# Patient Record
Sex: FEMALE | Race: BLACK OR AFRICAN AMERICAN | Employment: OTHER | URBAN - METROPOLITAN AREA
[De-identification: names, ages, dates, MRNs, and addresses within clinical notes are randomized per-mention and may not be internally consistent; named-entity substitution may affect disease eponyms.]

---

## 2021-11-13 ENCOUNTER — APPOINTMENT (OUTPATIENT)
Dept: CT IMAGING | Age: 86
DRG: 643 | End: 2021-11-13
Attending: EMERGENCY MEDICINE
Payer: MEDICARE

## 2021-11-13 ENCOUNTER — APPOINTMENT (OUTPATIENT)
Dept: ULTRASOUND IMAGING | Age: 86
DRG: 643 | End: 2021-11-13
Attending: HOSPITALIST
Payer: MEDICARE

## 2021-11-13 ENCOUNTER — HOSPITAL ENCOUNTER (INPATIENT)
Age: 86
LOS: 11 days | Discharge: ANOTHER HOSPITAL | DRG: 643 | End: 2021-11-24
Attending: EMERGENCY MEDICINE | Admitting: HOSPITALIST
Payer: MEDICARE

## 2021-11-13 ENCOUNTER — APPOINTMENT (OUTPATIENT)
Dept: GENERAL RADIOLOGY | Age: 86
DRG: 643 | End: 2021-11-13
Attending: EMERGENCY MEDICINE
Payer: MEDICARE

## 2021-11-13 DIAGNOSIS — R91.8 LUNG MASS: ICD-10-CM

## 2021-11-13 DIAGNOSIS — E83.52 HYPERCALCEMIA: Primary | ICD-10-CM

## 2021-11-13 LAB
25(OH)D3 SERPL-MCNC: 32.7 NG/ML (ref 30–100)
ALBUMIN SERPL-MCNC: 3.6 G/DL (ref 3.5–5)
ALBUMIN/GLOB SERPL: 0.8 {RATIO} (ref 1.1–2.2)
ALP SERPL-CCNC: 58 U/L (ref 45–117)
ALT SERPL-CCNC: 20 U/L (ref 12–78)
ANION GAP SERPL CALC-SCNC: 7 MMOL/L (ref 5–15)
AST SERPL-CCNC: 31 U/L (ref 15–37)
BASOPHILS # BLD: 0 K/UL (ref 0–0.1)
BASOPHILS NFR BLD: 1 % (ref 0–1)
BILIRUB SERPL-MCNC: 0.6 MG/DL (ref 0.2–1)
BUN SERPL-MCNC: 66 MG/DL (ref 6–20)
BUN/CREAT SERPL: 29 (ref 12–20)
CALCIUM SERPL-MCNC: 16.6 MG/DL (ref 8.5–10.1)
CALCIUM SERPL-MCNC: 16.6 MG/DL (ref 8.5–10.1)
CHLORIDE SERPL-SCNC: 96 MMOL/L (ref 97–108)
CO2 SERPL-SCNC: 27 MMOL/L (ref 21–32)
COMMENT, HOLDF: NORMAL
CREAT SERPL-MCNC: 2.27 MG/DL (ref 0.55–1.02)
DIFFERENTIAL METHOD BLD: ABNORMAL
EOSINOPHIL # BLD: 0 K/UL (ref 0–0.4)
EOSINOPHIL NFR BLD: 1 % (ref 0–7)
ERYTHROCYTE [DISTWIDTH] IN BLOOD BY AUTOMATED COUNT: 11.8 % (ref 11.5–14.5)
GLOBULIN SER CALC-MCNC: 4.6 G/DL (ref 2–4)
GLUCOSE SERPL-MCNC: 119 MG/DL (ref 65–100)
HCT VFR BLD AUTO: 38.4 % (ref 35–47)
HGB BLD-MCNC: 12.4 G/DL (ref 11.5–16)
IMM GRANULOCYTES # BLD AUTO: 0 K/UL (ref 0–0.04)
IMM GRANULOCYTES NFR BLD AUTO: 0 % (ref 0–0.5)
LYMPHOCYTES # BLD: 1.3 K/UL (ref 0.8–3.5)
LYMPHOCYTES NFR BLD: 15 % (ref 12–49)
MCH RBC QN AUTO: 28.7 PG (ref 26–34)
MCHC RBC AUTO-ENTMCNC: 32.3 G/DL (ref 30–36.5)
MCV RBC AUTO: 88.9 FL (ref 80–99)
MONOCYTES # BLD: 1.1 K/UL (ref 0–1)
MONOCYTES NFR BLD: 13 % (ref 5–13)
NEUTS SEG # BLD: 5.9 K/UL (ref 1.8–8)
NEUTS SEG NFR BLD: 70 % (ref 32–75)
NRBC # BLD: 0 K/UL (ref 0–0.01)
NRBC BLD-RTO: 0 PER 100 WBC
PLATELET # BLD AUTO: 211 K/UL (ref 150–400)
PMV BLD AUTO: 11.8 FL (ref 8.9–12.9)
POTASSIUM SERPL-SCNC: 4 MMOL/L (ref 3.5–5.1)
PROT SERPL-MCNC: 8.2 G/DL (ref 6.4–8.2)
PTH-INTACT SERPL-MCNC: 1720.9 PG/ML (ref 18.4–88)
RBC # BLD AUTO: 4.32 M/UL (ref 3.8–5.2)
SAMPLES BEING HELD,HOLD: NORMAL
SODIUM SERPL-SCNC: 130 MMOL/L (ref 136–145)
WBC # BLD AUTO: 8.3 K/UL (ref 3.6–11)

## 2021-11-13 PROCEDURE — 80053 COMPREHEN METABOLIC PANEL: CPT

## 2021-11-13 PROCEDURE — 85025 COMPLETE CBC W/AUTO DIFF WBC: CPT

## 2021-11-13 PROCEDURE — 74011250636 HC RX REV CODE- 250/636: Performed by: HOSPITALIST

## 2021-11-13 PROCEDURE — 82784 ASSAY IGA/IGD/IGG/IGM EACH: CPT

## 2021-11-13 PROCEDURE — 76775 US EXAM ABDO BACK WALL LIM: CPT

## 2021-11-13 PROCEDURE — 83970 ASSAY OF PARATHORMONE: CPT

## 2021-11-13 PROCEDURE — 71250 CT THORAX DX C-: CPT

## 2021-11-13 PROCEDURE — 74011250636 HC RX REV CODE- 250/636: Performed by: EMERGENCY MEDICINE

## 2021-11-13 PROCEDURE — 36415 COLL VENOUS BLD VENIPUNCTURE: CPT

## 2021-11-13 PROCEDURE — 84165 PROTEIN E-PHORESIS SERUM: CPT

## 2021-11-13 PROCEDURE — 82306 VITAMIN D 25 HYDROXY: CPT

## 2021-11-13 PROCEDURE — 65660000001 HC RM ICU INTERMED STEPDOWN

## 2021-11-13 PROCEDURE — 99285 EMERGENCY DEPT VISIT HI MDM: CPT

## 2021-11-13 PROCEDURE — 76770 US EXAM ABDO BACK WALL COMP: CPT

## 2021-11-13 PROCEDURE — 71045 X-RAY EXAM CHEST 1 VIEW: CPT

## 2021-11-13 PROCEDURE — 82397 CHEMILUMINESCENT ASSAY: CPT

## 2021-11-13 PROCEDURE — 93005 ELECTROCARDIOGRAM TRACING: CPT

## 2021-11-13 RX ORDER — CHLORTHALIDONE 25 MG/1
25 TABLET ORAL DAILY
COMMUNITY

## 2021-11-13 RX ORDER — CINACALCET 30 MG/1
30 TABLET, FILM COATED ORAL
Status: DISCONTINUED | OUTPATIENT
Start: 2021-11-13 | End: 2021-11-15

## 2021-11-13 RX ORDER — SODIUM CHLORIDE 9 MG/ML
125 INJECTION, SOLUTION INTRAVENOUS CONTINUOUS
Status: DISCONTINUED | OUTPATIENT
Start: 2021-11-13 | End: 2021-11-20

## 2021-11-13 RX ORDER — AMLODIPINE BESYLATE 5 MG/1
5 TABLET ORAL DAILY
COMMUNITY

## 2021-11-13 RX ORDER — ROSUVASTATIN CALCIUM 10 MG/1
10 TABLET, COATED ORAL
COMMUNITY

## 2021-11-13 RX ADMIN — SODIUM CHLORIDE 1000 ML: 9 INJECTION, SOLUTION INTRAVENOUS at 13:49

## 2021-11-13 RX ADMIN — SODIUM CHLORIDE 125 ML/HR: 900 INJECTION, SOLUTION INTRAVENOUS at 16:00

## 2021-11-13 NOTE — ED TRIAGE NOTES
Pt states she was sent to ED by her doctor for having high calcium. States she was feeling weak yesterday.  Is unsure why labs were drawn

## 2021-11-13 NOTE — ED PROVIDER NOTES
This is an 28-year-old female who presents with fatigue, weakness, constipation and some transient confusion. She was seen yesterday by her primary physician is Dr. Sana Ardon from [facility]. She had some blood drawn for routine and Dr. Sana Ardon called the daughter today and told her that the calcium was extremely high and she needed to come to the hospital.  She has had no problems with chest pain, shortness of breath, nausea or vomiting and no other GI or  symptoms noted. Patient is alert and oriented currently with no acute complaints. No history of cancer. Vital signs are as noted. No past medical history on file. No past surgical history on file. No family history on file. Social History     Socioeconomic History    Marital status: Not on file     Spouse name: Not on file    Number of children: Not on file    Years of education: Not on file    Highest education level: Not on file   Occupational History    Not on file   Tobacco Use    Smoking status: Not on file    Smokeless tobacco: Not on file   Substance and Sexual Activity    Alcohol use: Not on file    Drug use: Not on file    Sexual activity: Not on file   Other Topics Concern    Not on file   Social History Narrative    Not on file     Social Determinants of Health     Financial Resource Strain:     Difficulty of Paying Living Expenses: Not on file   Food Insecurity:     Worried About Running Out of Food in the Last Year: Not on file    [place] of Food in the Last Year: Not on file   Transportation Needs:     Lack of Transportation (Medical): Not on file    Lack of Transportation (Non-Medical):  Not on file   Physical Activity:     Days of Exercise per Week: Not on file    Minutes of Exercise per Session: Not on file   Stress:     Feeling of Stress : Not on file   Social Connections:     Frequency of Communication with Friends and Family: Not on file    Frequency of Social Gatherings with Friends and Family: Not on file    Attends Jain Services: Not on file    Active Member of Clubs or Organizations: Not on file    Attends Club or Organization Meetings: Not on file    Marital Status: Not on file   Intimate Partner Violence:     Fear of Current or Ex-Partner: Not on file    Emotionally Abused: Not on file    Physically Abused: Not on file    Sexually Abused: Not on file   Housing Stability:     Unable to Pay for Housing in the Last Year: Not on file    Number of [place] in the Last Year: Not on file    Unstable Housing in the Last Year: Not on file         ALLERGIES: Patient has no allergy information on record. Review of Systems   Constitutional: Negative for activity change, appetite change, chills, fatigue and fever. HENT: Negative for ear pain, facial swelling, sore throat and trouble swallowing. Eyes: Negative for pain, discharge and visual disturbance. Respiratory: Negative for chest tightness, shortness of breath and wheezing. Cardiovascular: Negative for chest pain and palpitations. Gastrointestinal: Positive for constipation and nausea. Negative for abdominal pain, blood in stool and vomiting. Genitourinary: Negative for difficulty urinating, flank pain and hematuria. Musculoskeletal: Negative for arthralgias, joint swelling, myalgias and neck pain. Skin: Negative for color change and rash. Neurological: Positive for weakness. Negative for dizziness, numbness and headaches. Hematological: Negative for adenopathy. Does not bruise/bleed easily. Psychiatric/Behavioral: Negative for behavioral problems, confusion and sleep disturbance. All other systems reviewed and are negative. Vitals:    11/13/21 1215 11/13/21 1332   BP: 117/84    Pulse: 70    Resp: 18    Temp: 97.6 °F (36.4 °C)    SpO2: 97% 98%            Physical Exam  Vitals and nursing note reviewed. Constitutional:       General: She is not in acute distress.      Appearance: She is well-developed. She is not ill-appearing or diaphoretic. HENT:      Head: Normocephalic and atraumatic. Nose: Nose normal.   Eyes:      General: No scleral icterus. Conjunctiva/sclera: Conjunctivae normal.      Pupils: Pupils are equal, round, and reactive to light. Neck:      Thyroid: No thyromegaly. Vascular: No JVD. Trachea: No tracheal deviation. Comments: No carotid bruits noted. Cardiovascular:      Rate and Rhythm: Normal rate and regular rhythm. Heart sounds: Normal heart sounds. No murmur heard. No friction rub. No gallop. Pulmonary:      Effort: Pulmonary effort is normal. No respiratory distress. Breath sounds: Normal breath sounds. No wheezing or rales. Chest:      Chest wall: No tenderness. Abdominal:      General: Bowel sounds are normal. There is no distension. Palpations: Abdomen is soft. There is no mass. Tenderness: There is no abdominal tenderness. There is no guarding or rebound. Musculoskeletal:         General: No tenderness. Normal range of motion. Cervical back: Normal range of motion and neck supple. Lymphadenopathy:      Cervical: No cervical adenopathy. Skin:     General: Skin is warm and dry. Capillary Refill: Capillary refill takes 2 to 3 seconds. Findings: No erythema or rash. Neurological:      General: No focal deficit present. Mental Status: She is alert and oriented to person, place, and time. Mental status is at baseline. Cranial Nerves: No cranial nerve deficit. Coordination: Coordination normal.      Deep Tendon Reflexes: Reflexes are normal and symmetric. Psychiatric:         Behavior: Behavior normal.         Thought Content:  Thought content normal.         Judgment: Judgment normal.          MDM  Number of Diagnoses or Management Options  Hypercalcemia: new and requires workup  Lung mass: new and requires workup     Amount and/or Complexity of Data Reviewed  Clinical lab tests: ordered and reviewed  Tests in the radiology section of CPT®: ordered and reviewed  Decide to obtain previous medical records or to obtain history from someone other than the patient: yes  Review and summarize past medical records: yes  Discuss the patient with other providers: yes    Risk of Complications, Morbidity, and/or Mortality  Presenting problems: high  Diagnostic procedures: high  Management options: high    Patient Progress  Patient progress: stable         Procedures    This is an 42-year-old female with history of elevated calcium. We are in the process of verifying that lab and giving IV fluids currently. Her EKG appears to be normal along with changes of hypercalcemia. ED MD EKG interpretation, normal sinus rhythm at 67 beats a minute. Left axis shift is noted. No ectopy. There are some ST changes in V2 and V3 that are consistent with hypercalcemia. Patient is having no chest pain with the symptoms. 1:49 PM  Brittany Smart MD      Perfect Serve Consult for Admission  3:15 PM    ED Room Number: ER20/20  Patient Name and age:  Rubens Abdi 81 y.o.  female  Working Diagnosis:   1. Hypercalcemia    2.  Lung mass        COVID-19 Suspicion:  no  Sepsis present:  no  Reassessment needed: no  Code Status:  Full Code  Readmission: no  Isolation Requirements:  no  Recommended Level of Care:  telemetry  Department:[facility] ED - 21   Other:  Patient getting CT chest for lung mass

## 2021-11-13 NOTE — H&P
History & Physical    Primary Care Provider: Sean Moreno MD  Source of Information: Patient     History of Presenting Illness:   Terrance Solis is a 81 y.o. female who presents with fatigue constipation and abnormal lab test     This is an 81-year-old female who presents with fatigue, weakness, constipation and some transient confusion. She was seen yesterday by her primary physician is Dr. Aashish Cruz from [facility]. She had some blood drawn for routine and Dr. Aashish Cruz called the daughter today and told her that the calcium was extremely high and she needed to come to the hospital.  She has had no problems with chest pain, shortness of breath, nausea or vomiting and no other GI or  symptoms noted. Patient is alert and oriented currently with no acute complaints. Per pt, noticed lost weight last several years \" previous pants and clothes all too large for her now\" . Denied dysphagia. Denied voice changes. No history of cancer. .       Review of Systems:  General: HPI. HEENT: no headache, no vision changes, no nose discharge, no hearing changes   RES: no wheezing, no cough, no sob  CVS: no cp, no palpitation. Muscular: no joint swelling, no muscle pain, no leg swelling  Skin: no rash, no itching   GI: no vomiting, no diarrhea, no appetite, low po intake   : no dysuria, no hematuria  Hemo: no gum bleeding, no petechial   Neuro: no sensation changes, no focal weakness   Endo: no polydipsia   Psych: denied depression     No past medical history on file. No past surgical history on file. Prior to Admission medications    Medication Sig Start Date End Date Taking? Authorizing Provider   chlorthalidone (HYGROTON) 25 mg tablet Take 25 mg by mouth daily. Yes Provider, Historical   amLODIPine (Norvasc) 5 mg tablet Take 5 mg by mouth daily. Yes Provider, Historical   rosuvastatin (CRESTOR) 10 mg tablet Take 10 mg by mouth nightly.    Yes Provider, Historical     No Known Allergies   No family history on file. SOCIAL HISTORY:  Patient resides:  Independently x   Assisted Living    SNF    With family care       Smoking history:   None x   Former    Chronic      Alcohol history:   None x   Social    Chronic      Ambulates:   Independently x   w/cane    w/walker    w/wc    CODE STATUS:  DNR    Full x   Other      Objective:     Physical Exam:     Visit Vitals  BP (!) 143/57   Pulse 70   Temp 97.6 °F (36.4 °C)   Resp 19   SpO2 100%           General:  Alert, cooperative, no distress, appears stated age. Head:  Normocephalic, without obvious abnormality, atraumatic. Eyes:  Conjunctivae/corneas clear. PERRL, EOMs intact. Nose: Nares normal. Septum midline. Mucosa normal. No drainage or sinus tenderness. Throat: Lips, mucosa, and tongue normal. Teeth and gums normal.   Neck: Supple, symmetrical, trachea midline, no adenopathy, thyroid: no enlargement/tenderness/nodules, no carotid bruit and no JVD. Back:   Symmetric, no curvature. ROM normal. No CVA tenderness. Lungs:   Clear to auscultation bilaterally. Chest wall:  No tenderness or deformity. Heart:  Regular rate and rhythm, S1, S2 normal, no murmur, click, rub or gallop. Abdomen:   Soft, non-tender. Bowel sounds normal. No masses,  No organomegaly. Extremities: Extremities normal, atraumatic, no cyanosis or edema. Pulses: 2+ and symmetric all extremities. Skin: Skin color, texture, turgor normal. No rashes or lesions   Neurologic: CNII-XII intact. None focal                Data Review:     Recent Days:  Recent Labs     11/13/21  1339   WBC 8.3   HGB 12.4   HCT 38.4        Recent Labs     11/13/21  1340 11/13/21  1339   *  --    K 4.0  --    CL 96*  --    CO2 27  --    *  --    BUN 66*  --    CREA 2.27*  --    CA 16.6* 16.6*   ALB 3.6  --    ALT 20  --      No results for input(s): PH, PCO2, PO2, HCO3, FIO2 in the last 72 hours.     24 Hour Results:  Recent Results (from the past 24 hour(s))   EKG, 12 LEAD, INITIAL    Collection Time: 11/13/21  1:23 PM   Result Value Ref Range    Ventricular Rate 67 BPM    Atrial Rate 67 BPM    P-R Interval 176 ms    QRS Duration 98 ms    Q-T Interval 426 ms    QTC Calculation (Bezet) 450 ms    Calculated P Axis 42 degrees    Calculated R Axis -12 degrees    Calculated T Axis 55 degrees    Diagnosis         Normal sinus rhythm  Moderate voltage criteria for LVH, may be normal variant ( R in aVL , [employer]am[employer]   product )  Anteroseptal infarct , possibly acute    Abnormal ECG  No previous ECGs available     CBC WITH AUTOMATED DIFF    Collection Time: 11/13/21  1:39 PM   Result Value Ref Range    WBC 8.3 3.6 - 11.0 K/uL    RBC 4.32 3.80 - 5.20 M/uL    HGB 12.4 11.5 - 16.0 g/dL    HCT 38.4 35.0 - 47.0 %    MCV 88.9 80.0 - 99.0 FL    MCH 28.7 26.0 - 34.0 PG    MCHC 32.3 30.0 - 36.5 g/dL    RDW 11.8 11.5 - 14.5 %    PLATELET 454 146 - 208 K/uL    MPV 11.8 8.9 - 12.9 FL    NRBC 0.0 0  WBC    ABSOLUTE NRBC 0.00 0.00 - 0.01 K/uL    NEUTROPHILS 70 32 - 75 %    LYMPHOCYTES 15 12 - 49 %    MONOCYTES 13 5 - 13 %    EOSINOPHILS 1 0 - 7 %    BASOPHILS 1 0 - 1 %    IMMATURE GRANULOCYTES 0 0.0 - 0.5 %    ABS. NEUTROPHILS 5.9 1.8 - 8.0 K/UL    ABS. LYMPHOCYTES 1.3 0.8 - 3.5 K/UL    ABS. MONOCYTES 1.1 (H) 0.0 - 1.0 K/UL    ABS. EOSINOPHILS 0.0 0.0 - 0.4 K/UL    ABS. BASOPHILS 0.0 0.0 - 0.1 K/UL    ABS. IMM. GRANS. 0.0 0.00 - 0.04 K/UL    DF AUTOMATED     SAMPLES BEING HELD    Collection Time: 11/13/21  1:39 PM   Result Value Ref Range    SAMPLES BEING HELD 1BLU     COMMENT        Add-on orders for these samples will be processed based on acceptable specimen integrity and analyte stability, which may vary by analyte.    PTH INTACT    Collection Time: 11/13/21  1:39 PM   Result Value Ref Range    Calcium 16.6 (HH) 8.5 - 10.1 MG/DL    PTH, Intact 1,720.9 (H) 18.4 - 74.1 pg/mL   METABOLIC PANEL, COMPREHENSIVE    Collection Time: 11/13/21  1:40 PM   Result Value Ref Range    Sodium 130 (L) 136 - 145 mmol/L    Potassium 4.0 3.5 - 5.1 mmol/L    Chloride 96 (L) 97 - 108 mmol/L    CO2 27 21 - 32 mmol/L    Anion gap 7 5 - 15 mmol/L    Glucose 119 (H) 65 - 100 mg/dL    BUN 66 (H) 6 - 20 MG/DL    Creatinine 2.27 (H) 0.55 - 1.02 MG/DL    BUN/Creatinine ratio 29 (H) 12 - 20      GFR est AA 25 (L) >60 ml/min/1.73m2    GFR est non-AA 20 (L) >60 ml/min/1.73m2    Calcium 16.6 (HH) 8.5 - 10.1 MG/DL    Bilirubin, total 0.6 0.2 - 1.0 MG/DL    ALT (SGPT) 20 12 - 78 U/L    AST (SGOT) 31 15 - 37 U/L    Alk. phosphatase 58 45 - 117 U/L    Protein, total 8.2 6.4 - 8.2 g/dL    Albumin 3.6 3.5 - 5.0 g/dL    Globulin 4.6 (H) 2.0 - 4.0 g/dL    A-G Ratio 0.8 (L) 1.1 - 2.2           Imaging:   XR CHEST PORT    Result Date: 11/13/2021  Right paratracheal mass. Chest CT with IV contrast (venous phase, not PE protocol) is recommended for further evaluation. The findings were called to Dr. Crystal Bae on 11/13/2021 2:42 PM by Dr. Chance Cramer. 409       Assessment:     Active Problems:    Hypercalcemia (11/13/2021)      Lung mass (11/13/2021)           Plan:     1. Severe hypercalcemia: etiology unclear, pt is taking thiazide diuretics at home. aggressive IVF now.  will check I PTH, PTHrp, VIt d level. Tsh, and spep/if to r/o MM. Nephrologist consult. Plan discussed. 2. VÍCTOR likely: pt denied CKD history. May due to preneal. IVF, check renal us, nephrologist consult. 3. Right paratracheal mass: could be the malignancy that cause her hypercalcemia. Non contrast CT of chest to further eval mass, may need either IR or pulmonologist or ENT consult for further  Plan   4.  HTN: hold thiazide , restart norvasc        Signed By: Clarissa Mathis MD     November 13, 2021

## 2021-11-14 LAB
ANION GAP SERPL CALC-SCNC: 4 MMOL/L (ref 5–15)
ATRIAL RATE: 67 BPM
BUN SERPL-MCNC: 56 MG/DL (ref 6–20)
BUN/CREAT SERPL: 30 (ref 12–20)
CALCIUM SERPL-MCNC: 14.8 MG/DL (ref 8.5–10.1)
CALCULATED P AXIS, ECG09: 42 DEGREES
CALCULATED R AXIS, ECG10: -12 DEGREES
CALCULATED T AXIS, ECG11: 55 DEGREES
CHLORIDE SERPL-SCNC: 105 MMOL/L (ref 97–108)
CO2 SERPL-SCNC: 25 MMOL/L (ref 21–32)
CREAT SERPL-MCNC: 1.88 MG/DL (ref 0.55–1.02)
DIAGNOSIS, 93000: NORMAL
ERYTHROCYTE [DISTWIDTH] IN BLOOD BY AUTOMATED COUNT: 11.8 % (ref 11.5–14.5)
GLUCOSE SERPL-MCNC: 88 MG/DL (ref 65–100)
HCT VFR BLD AUTO: 31.5 % (ref 35–47)
HGB BLD-MCNC: 10.1 G/DL (ref 11.5–16)
MAGNESIUM SERPL-MCNC: 1.7 MG/DL (ref 1.6–2.4)
MCH RBC QN AUTO: 28.5 PG (ref 26–34)
MCHC RBC AUTO-ENTMCNC: 32.1 G/DL (ref 30–36.5)
MCV RBC AUTO: 89 FL (ref 80–99)
NRBC # BLD: 0 K/UL (ref 0–0.01)
NRBC BLD-RTO: 0 PER 100 WBC
P-R INTERVAL, ECG05: 176 MS
PHOSPHATE SERPL-MCNC: 2.8 MG/DL (ref 2.6–4.7)
PLATELET # BLD AUTO: 167 K/UL (ref 150–400)
PMV BLD AUTO: 11.8 FL (ref 8.9–12.9)
POTASSIUM SERPL-SCNC: 3.3 MMOL/L (ref 3.5–5.1)
Q-T INTERVAL, ECG07: 426 MS
QRS DURATION, ECG06: 98 MS
QTC CALCULATION (BEZET), ECG08: 450 MS
RBC # BLD AUTO: 3.54 M/UL (ref 3.8–5.2)
SODIUM SERPL-SCNC: 134 MMOL/L (ref 136–145)
TSH SERPL DL<=0.05 MIU/L-ACNC: 0.33 UIU/ML (ref 0.36–3.74)
VENTRICULAR RATE, ECG03: 67 BPM
WBC # BLD AUTO: 7.5 K/UL (ref 3.6–11)

## 2021-11-14 PROCEDURE — 77030038269 HC DRN EXT URIN PURWCK BARD -A

## 2021-11-14 PROCEDURE — 36415 COLL VENOUS BLD VENIPUNCTURE: CPT

## 2021-11-14 PROCEDURE — 83735 ASSAY OF MAGNESIUM: CPT

## 2021-11-14 PROCEDURE — 74011250637 HC RX REV CODE- 250/637: Performed by: HOSPITALIST

## 2021-11-14 PROCEDURE — 74011250636 HC RX REV CODE- 250/636: Performed by: HOSPITALIST

## 2021-11-14 PROCEDURE — 85027 COMPLETE CBC AUTOMATED: CPT

## 2021-11-14 PROCEDURE — 65660000001 HC RM ICU INTERMED STEPDOWN

## 2021-11-14 PROCEDURE — 84443 ASSAY THYROID STIM HORMONE: CPT

## 2021-11-14 PROCEDURE — 80048 BASIC METABOLIC PNL TOTAL CA: CPT

## 2021-11-14 PROCEDURE — 74011250637 HC RX REV CODE- 250/637: Performed by: INTERNAL MEDICINE

## 2021-11-14 PROCEDURE — 84100 ASSAY OF PHOSPHORUS: CPT

## 2021-11-14 RX ORDER — AMLODIPINE BESYLATE 5 MG/1
5 TABLET ORAL DAILY
Status: DISCONTINUED | OUTPATIENT
Start: 2021-11-14 | End: 2021-11-17

## 2021-11-14 RX ORDER — HEPARIN SODIUM 5000 [USP'U]/ML
5000 INJECTION, SOLUTION INTRAVENOUS; SUBCUTANEOUS EVERY 12 HOURS
Status: DISCONTINUED | OUTPATIENT
Start: 2021-11-14 | End: 2021-11-25 | Stop reason: HOSPADM

## 2021-11-14 RX ORDER — ONDANSETRON 2 MG/ML
4 INJECTION INTRAMUSCULAR; INTRAVENOUS
Status: DISCONTINUED | OUTPATIENT
Start: 2021-11-14 | End: 2021-11-25 | Stop reason: HOSPADM

## 2021-11-14 RX ORDER — POTASSIUM CHLORIDE 750 MG/1
20 TABLET, FILM COATED, EXTENDED RELEASE ORAL 2 TIMES DAILY
Status: COMPLETED | OUTPATIENT
Start: 2021-11-14 | End: 2021-11-14

## 2021-11-14 RX ORDER — ROSUVASTATIN CALCIUM 10 MG/1
10 TABLET, COATED ORAL
Status: DISCONTINUED | OUTPATIENT
Start: 2021-11-14 | End: 2021-11-25 | Stop reason: HOSPADM

## 2021-11-14 RX ORDER — SODIUM CHLORIDE 0.9 % (FLUSH) 0.9 %
5-40 SYRINGE (ML) INJECTION AS NEEDED
Status: DISCONTINUED | OUTPATIENT
Start: 2021-11-14 | End: 2021-11-25 | Stop reason: HOSPADM

## 2021-11-14 RX ORDER — ACETAMINOPHEN 325 MG/1
650 TABLET ORAL
Status: DISCONTINUED | OUTPATIENT
Start: 2021-11-14 | End: 2021-11-25 | Stop reason: HOSPADM

## 2021-11-14 RX ORDER — SODIUM CHLORIDE 0.9 % (FLUSH) 0.9 %
5-40 SYRINGE (ML) INJECTION EVERY 8 HOURS
Status: DISCONTINUED | OUTPATIENT
Start: 2021-11-14 | End: 2021-11-25 | Stop reason: HOSPADM

## 2021-11-14 RX ORDER — ACETAMINOPHEN 650 MG/1
650 SUPPOSITORY RECTAL
Status: DISCONTINUED | OUTPATIENT
Start: 2021-11-14 | End: 2021-11-25 | Stop reason: HOSPADM

## 2021-11-14 RX ORDER — ONDANSETRON 4 MG/1
4 TABLET, ORALLY DISINTEGRATING ORAL
Status: DISCONTINUED | OUTPATIENT
Start: 2021-11-14 | End: 2021-11-25 | Stop reason: HOSPADM

## 2021-11-14 RX ORDER — POLYETHYLENE GLYCOL 3350 17 G/17G
17 POWDER, FOR SOLUTION ORAL DAILY PRN
Status: DISCONTINUED | OUTPATIENT
Start: 2021-11-14 | End: 2021-11-25 | Stop reason: HOSPADM

## 2021-11-14 RX ADMIN — SODIUM CHLORIDE 125 ML/HR: 900 INJECTION, SOLUTION INTRAVENOUS at 00:15

## 2021-11-14 RX ADMIN — SODIUM CHLORIDE 125 ML/HR: 900 INJECTION, SOLUTION INTRAVENOUS at 19:26

## 2021-11-14 RX ADMIN — CINACALCET HYDROCHLORIDE 30 MG: 30 TABLET, FILM COATED ORAL at 10:09

## 2021-11-14 RX ADMIN — ROSUVASTATIN 10 MG: 10 TABLET, FILM COATED ORAL at 22:57

## 2021-11-14 RX ADMIN — Medication 10 ML: at 22:57

## 2021-11-14 RX ADMIN — POTASSIUM CHLORIDE 20 MEQ: 750 TABLET, FILM COATED, EXTENDED RELEASE ORAL at 19:20

## 2021-11-14 RX ADMIN — POTASSIUM CHLORIDE 20 MEQ: 750 TABLET, FILM COATED, EXTENDED RELEASE ORAL at 10:10

## 2021-11-14 RX ADMIN — HEPARIN SODIUM 5000 UNITS: 5000 INJECTION INTRAVENOUS; SUBCUTANEOUS at 16:08

## 2021-11-14 RX ADMIN — AMLODIPINE BESYLATE 5 MG: 5 TABLET ORAL at 10:10

## 2021-11-14 RX ADMIN — Medication 10 ML: at 16:13

## 2021-11-14 NOTE — CONSULTS
OTOLARYNGOLOGY - HEAD AND NECK SURGERY HISTORY AND PHYSICAL    CC:   hypercalcemia    HPI:     Kristina Moreno is a 81 y.o. female seen today as a new inpatient consult for hypercalcemia. This is an 81-year-old female who presents with fatigue, weakness, constipation and some transient confusion. She was seen Friday by her primary physician is Dr. Madhavi Vann from [facility]. She had some blood drawn for routine and Dr. Madhavi Vann called the daughter Saturday and told her that the calcium was extremely high and she needed to come to the hospital.  She has had no problems with chest pain, shortness of breath, nausea or vomiting and no other GI or  symptoms noted. Patient is alert and oriented currently with no acute complaints. No history of cancer. Vital signs are as noted. PMH: HTN, hyperlipidemia  No past surgical history on file. Current Facility-Administered Medications   Medication Dose Route Frequency    amLODIPine (NORVASC) tablet 5 mg  5 mg Oral DAILY    potassium chloride SR (KLOR-CON 10) tablet 20 mEq  20 mEq Oral BID    0.9% sodium chloride infusion  125 mL/hr IntraVENous CONTINUOUS    cinacalcet (SENSIPAR) tablet 30 mg  30 mg Oral DAILY WITH BREAKFAST      No Known Allergies  No family history on file. Social History     Tobacco Use    Smoking status: Not on file    Smokeless tobacco: Not on file   Substance Use Topics    Alcohol use: Not on file    Drug use: Not on file         REVIEW OF SYSTEMS  A full 10 point review of systems was performed today. The review was non-pertinent other than the details already listed in the history of present illness. Visit Vitals  BP (!) 154/46 (BP 1 Location: Left upper arm, BP Patient Position: At rest;Semi fowlers)   Pulse 66   Temp 98.6 °F (37 °C)   Resp 20   Ht 5' 6\" (1.676 m)   Wt 53.3 kg (117 lb 8 oz)   SpO2 99%   BMI 18.96 kg/m²        PHYSICAL EXAM  General:  No acute distress. Alert and oriented x 3.    MSK:   Normal muscle bulk  Psych: Mood and affect appropriate. Neuro:  CN II - XII grossly intact bilaterally. Eyes:  PERRL/EOMI, no nystagmus. ENT:   EACs are patent, clean and dry. TMs clear and intact with normal anatomic landmarks. No middle ear fluid. Anterior rhinoscopy without mucopurulence or polyps. OC/OP clear without masses or lesions. Lymph:  Neck soft and supple without lymphadenopathy. Right neck fullness. Resp:   No audible stridor or wheezing. Skin:   Head and neck skin is without suspicious lesions. DATA REVIEW:    Lab Results   Component Value Date/Time    TSH 0.33 (L) 11/14/2021 04:14 AM      Lab Results   Component Value Date/Time    Calcium 14.8 (HH) 11/14/2021 04:14 AM    Phosphorus 2.8 11/14/2021 04:14 AM    PTH, Intact 1,720.9 (H) 11/13/2021 01:39 PM      Lab Results   Component Value Date/Time    Vitamin D 25-Hydroxy 32.7 11/13/2021 06:29 PM        PTH-rp- pending    Lab Results   Component Value Date/Time    Sodium 134 (L) 11/14/2021 04:14 AM    Potassium 3.3 (L) 11/14/2021 04:14 AM    Chloride 105 11/14/2021 04:14 AM    CO2 25 11/14/2021 04:14 AM    Anion gap 4 (L) 11/14/2021 04:14 AM    Glucose 88 11/14/2021 04:14 AM    BUN 56 (H) 11/14/2021 04:14 AM    Creatinine 1.88 (H) 11/14/2021 04:14 AM    BUN/Creatinine ratio 30 (H) 11/14/2021 04:14 AM    GFR est AA 31 (L) 11/14/2021 04:14 AM    GFR est non-AA 25 (L) 11/14/2021 04:14 AM    Calcium 14.8 (HH) 11/14/2021 04:14 AM      CT chest w/o contrast dated 11/13/21:  I personally reviewed the scan which has the following pertinent findings:  Large right sided goiter causing some mild compression of trachea. No airway erosion. ASSESSMENT/PLAN:  81 y.o. F presenting with hypercalcemia, severe hyperparathyroidism, and.  Nephrology following and started Sensipar together with aggressive IVF resuscitation. Parathyroid adenoma, 4 gland hyperplasia or even parathyroid carcinoma a possibility given extremely elevated PTH. PTH-rp pending.       Please get Sestamibi scan while in house as well as CT neck w/ contrast after creatinine has normalized. Also low TSH. I do not see a T4/T3. I recommend checking T3/T4 and consulting endocrinology tomorrow. Will continue to follow. Artist Gerda Dyer, [address] [facility] [address], [facility]   [address] [facility], [address]   (B) [phone]  (P) [phone]

## 2021-11-14 NOTE — PROGRESS NOTES
[address] [facility] Adult  Hospitalist Group                                                                                          Hospitalist Progress Note  Gustavo Devries MD  Answering service: [phone] or [phone] from in house phone        Date of Service:  2021  NAME:  Sloan Vilchis  :  1935  MRN:  [id number]      Admission Summary:   Sloan Vilchis is a 81 y.o. female who presents with fatigue constipation and abnormal lab test      This is an 44-year-old female who presents with fatigue, weakness, constipation and some transient confusion.  She was seen yesterday by her primary physician is Dr. Flaca Cavazos from [facility] had some blood drawn for routine and Dr. Flaca Cavazos called the daughter today and told her that the calcium was extremely high and she needed to come to the hospital. Crystal Pérez has had no problems with chest pain, shortness of breath, nausea or vomiting and no other GI or  symptoms noted.  Patient is alert and oriented currently with no acute complaints. Per pt, noticed lost weight last several years \" previous pants and clothes all too large for her now\" . Denied dysphagia.  Denied voice changes.  No history of cancer.  .       Interval history / Subjective:     F/u hypercalcemia   Slightly improved weakness  Assessment & Plan:     Severe hypercalcemia: etiology unclear  VÍCTOR  Primary hyperparathyroidism  - pt is taking thiazide diuretics at home, currently on hold  -Calcium improved from 16.6 to 14.8  -Creatinine improved from 2.27 to 1.88  -Increased intact PTH, follow PTHrP  -renal US unremarkable  -Continue IV Fluids  -s/p sensipar  -Appreciate Nephrology    Right paratracheal mass  -Appreciate ENT, sestamibi scan, CT neck with contrast once renal function improves    Hypokalemia: replace as needed  Hyponatremia: improving with fluids  Abnormal admission EKG, will repeat. echo  HTN: hold thiazide , restart norvasc       Renal diet    Code status: FULL CODE  DVT prophylaxis: Heparin    Plan: On sensipar, continue IV fluids    Care Plan discussed with: Patient/Family  Anticipated Disposition: TBD  Anticipated Discharge: Greater than 48 hours     Hospital Problems  Date Reviewed: 11/14/2021          Codes Class Noted POA    * (Principal) Hypercalcemia ICD-10-CM: K80.00  ICD-9-CM: 275.42  11/13/2021 Yes        Lung mass ICD-10-CM: R91.8  ICD-9-CM: 786.6  11/13/2021 Unknown                Review of Systems:   A comprehensive review of systems was negative except for that written in the HPI. Vital Signs:    Last 24hrs VS reviewed since prior progress note. Most recent are:  Visit Vitals  /89 (BP 1 Location: Left upper arm, BP Patient Position: At rest)   Pulse 70   Temp 98.5 °F (36.9 °C)   Resp 14   Ht 5' 6\" (1.676 m)   Wt 53.3 kg (117 lb 8 oz)   SpO2 100%   BMI 18.96 kg/m²         Intake/Output Summary (Last 24 hours) at 11/14/2021 1312  Last data filed at 11/13/2021 1601  Gross per 24 hour   Intake 1000 ml   Output    Net 1000 ml        Physical Examination:     I had a face to face encounter with this patient and independently examined them on 11/14/2021 as outlined below:          Constitutional:  No acute distress   ENT:  Oral mucosa moist, oropharynx benign. Resp:  CTA bilaterally. No wheezing/rhonchi/rales. No accessory muscle use   CV:  Regular rhythm, normal rate, no murmurs, gallops, rubs    GI:  Soft, non distended, non tender. normoactive bowel sounds, no hepatosplenomegaly     Musculoskeletal:  No edema, warm, 2+ pulses throughout    Neurologic:  Moves all extremities.   AAOx3, CN II-XII reviewed            Data Review:    Review and/or order of clinical lab test      Labs:     Recent Labs     11/14/21  0414 11/13/21  1339   WBC 7.5 8.3   HGB 10.1* 12.4   HCT 31.5* 38.4    211     Recent Labs     11/14/21  0414 11/13/21  1340 11/13/21  1339   * 130*  --    K 3.3* 4.0  --     96*  --    CO2 25 27  --    BUN 56* 66*  --    CREA 1.88* 2.27*  --    GLU 88 119*  --    CA 14.8* 16.6* 16.6*   MG 1.7  --   --    PHOS 2.8  --   --      Recent Labs     11/13/21  1340   ALT 20   AP 58   TBILI 0.6   TP 8.2   ALB 3.6   GLOB 4.6*     No results for input(s): INR, PTP, APTT, INREXT in the last 72 hours. No results for input(s): FE, TIBC, PSAT, FERR in the last 72 hours. No results found for: FOL, RBCF   No results for input(s): PH, PCO2, PO2 in the last 72 hours. No results for input(s): CPK, CKNDX, TROIQ in the last 72 hours.     No lab exists for component: CPKMB  No results found for: CHOL, CHOLX, CHLST, CHOLV, HDL, HDLP, LDL, LDLC, DLDLP, TGLX, TRIGL, TRIGP, CHHD, CHHDX  No results found for: GLUCPOC  No results found for: COLOR, APPRN, SPGRU, REFSG, KATY, PROTU, GLUCU, KETU, BILU, UROU, IRASEMA, LEUKU, GLUKE, EPSU, BACTU, WBCU, RBCU, CASTS, UCRY      Medications Reviewed:     Current Facility-Administered Medications   Medication Dose Route Frequency    amLODIPine (NORVASC) tablet 5 mg  5 mg Oral DAILY    potassium chloride SR (KLOR-CON 10) tablet 20 mEq  20 mEq Oral BID    0.9% sodium chloride infusion  125 mL/hr IntraVENous CONTINUOUS    cinacalcet (SENSIPAR) tablet 30 mg  30 mg Oral DAILY WITH BREAKFAST     ______________________________________________________________________  EXPECTED LENGTH OF STAY: - - -  ACTUAL LENGTH OF STAY:          1                 See Treviño MD

## 2021-11-14 NOTE — PROGRESS NOTES
NAME: River Gonzalez        :  1935        MRN:  [id number]                     Assessment   :                                               Plan:  VÍCTOR  Hyponatremia, mild  Severe hypercalcemia  Constipation  Anorexia  Right paratracheal mass/goiter  Primary hyperparathyroidism  HTN  Hypokalemia Creatinine 2.3 to 1.9; nml SHELLEY  Very high PTH (1720)  PTHrp, SPEP, BERNARDA, 25 VD pending  Na 130 to 134  Calcium 16.6 to 14.8  K 3.3 (give PO)  Restart home amlodipine     Continue IVF resuscitation, Giving Sensipar; I don't think she is a surgical candidate due to age         Subjective:     Chief Complaint:  Awake. Alert. Eating breakfast at the time of my visit. I spoke with she and her daughter about the above findings and plan. Review of Systems:    Symptom Y/N Comments  Symptom Y/N Comments   Fever/Chills    Chest Pain     Poor Appetite    Edema     Cough    Abdominal Pain     Sputum    Joint Pain     SOB/ARIZA    Pruritis/Rash     Nausea/vomit    Tolerating PT/OT     Diarrhea    Tolerating Diet     Constipation    Other       Could not obtain due to:      Objective:     VITALS:   Last 24hrs VS reviewed since prior progress note.  Most recent are:  Visit Vitals  BP (!) 159/57 (BP 1 Location: Right upper arm, BP Patient Position: At rest)   Pulse (!) 58   Temp 98.5 °F (36.9 °C)   Resp 18   Ht 5' 6\" (1.676 m)   Wt 53.3 kg (117 lb 8 oz)   SpO2 100%   BMI 18.96 kg/m²       Intake/Output Summary (Last 24 hours) at 2021 0802  Last data filed at 2021 1601  Gross per 24 hour   Intake 1000 ml   Output    Net 1000 ml      Telemetry Reviewed:     PHYSICAL EXAM:  General: NAD  No edema  cta arpan    Lab Data Reviewed: (see below)    Medications Reviewed: (see below)    PMH/SH reviewed - no change compared to H&P  ________________________________________________________________________  Care Plan discussed with:  Patient     Family      RN Care Manager                    Consultant:          Comments   >50% of visit spent in counseling and coordination of care       ________________________________________________________________________  Luella Angelucci, MD     Procedures: see electronic medical records for all procedures/Xrays and details which  were not copied into this note but were reviewed prior to creation of Plan. LABS:  Recent Labs     11/14/21  0414 11/13/21  1339   WBC 7.5 8.3   HGB 10.1* 12.4   HCT 31.5* 38.4    211     Recent Labs     11/14/21  0414 11/13/21  1340 11/13/21  1339   * 130*  --    K 3.3* 4.0  --     96*  --    CO2 25 27  --    BUN 56* 66*  --    CREA 1.88* 2.27*  --    GLU 88 119*  --    CA 14.8* 16.6* 16.6*   MG 1.7  --   --    PHOS 2.8  --   --      Recent Labs     11/13/21  1340   AP 58   TP 8.2   ALB 3.6   GLOB 4.6*     No results for input(s): INR, PTP, APTT, INREXT in the last 72 hours. No results for input(s): FE, TIBC, PSAT, FERR in the last 72 hours. No results found for: FOL, RBCF   No results for input(s): PH, PCO2, PO2 in the last 72 hours. No results for input(s): CPK, CKMB in the last 72 hours.     No lab exists for component: TROPONINI  No components found for: GLPOC  No results found for: COLOR, APPRN, SPGRU, REFSG, KATY, PROTU, GLUCU, KETU, BILU, UROU, IRASEMA, LEUKU, GLUKE, EPSU, BACTU, WBCU, RBCU, CASTS, UCRY    MEDICATIONS:  Current Facility-Administered Medications   Medication Dose Route Frequency    0.9% sodium chloride infusion  125 mL/hr IntraVENous CONTINUOUS    cinacalcet (SENSIPAR) tablet 30 mg  30 mg Oral DAILY WITH BREAKFAST

## 2021-11-14 NOTE — ED NOTES
TRANSFER - OUT REPORT:    Verbal report given to Kathleen Thompson RN(name) on Marii Leal  being transferred to  (unit) for routine progression of care       Report consisted of patients Situation, Background, Assessment and   Recommendations(SBAR). Information from the following report(s) SBAR was reviewed with the receiving nurse. Lines:   Peripheral IV 11/13/21 Right Forearm (Active)   Site Assessment Clean, dry, & intact 11/13/21 1338   Phlebitis Assessment 0 11/13/21 1338   Infiltration Assessment 0 11/13/21 1338   Dressing Status Clean, dry, & intact 11/13/21 1338   Dressing Type Transparent 11/13/21 1338   Hub Color/Line Status Pink; Flushed 11/13/21 1338        Opportunity for questions and clarification was provided.       Patient transported with:   Monitor  RN

## 2021-11-14 NOTE — PROGRESS NOTES
Bedside and Verbal shift change report given to Diamond (oncoming nurse) by Екатерина Mclaughlin (offgoing nurse). Report included the following information SBAR, Kardex, MAR, Accordion, Recent Results and Cardiac Rhythm sinus.

## 2021-11-15 ENCOUNTER — APPOINTMENT (OUTPATIENT)
Dept: NON INVASIVE DIAGNOSTICS | Age: 86
DRG: 643 | End: 2021-11-15
Attending: HOSPITALIST
Payer: MEDICARE

## 2021-11-15 LAB
ANION GAP SERPL CALC-SCNC: 5 MMOL/L (ref 5–15)
BUN SERPL-MCNC: 52 MG/DL (ref 6–20)
BUN/CREAT SERPL: 27 (ref 12–20)
CALCIUM SERPL-MCNC: 14.7 MG/DL (ref 8.5–10.1)
CHLORIDE SERPL-SCNC: 105 MMOL/L (ref 97–108)
CO2 SERPL-SCNC: 25 MMOL/L (ref 21–32)
COMMENT, HOLDF: NORMAL
CREAT SERPL-MCNC: 1.95 MG/DL (ref 0.55–1.02)
GLUCOSE SERPL-MCNC: 81 MG/DL (ref 65–100)
MAGNESIUM SERPL-MCNC: 1.4 MG/DL (ref 1.6–2.4)
PHOSPHATE SERPL-MCNC: 2.5 MG/DL (ref 2.6–4.7)
POTASSIUM SERPL-SCNC: 3.6 MMOL/L (ref 3.5–5.1)
SAMPLES BEING HELD,HOLD: NORMAL
SODIUM SERPL-SCNC: 135 MMOL/L (ref 136–145)

## 2021-11-15 PROCEDURE — 74011250637 HC RX REV CODE- 250/637: Performed by: HOSPITALIST

## 2021-11-15 PROCEDURE — 97116 GAIT TRAINING THERAPY: CPT

## 2021-11-15 PROCEDURE — 74011250637 HC RX REV CODE- 250/637: Performed by: INTERNAL MEDICINE

## 2021-11-15 PROCEDURE — 97535 SELF CARE MNGMENT TRAINING: CPT

## 2021-11-15 PROCEDURE — 83735 ASSAY OF MAGNESIUM: CPT

## 2021-11-15 PROCEDURE — 97165 OT EVAL LOW COMPLEX 30 MIN: CPT

## 2021-11-15 PROCEDURE — 80048 BASIC METABOLIC PNL TOTAL CA: CPT

## 2021-11-15 PROCEDURE — 84100 ASSAY OF PHOSPHORUS: CPT

## 2021-11-15 PROCEDURE — 36415 COLL VENOUS BLD VENIPUNCTURE: CPT

## 2021-11-15 PROCEDURE — 65660000001 HC RM ICU INTERMED STEPDOWN

## 2021-11-15 PROCEDURE — 74011250636 HC RX REV CODE- 250/636: Performed by: INTERNAL MEDICINE

## 2021-11-15 PROCEDURE — 74011250636 HC RX REV CODE- 250/636: Performed by: HOSPITALIST

## 2021-11-15 PROCEDURE — 97162 PT EVAL MOD COMPLEX 30 MIN: CPT

## 2021-11-15 RX ORDER — CINACALCET 30 MG/1
30 TABLET, FILM COATED ORAL 2 TIMES DAILY WITH MEALS
Status: DISCONTINUED | OUTPATIENT
Start: 2021-11-15 | End: 2021-11-16

## 2021-11-15 RX ORDER — MAGNESIUM SULFATE HEPTAHYDRATE 40 MG/ML
2 INJECTION, SOLUTION INTRAVENOUS ONCE
Status: COMPLETED | OUTPATIENT
Start: 2021-11-15 | End: 2021-11-15

## 2021-11-15 RX ADMIN — HEPARIN SODIUM 5000 UNITS: 5000 INJECTION INTRAVENOUS; SUBCUTANEOUS at 14:00

## 2021-11-15 RX ADMIN — SODIUM CHLORIDE 125 ML/HR: 900 INJECTION, SOLUTION INTRAVENOUS at 20:51

## 2021-11-15 RX ADMIN — MAGNESIUM SULFATE HEPTAHYDRATE 2 G: 40 INJECTION, SOLUTION INTRAVENOUS at 16:23

## 2021-11-15 RX ADMIN — MAGNESIUM SULFATE HEPTAHYDRATE 2 G: 40 INJECTION, SOLUTION INTRAVENOUS at 08:57

## 2021-11-15 RX ADMIN — SODIUM CHLORIDE 125 ML/HR: 900 INJECTION, SOLUTION INTRAVENOUS at 06:56

## 2021-11-15 RX ADMIN — Medication 10 ML: at 21:17

## 2021-11-15 RX ADMIN — HEPARIN SODIUM 5000 UNITS: 5000 INJECTION INTRAVENOUS; SUBCUTANEOUS at 03:24

## 2021-11-15 RX ADMIN — AMLODIPINE BESYLATE 5 MG: 5 TABLET ORAL at 08:57

## 2021-11-15 RX ADMIN — Medication 10 ML: at 06:00

## 2021-11-15 RX ADMIN — ROSUVASTATIN 10 MG: 10 TABLET, FILM COATED ORAL at 21:15

## 2021-11-15 RX ADMIN — CINACALCET HYDROCHLORIDE 30 MG: 30 TABLET, FILM COATED ORAL at 16:23

## 2021-11-15 NOTE — PROGRESS NOTES
Comprehensive Nutrition Assessment    Type and Reason for Visit: Initial    Nutrition Recommendations/Plan:    1. Modify to Regular diet, add once daily Ensure Enlive. 2. Standing daily weights. 3. Continue to monitor/replete electrolytes as indicated. Nutrition Assessment:    No past medical history on file. Pt admitted with fatigue, weakness, constipation, and transient confusion. Referred to the ED from PCP with abnormal lab value - hypercalcemia, severe hyperparathyroidism. Nephrology consulted with suspected VÍCTOR, imaging showing R paratracheal mass-possible malignancy. Nutrition review completed for pt with low body wt/BMI. When attempting to complete visit with her, phone rang and she began talking with a family member. Came back after visiting other patients on the unit and was still speaking on the telephone. She was able to tell me her appetite has been poor x 1 week and requested chicken noodle soup for dinner. She is visually noted to have some mild/mod fat wasting noted to arms, temples, and clavicles. Will add once daily ONS (Ensure Enlive) and continue to monitor pt status. Will also liberalize her diet to Regular in attempt to open up additional PO options for her. Malnutrition Assessment:  Malnutrition Status: Moderate malnutrition    Context:  Acute illness     Findings of the 6 clinical characteristics of malnutrition:   Energy Intake:  1 - 75% or less of est energy req for 7 or more days  Weight Loss:  Unable to assess     Body Fat Loss:  1 - Mild body fat loss, Buccal region, Triceps, Orbital   Muscle Mass Loss:  No significant muscle mass loss, Clavicles (pectoralis & deltoids), Temples (temporalis)  Fluid Accumulation:  No significant fluid accumulation,     Strength:  Not performed     Nutritionally Significant Medications: Mg sulfate; IVFs - NaCl @ 125 ml/hr    Estimated Daily Nutrient Needs:  Energy (kcal): 1255 (MSJ x 1.2 x 1.1);  Weight Used for Energy Requirements: Current  Protein (g): 60 (1.2 g/kg); Weight Used for Protein Requirements: Current  Fluid (ml/day): ~1300; Method Used for Fluid Requirements: 1 ml/kcal    Nutrition Related Findings:       BM: No BM recorded  Edema: None  Wounds:  None       Current Nutrition Therapies:   Diet: Cardiac  Supplements: None presently ordered  Additional Caloric Sources: None    Meal intake: No data found. Supplement Intake: No data found. Anthropometric Measures:  · Height:  5' 6\" (167.6 cm)  · Current Body Wt:  48.6 kg (107 lb 2.3 oz)   · Admission Body Wt:       · Ideal Body Wt:  130:  82.4 %   · BMI Categories:  Underweight (BMI less than 22) age over 72     Wt Readings from Last 10 Encounters:   11/15/21 48.6 kg (107 lb 3.2 oz)       Nutrition Diagnosis:   · Underweight related to inadequate protein-energy intake as evidenced by poor intake      Nutrition Interventions:   Food and/or Nutrient Delivery: Start oral nutrition supplement  Nutrition Education and Counseling: No recommendations at this time  Coordination of Nutrition Care: Continue to monitor while inpatient    Goals:  Improved PO intakes >50% daily meals. Nutrition Monitoring and Evaluation:   Behavioral-Environmental Outcomes: None identified  Food/Nutrient Intake Outcomes: Food and nutrient intake, Supplement intake  Physical Signs/Symptoms Outcomes: Biochemical data, Meal time behavior, Weight    Discharge Planning:     Too soon to determine     Nataliia Brito RD     Contact via [address] [place] [address]

## 2021-11-15 NOTE — PROGRESS NOTES
[address] [facility] Adult  Hospitalist Group                                                                                          Hospitalist Progress Note  Aleksandra Mcleod MD  Answering service: [phone] OR [phone] from in house phone        Date of Service:  11/15/2021  NAME:  Arina Whitt  :  1935  MRN:  [id number]      Admission Summary:   Arina Whitt is a 81 y.o. female who presents with fatigue constipation and abnormal lab test      This is an 67-year-old female who presents with fatigue, weakness, constipation and some transient confusion.  She was seen yesterday by her primary physician is Dr. Sana Ardon from [facility] had some blood drawn for routine and Dr. Sana Ardon called the daughter today and told her that the calcium was extremely high and she needed to come to the hospital. Daquan Herbert has had no problems with chest pain, shortness of breath, nausea or vomiting and no other GI or  symptoms noted.  Patient is alert and oriented currently with no acute complaints. Per pt, noticed lost weight last several years \" previous pants and clothes all too large for her now\" . Denied dysphagia.  Denied voice changes.  No history of cancer.  .       Interval history / Subjective:     F/u hypercalcemia   Feels her weakness to be improving  Creatinine slightly worse  Calcium same as yesterday  Assessment & Plan:     Severe hypercalcemia: etiology unclear  VÍCTOR  Primary hyperparathyroidism  - pt is taking thiazide diuretics at home, currently on hold  -Calcium improved from 16.6 to 14.8  -Creatinine improved from 2.27 to 1.88  -Increased intact PTH, follow PTHrP  -renal US unremarkable  -Continue IV Fluids  -Continue Sensipar  -Appreciate Nephrology    Right paratracheal mass  -Appreciate ENT, sestamibi scan, CT neck with contrast once renal function improves    Hypokalemia: replace as needed  Hyponatremia: improving with fluids  Hypomagnesemia: replaced this am, will give another dose  Abnormal admission EKG, will repeat. echo  HTN: hold thiazide , restart norvasc       Renal diet    PT/OT SNF    Code status: FULL CODE  DVT prophylaxis: Heparin  Spoke to Dori [phone] at patient's request    Plan: On sensipar, continue IV fluids    Care Plan discussed with: Patient/Family  Anticipated Disposition: TBD  Anticipated Discharge: Greater than 48 hours     Hospital Problems  Date Reviewed: 11/14/2021          Codes Class Noted POA    * (Principal) Hypercalcemia ICD-10-CM: Y46.54  ICD-9-CM: 275.42  11/13/2021 Yes        Lung mass ICD-10-CM: R91.8  ICD-9-CM: 786.6  11/13/2021 Unknown                Review of Systems:   A comprehensive review of systems was negative except for that written in the HPI. Vital Signs:    Last 24hrs VS reviewed since prior progress note. Most recent are:  Visit Vitals  /63   Pulse 61   Temp 97.9 °F (36.6 °C)   Resp 15   Ht 5' 6\" (1.676 m)   Wt 48.6 kg (107 lb 3.2 oz)   SpO2 98%   BMI 17.30 kg/m²         Intake/Output Summary (Last 24 hours) at 11/15/2021 1442  Last data filed at 11/15/2021 3412  Gross per 24 hour   Intake    Output 450 ml   Net -450 ml        Physical Examination:     I had a face to face encounter with this patient and independently examined them on 11/15/2021 as outlined below:          Constitutional:  No acute distress   ENT:  Oral mucosa moist, oropharynx benign. Resp:  CTA bilaterally. No wheezing/rhonchi/rales. No accessory muscle use   CV:  Regular rhythm, normal rate, no murmurs, gallops, rubs    GI:  Soft, non distended, non tender. normoactive bowel sounds, no hepatosplenomegaly     Musculoskeletal:  No edema, warm, 2+ pulses throughout    Neurologic:  Moves all extremities.   AAOx3, CN II-XII reviewed            Data Review:    Review and/or order of clinical lab test      Labs:     Recent Labs     11/14/21  0414 11/13/21  1339   WBC 7.5 8.3   HGB 10.1* 12.4   HCT 31.5* 38.4    211     Recent Labs     11/15/21  0340 11/14/21  0414 11/13/21  1340   * 134* 130*   K 3.6 3.3* 4.0    105 96*   CO2 25 25 27   BUN 52* 56* 66*   CREA 1.95* 1.88* 2.27*   GLU 81 88 119*   CA 14.7* 14.8* 16.6*   MG 1.4* 1.7  --    PHOS 2.5* 2.8  --      Recent Labs     11/13/21  1340   ALT 20   AP 58   TBILI 0.6   TP 8.2   ALB 3.6   GLOB 4.6*     No results for input(s): INR, PTP, APTT, INREXT, INREXT in the last 72 hours. No results for input(s): FE, TIBC, PSAT, FERR in the last 72 hours. No results found for: FOL, RBCF   No results for input(s): PH, PCO2, PO2 in the last 72 hours. No results for input(s): CPK, CKNDX, TROIQ in the last 72 hours.     No lab exists for component: CPKMB  No results found for: CHOL, CHOLX, CHLST, CHOLV, HDL, HDLP, LDL, LDLC, DLDLP, TGLX, TRIGL, TRIGP, CHHD, CHHDX  No results found for: GLUCPOC  No results found for: COLOR, APPRN, SPGRU, REFSG, KATY, PROTU, GLUCU, KETU, BILU, UROU, IRASEMA, LEUKU, GLUKE, EPSU, BACTU, WBCU, RBCU, CASTS, UCRY      Medications Reviewed:     Current Facility-Administered Medications   Medication Dose Route Frequency    cinacalcet (SENSIPAR) tablet 30 mg  30 mg Oral BID WITH MEALS    rosuvastatin (CRESTOR) tablet 10 mg  10 mg Oral QHS    sodium chloride (NS) flush 5-40 mL  5-40 mL IntraVENous Q8H    sodium chloride (NS) flush 5-40 mL  5-40 mL IntraVENous PRN    acetaminophen (TYLENOL) tablet 650 mg  650 mg Oral Q6H PRN    Or    acetaminophen (TYLENOL) suppository 650 mg  650 mg Rectal Q6H PRN    polyethylene glycol (MIRALAX) packet 17 g  17 g Oral DAILY PRN    ondansetron (ZOFRAN ODT) tablet 4 mg  4 mg Oral Q8H PRN    Or    ondansetron (ZOFRAN) injection 4 mg  4 mg IntraVENous Q6H PRN    amLODIPine (NORVASC) tablet 5 mg  5 mg Oral DAILY    heparin (porcine) injection 5,000 Units  5,000 Units SubCUTAneous Q12H    0.9% sodium chloride infusion  125 mL/hr IntraVENous CONTINUOUS     ______________________________________________________________________  EXPECTED LENGTH OF STAY: 2d 14h  ACTUAL LENGTH OF STAY:          2                 Tracy Salguero MD

## 2021-11-15 NOTE — PROGRESS NOTES
CARLOS: Anticipate discharge home with family assistance v home with [facility]. Transportation likely in car with daughter. Reason for Admission:  Hypercalcemia, lung mass                   RUR Score:     12%                Plan for utilizing home health:    Waiting on PT/PT consults      PCP: First and Last name:  Theora Runner., MD     Name of Practice:    Are you a current patient: Yes/No: Yes   Approximate date of last visit: within the last week   Can you participate in a virtual visit with your PCP:                     Current Advanced Directive/Advance Care Plan: Full Code      Healthcare Decision Maker:   Click here to complete [address] including selection of the Healthcare Decision Maker Relationship (ie \"Primary\")                        Transition of Care Plan:  CM met with patient at bedside. Patient is alert and oriented x3. Demographics confirmed. Patient resides in a home with about 4 steps to enter. Patient resides on the main floor, but does have a basement. Patient reported that she lives alone, but her daughters live within walking distance. PCP confirmed. Patient was unsure about pharmacy. DME: walker. Patient is requesting an order for a rollator. No discharge concerns noted at this time. Emergency contact: Neal Trinh, daughter, [phone]    Medicare pt has received, reviewed, and signed 1st IM letter informing them of their right to appeal the discharge. Signed copied has been placed on pt bedside chart. Care Management Interventions  PCP Verified by CM: Yes (Dr. Tobias Amaral)  Mode of Transport at Discharge:  Other (see comment) (in car with daughter)  Georgiana Wood: No  Discharge Durable Medical Equipment: No  Physical Therapy Consult: Yes  Occupational Therapy Consult: Yes  Speech Therapy Consult: No  Support Systems: Other Family Member(s), Friend/Neighbor  Confirm Follow Up Transport: Family  The Plan for Transition of Care is Related to the Following Treatment Goals : home with family assistance vs [facility]  Discharge Location  Discharge Placement: Home with family assistance     Pao Schaefer, [address] [facility]  [phone]

## 2021-11-15 NOTE — PROGRESS NOTES
NAME: Ezekiel Galvan        :  1935        MRN:  [id number]                     Assessment   :                                               Plan:  VÍCTOR  Hyponatremia, mild  Severe hypercalcemia  Constipation  Anorexia  Right paratracheal mass/goiter  Primary hyperparathyroidism  HTN  Hypokalemia Creatinine 2.3 to 1.9mg/dl and holding; nml SHELLEY  Very high PTH (1720)- c/w primary HPT. Increase Sensipar to 30mg BID  Ct aggressive IV NS    PTHrp, SPEP, BERNARDA, 25 VD pending  Na 130 to 134 to 135  Calcium 16.6 to 14.8 to 14/7  K 3.6   Give IV Mag sulfate 2gm x1 dose             Subjective:     Chief Complaint:  Awake. Alert. Eating breakfast at the time of my visit. .    Review of Systems:    Symptom Y/N Comments  Symptom Y/N Comments   Fever/Chills    Chest Pain     Poor Appetite    Edema     Cough    Abdominal Pain     Sputum    Joint Pain     SOB/ARIZA    Pruritis/Rash     Nausea/vomit    Tolerating PT/OT     Diarrhea    Tolerating Diet     Constipation    Other       Could not obtain due to:      Objective:     VITALS:   Last 24hrs VS reviewed since prior progress note.  Most recent are:  Visit Vitals  /63   Pulse 63   Temp 97.9 °F (36.6 °C)   Resp 15   Ht 5' 6\" (1.676 m)   Wt 48.6 kg (107 lb 3.2 oz)   SpO2 98%   BMI 17.30 kg/m²       Intake/Output Summary (Last 24 hours) at 11/15/2021 1112  Last data filed at 11/15/2021 4965  Gross per 24 hour   Intake    Output 1500 ml   Net -1500 ml      Telemetry Reviewed:     PHYSICAL EXAM:  General: NAD  No edema  cta arpan    Lab Data Reviewed: (see below)    Medications Reviewed: (see below)    PMH/SH reviewed - no change compared to H&P  ________________________________________________________________________  Care Plan discussed with:  Patient Y    Family      RN     Care Manager                    Consultant:          Comments   >50% of visit spent in counseling and coordination of care ________________________________________________________________________  Edith Lema MD     Procedures: see electronic medical records for all procedures/Xrays and details which  were not copied into this note but were reviewed prior to creation of Plan. LABS:  Recent Labs     11/14/21  0414 11/13/21  1339   WBC 7.5 8.3   HGB 10.1* 12.4   HCT 31.5* 38.4    211     Recent Labs     11/15/21  0340 11/14/21 0414 11/13/21  1340   * 134* 130*   K 3.6 3.3* 4.0    105 96*   CO2 25 25 27   BUN 52* 56* 66*   CREA 1.95* 1.88* 2.27*   GLU 81 88 119*   CA 14.7* 14.8* 16.6*   MG 1.4* 1.7  --    PHOS 2.5* 2.8  --      Recent Labs     11/13/21  1340   AP 58   TP 8.2   ALB 3.6   GLOB 4.6*     No results for input(s): INR, PTP, APTT, INREXT, INREXT in the last 72 hours. No results for input(s): FE, TIBC, PSAT, FERR in the last 72 hours. No results found for: FOL, RBCF   No results for input(s): PH, PCO2, PO2 in the last 72 hours. No results for input(s): CPK, CKMB in the last 72 hours.     No lab exists for component: TROPONINI  No components found for: GLPOC  No results found for: COLOR, APPRN, SPGRU, REFSG, KATY, PROTU, GLUCU, KETU, BILU, UROU, IRASEMA, LEUKU, GLUKE, EPSU, BACTU, WBCU, RBCU, CASTS, UCRY    MEDICATIONS:  Current Facility-Administered Medications   Medication Dose Route Frequency    cinacalcet (SENSIPAR) tablet 30 mg  30 mg Oral BID WITH MEALS    rosuvastatin (CRESTOR) tablet 10 mg  10 mg Oral QHS    sodium chloride (NS) flush 5-40 mL  5-40 mL IntraVENous Q8H    sodium chloride (NS) flush 5-40 mL  5-40 mL IntraVENous PRN    acetaminophen (TYLENOL) tablet 650 mg  650 mg Oral Q6H PRN    Or    acetaminophen (TYLENOL) suppository 650 mg  650 mg Rectal Q6H PRN    polyethylene glycol (MIRALAX) packet 17 g  17 g Oral DAILY PRN    ondansetron (ZOFRAN ODT) tablet 4 mg  4 mg Oral Q8H PRN    Or    ondansetron (ZOFRAN) injection 4 mg  4 mg IntraVENous Q6H PRN    amLODIPine (NORVASC) tablet 5 mg  5 mg Oral DAILY    heparin (porcine) injection 5,000 Units  5,000 Units SubCUTAneous Q12H    0.9% sodium chloride infusion  125 mL/hr IntraVENous CONTINUOUS

## 2021-11-15 NOTE — PROGRESS NOTES
Problem: Self Care Deficits Care Plan (Adult)  Goal: *Acute Goals and Plan of Care (Insert Text)  Description:   FUNCTIONAL STATUS PRIOR TO ADMISSION: pt poor historian but stated she lives alone. Her home in on same property as other family. She has a RW which she seems to use when she is feeling weaker. She sponge bathes     HOME SUPPORT: has family on the property    Occupational Therapy Goals  Initiated 11/15/2021  1. Patient will perform standing ADLs at sink with least restrictive DME with supervision/set-up within 7 day(s). 2.  Patient will perform bathing with supervision/set-up within 7 day(s). 3.  Patient will perform upper body dressing and lower body dressing with supervision/set-up within 7 day(s). 4.  Patient will perform toilet transfers with supervision/set-up within 7 day(s). 5.  Patient will perform all aspects of toileting with supervision/set-up within 7 day(s). Outcome: Progressing Towards Goal       OCCUPATIONAL THERAPY EVALUATION  Patient: Destiny Goodrich (89 y.o. female)  Date: 11/15/2021  Primary Diagnosis: Hypercalcemia [E83.52]  Lung mass [R91.8]        Precautions: fall, bed alarm       ASSESSMENT  Based on the objective data described below, the patient presents with confusion (oriented x 1 then to place with prompting), impaired standing balance, risk for falls and decline in functional status s/p admission for hypercalcemia. Pt required additional time for commands and sequencing for bed mobility, donned/doffed gown with min A. Pt was min A to stand with posterior lean noted, needing HHA x 1-2 for balance. She requires setup to mod A for ADLs. Recommend SNF rehab at this time. Current Level of Function Impacting Discharge (ADLs/self-care): min A, fall risk, confusion    Functional Outcome Measure: The patient scored Total: 35/100 on the Barthel Index outcome measure which is indicative of being very dependent in basic self-care.        Other factors to consider for discharge: from  home     Patient will benefit from skilled therapy intervention to address the above noted impairments. PLAN :  Recommendations and Planned Interventions: self care training, functional mobility training, therapeutic exercise, balance training, therapeutic activities, endurance activities, patient education, home safety training, and family training/education    Frequency/Duration: Patient will be followed by occupational therapy 5 times a week to address goals. Recommendation for discharge: (in order for the patient to meet his/her long term goals)  Therapy up to 5 days/week in SNF setting    This discharge recommendation:  Has not yet been discussed the attending provider and/or case management    IF patient discharges home will need the following DME: TBD       SUBJECTIVE:   Patient stated I at at [facility].     OBJECTIVE DATA SUMMARY:   HISTORY:   No past medical history on file. No past surgical history on file. Expanded or extensive additional review of patient history:     Home Situation  Home Environment: Private residence  # Steps to Enter: 4  Rails to Enter: Yes  One/Two Story Residence: Two story  # of Interior Steps: 12  Living Alone: Yes  Support Systems: Other Family Member(s), Friend/Neighbor  Patient Expects to be Discharged toVF Cor[de-identified]ration  Current DME Used/Available at Home: Raised toilet seat, Walker, rolling    Hand dominance: Right    EXAMINATION OF PERFORMANCE DEFICITS:  Cognitive/Behavioral Status:  Neurologic State: Alert  Orientation Level: Oriented to person  Cognition: Impulsive; Memory loss; Impaired decision making  Perception: Cues to maintain midline in standing  Perseveration: No perseveration noted  Safety/Judgement: Decreased insight into deficits    Skin: intact    Edema: none noted    Hearing:   Auditory  Auditory Impairment: None    Vision/Perceptual:                           Acuity: Within Defined Limits    Corrective Lenses: Glasses    Range of Motion:    AROM: Generally decreased, functional                         Strength:    Strength: Generally decreased, functional                Coordination:  Coordination: Generally decreased, functional  Fine Motor Skills-Upper: Left Intact; Right Intact    Gross Motor Skills-Upper: Left Intact; Right Intact              Balance:  Sitting: Intact; Without support  Standing: Impaired; With support  Standing - Static: Fair (posterior lean)  Standing - Dynamic : Fair (HHA x 1-2)    Functional Mobility and Transfers for ADLs:  Bed Mobility:  Supine to Sit: Minimum assistance; Assist x1; Additional time    Transfers:  Sit to Stand: Minimum assistance; Assist x1; Additional time  Stand to Sit: Minimum assistance; Additional time  Bed to Chair: Minimum assistance (HHA)    ADL Assessment:  Feeding: Setup    Oral Facial Hygiene/Grooming: Setup (seated)    Bathing: Moderate assistance    Upper Body Dressing: Minimum assistance    Lower Body Dressing: Minimum assistance    Toileting: Minimum assistance; Moderate assistance                ADL Intervention and task modifications:                                     Cognitive Retraining  Safety/Judgement: Decreased insight into deficits      Functional Measure:    Barthel Index:  Bathin  Bladder: 5  Bowels: 10  Groomin  Dressin  Feedin  Mobility: 0  Stairs: 0  Toilet Use: 0  Transfer (Bed to Chair and Back): 10  Total: 35/100      The Barthel ADL Index: Guidelines  1. The index should be used as a record of what a patient does, not as a record of what a patient could do. 2. The main aim is to establish degree of independence from any help, physical or verbal, however minor and for whatever reason. 3. The need for supervision renders the patient not independent. 4. A patient's performance should be established using the best available evidence.  Asking the patient, friends/relatives and nurses are the usual sources, but direct observation and common sense are also important. However direct testing is not needed. 5. Usually the patient's performance over the preceding 24-48 hours is important, but occasionally longer periods will be relevant. 6. Middle categories imply that the patient supplies over 50 per cent of the effort. 7. Use of aids to be independent is allowed. Score Interpretation (from [address] [facility] 83)    Independent   60-79 Minimally independent   40-59 Partially dependent   20-39 Very dependent   <20 Totally dependent     -Jag Rhodes., Barthel, DMariellaW. (1965). Functional evaluation: the Barthel Index. [address] ([address] [place] [address] (1997). The Barthel activities of daily living index: self-reporting versus actual performance in the old (> or = 75 years). Journal [address] 45(7), 14 [facility], .MariellaM.F, Sharron Queen., James Brennan. (1999). Measuring the change in disability after inpatient rehabilitation; comparison of the responsiveness of the Barthel Index and Functional Douglas Measure. Journal of Neurology, Neurosurgery, and Psychiatry, 66(4), 055-357. Anna Marie Pham, N.J.A, EULA Norman, & King Danielson, M.A. (2004) Assessment of post-stroke quality of life in cost-effectiveness studies: The usefulness of the Barthel Index and the EuroQoL-5D. Quality of Life Research, 15, 378-09     Occupational Therapy Evaluation Charge Determination   History Examination Decision-Making   MEDIUM Complexity : Expanded review of history including physical, cognitive and psychosocial  history  MEDIUM Complexity : 3-5 performance deficits relating to physical, cognitive , or psychosocial skils that result in activity limitations and / or participation restrictions MEDIUM Complexity : Patient may present with comorbidities that affect occupational performnce.  Miniml to moderate modification of tasks or assistance (eg, physical or verbal ) with assesment(s) is necessary to enable patient to complete evaluation       Based on the above components, the patient evaluation is determined to be of the following complexity level: MEDIUM  Pain Rating:  none    Activity Tolerance:   Good    After treatment patient left in no apparent distress:    Sitting in chair, Call bell within reach, and Bed / chair alarm activated    COMMUNICATION/EDUCATION:   The patients plan of care was discussed with: Physical therapist and Registered nurse. Patient/family have participated as able in goal setting and plan of care. This patients plan of care is appropriate for delegation to [facility].     Thank you for this referral.  Nash Hutchinson, OT

## 2021-11-15 NOTE — PROGRESS NOTES
Problem: Mobility Impaired (Adult and Pediatric)  Goal: *Acute Goals and Plan of Care (Insert Text)  Description: FUNCTIONAL STATUS PRIOR TO ADMISSION: Patient was a limited historian during evaluation. She reports being modified independent with a RW prior to admission. HOME SUPPORT PRIOR TO ADMISSION: The patient lived alone with family on the same property as needed. Physical Therapy Goals  Initiated 11/15/2021  1. Patient will move from supine to sit and sit to supine  in bed with supervision/set-up within 7 day(s). 2.  Patient will transfer from bed to chair and chair to bed with modified independence using the least restrictive device within 7 day(s). 3.  Patient will perform sit to stand with modified independence within 7 day(s). 4.  Patient will ambulate with modified independence for 150 feet with the least restrictive device within 7 day(s). 5.  Patient will ascend/descend 4 stairs with 1 handrail(s) with modified independence within 7 day(s). Outcome: Progressing Towards Goal     PHYSICAL THERAPY EVALUATION  Patient: Michael Calloway (70 y.o. female)  Date: 11/15/2021  Primary Diagnosis: Hypercalcemia [E83.52]  Lung mass [R91.8]        Precautions: Fall       ASSESSMENT  Based on the objective data described below, the patient presents with mild confusion, delayed cognition, impaired balance, and decreased activity tolerance following admission for hypercalcemia and workup for a lung mass. Patient difficult to understand and required repetition for communication. Encouraged mobility to EOB but patient challenged with initiation and required increased time with specific cues to place bilateral feet on the floor. Noted a posteriorly shifted COG in stance and required minimal assist initially. Gait training completed x15' with HHA with decresed step clerance and minimal assist.    Per patient, she was living alone prior to admission. Concern for falls based on deficits outlined above. Recommend discharge to SNF level rehab vs home with 24 hour supervision and HHPT. Current Level of Function Impacting Discharge (mobility/balance): minimal assist for transfers and gait    Other factors to consider for discharge: lives alone     Patient will benefit from skilled therapy intervention to address the above noted impairments. PLAN :  Recommendations and Planned Interventions: bed mobility training, transfer training, gait training, therapeutic exercises, and therapeutic activities      Frequency/Duration: Patient will be followed by physical therapy:  5 times a week to address goals. Recommendation for discharge: (in order for the patient to meet his/her long term goals)  Therapy up to 5 days/week in SNF setting vs 24hr supervision with HHPT    This discharge recommendation:  Has not yet been discussed the attending provider and/or case management    IF patient discharges home will need the following DME: to be determined (TBD)         SUBJECTIVE:   Patient stated [facility] it [facility]?    OBJECTIVE DATA SUMMARY:   HISTORY:    No past medical history on file. No past surgical history on file. Personal factors and/or comorbidities impacting plan of care:     Home Situation  Home Environment: Private residence  # Steps to Enter: 4  Rails to Enter: Yes  One/Two Story Residence: Two story  # of Interior Steps: 12  Living Alone: Yes  Support Systems: Other Family Member(s), Friend/Neighbor  Patient Expects to be Discharged toF Cor[de-identified]ration  Current DME Used/Available at Home: Raised toilet seat, Walker, rolling    EXAMINATION/PRESENTATION/DECISION MAKING:   Critical Behavior:  Neurologic State: Alert  Orientation Level: Oriented to person  Cognition: Impulsive, Memory loss, Impaired decision making  Safety/Judgement: Decreased insight into deficits  Hearing:   Auditory  Auditory Impairment: None  Skin:    Edema:   Range Of Motion:  AROM: Generally decreased, functional Strength:    Strength: Generally decreased, functional                    Tone & Sensation:                                  Coordination:  Coordination: Generally decreased, functional  Vision:   Acuity: Within Defined Limits  Corrective Lenses: Glasses  Functional Mobility:  Bed Mobility:     Supine to Sit: Minimum assistance; Assist x1; Additional time        Transfers:  Sit to Stand: Minimum assistance; Assist x1; Additional time  Stand to Sit: Minimum assistance; Additional time        Bed to Chair: Minimum assistance (HHA)              Balance:   Sitting: Intact; Without support  Standing: Impaired; With support  Standing - Static: Fair (posterior lean)  Standing - Dynamic : Fair (HHA x 1-2)  Ambulation/Gait Training:  Distance (ft): 15 Feet (ft)  Assistive Device: Gait belt (HHA)  Ambulation - Level of Assistance: Minimal assistance     Gait Description (WDL): Exceptions to WDL  Gait Abnormalities: Decreased step clearance        Base of Support: Narrowed; Center of gravity altered     Speed/Mariella: Slow                          Physical Therapy Evaluation Charge Determination   History Examination Presentation Decision-Making   MEDIUM  Complexity : 1-2 comorbidities / personal factors will impact the outcome/ POC  MEDIUM Complexity : 3 Standardized tests and measures addressing body structure, function, activity limitation and / or participation in recreation  LOW Complexity : Stable, uncomplicated  LOW Complexity : FOTO score of       Based on the above components, the patient evaluation is determined to be of the following complexity level: LOW     Pain Rating:      Activity Tolerance:   Fair    After treatment patient left in no apparent distress:   Sitting in chair, Call bell within reach, and Bed / chair alarm activated    COMMUNICATION/EDUCATION:   The patients plan of care was discussed with: Registered nurse.      Fall prevention education was provided and the patient/caregiver indicated understanding., Patient/family have participated as able in goal setting and plan of care. , and Patient/family agree to work toward stated goals and plan of care.     Thank you for this referral.  Jaskaran Oro, PT, DPT   Time Calculation: 26 mins

## 2021-11-15 NOTE — PROGRESS NOTES
Bedside shift change report given to Ha Quintana (oncoming nurse) by Emeterio Dey LPN (offgoing nurse). Report included the following information SBAR, Kardex, Intake/Output, MAR, Recent Results and Cardiac Rhythm sinus.

## 2021-11-15 NOTE — PROGRESS NOTES
Bedside and Verbal shift change report given to Genia (oncoming nurse) by Carlo Cohn (offgoing nurse). Report included the following information SBAR, Kardex, MAR, Accordion, Recent Results and Cardiac Rhythm sinus.

## 2021-11-16 ENCOUNTER — APPOINTMENT (OUTPATIENT)
Dept: NON INVASIVE DIAGNOSTICS | Age: 86
DRG: 643 | End: 2021-11-16
Attending: HOSPITALIST
Payer: MEDICARE

## 2021-11-16 LAB
ALBUMIN SERPL-MCNC: 3.1 G/DL (ref 3.5–5)
ALBUMIN/GLOB SERPL: 0.7 {RATIO} (ref 1.1–2.2)
ALP SERPL-CCNC: 57 U/L (ref 45–117)
ALT SERPL-CCNC: 18 U/L (ref 12–78)
ANION GAP SERPL CALC-SCNC: 5 MMOL/L (ref 5–15)
AST SERPL-CCNC: 26 U/L (ref 15–37)
BILIRUB SERPL-MCNC: 0.3 MG/DL (ref 0.2–1)
BUN SERPL-MCNC: 42 MG/DL (ref 6–20)
BUN/CREAT SERPL: 23 (ref 12–20)
CALCIUM SERPL-MCNC: 14.2 MG/DL (ref 8.5–10.1)
CHLORIDE SERPL-SCNC: 105 MMOL/L (ref 97–108)
CO2 SERPL-SCNC: 24 MMOL/L (ref 21–32)
CREAT SERPL-MCNC: 1.85 MG/DL (ref 0.55–1.02)
ECHO AO ROOT DIAM: 3.02 CM
ECHO AV MEAN GRADIENT: 16.14 MMHG
ECHO AV PEAK GRADIENT: 22.69 MMHG
ECHO AV PEAK GRADIENT: 29.4 MMHG
ECHO AV PEAK VELOCITY: 238.16 CM/S
ECHO AV PEAK VELOCITY: 271.09 CM/S
ECHO AV VTI: 44.57 CM
ECHO EST RA PRESSURE: 3 MMHG
ECHO LA AREA 4C: 24.28 CM2
ECHO LA VOL 2C: 87.87 ML (ref 22–52)
ECHO LA VOL 4C: 81.15 ML (ref 22–52)
ECHO LA VOL BP: 97.12 ML (ref 22–52)
ECHO LA VOL/BSA BIPLANE: 63.48 ML/M2 (ref 16–28)
ECHO LA VOLUME INDEX A2C: 57.43 ML/M2 (ref 16–28)
ECHO LA VOLUME INDEX A4C: 53.04 ML/M2 (ref 16–28)
ECHO LV INTERNAL DIMENSION DIASTOLIC: 3.55 CM (ref 3.9–5.3)
ECHO LV INTERNAL DIMENSION SYSTOLIC: 2.52 CM
ECHO LV IVSD: 1.16 CM (ref 0.6–0.9)
ECHO LV MASS 2D: 110.3 G (ref 67–162)
ECHO LV MASS INDEX 2D: 72.1 G/M2 (ref 43–95)
ECHO LV POSTERIOR WALL DIASTOLIC: 0.9 CM (ref 0.6–0.9)
ECHO LVOT DIAM: 1.81 CM
ECHO MV A VELOCITY: 152.27 CM/S
ECHO MV AREA PHT: 3.62 CM2
ECHO MV AREA PHT: 5.08 CM2
ECHO MV E DECELERATION TIME (DT): 149.27 MS
ECHO MV E VELOCITY: 79.79 CM/S
ECHO MV E/A RATIO: 0.52
ECHO MV MAX VELOCITY: 168.38 CM/S
ECHO MV MEAN GRADIENT: 2.59 MMHG
ECHO MV PEAK GRADIENT: 11.34 MMHG
ECHO MV PRESSURE HALF TIME (PHT): 43.29 MS
ECHO MV PRESSURE HALF TIME (PHT): 60.81 MS
ECHO MV VTI: 28.58 CM
ECHO PV PEAK INSTANTANEOUS GRADIENT SYSTOLIC: 3.08 MMHG
ECHO RV TAPSE: 3.23 CM (ref 1.5–2)
ERYTHROCYTE [DISTWIDTH] IN BLOOD BY AUTOMATED COUNT: 11.7 % (ref 11.5–14.5)
GLOBULIN SER CALC-MCNC: 4.3 G/DL (ref 2–4)
GLUCOSE SERPL-MCNC: 111 MG/DL (ref 65–100)
HCT VFR BLD AUTO: 34.1 % (ref 35–47)
HGB BLD-MCNC: 10.9 G/DL (ref 11.5–16)
IGA SERPL-MCNC: 593 MG/DL (ref 64–422)
IGG SERPL-MCNC: 1520 MG/DL (ref 586–1602)
IGM SERPL-MCNC: 102 MG/DL (ref 26–217)
MAGNESIUM SERPL-MCNC: 2.9 MG/DL (ref 1.6–2.4)
MCH RBC QN AUTO: 28.5 PG (ref 26–34)
MCHC RBC AUTO-ENTMCNC: 32 G/DL (ref 30–36.5)
MCV RBC AUTO: 89.3 FL (ref 80–99)
NRBC # BLD: 0 K/UL (ref 0–0.01)
NRBC BLD-RTO: 0 PER 100 WBC
PHOSPHATE SERPL-MCNC: 2.5 MG/DL (ref 2.6–4.7)
PLATELET # BLD AUTO: 188 K/UL (ref 150–400)
PMV BLD AUTO: 12.3 FL (ref 8.9–12.9)
POTASSIUM SERPL-SCNC: 3.8 MMOL/L (ref 3.5–5.1)
PROT PATTERN SERPL IFE-IMP: ABNORMAL
PROT SERPL-MCNC: 7.4 G/DL (ref 6.4–8.2)
RBC # BLD AUTO: 3.82 M/UL (ref 3.8–5.2)
SODIUM SERPL-SCNC: 134 MMOL/L (ref 136–145)
T3FREE SERPL-MCNC: 2.2 PG/ML (ref 2.2–4)
TSH SERPL DL<=0.05 MIU/L-ACNC: 0.35 UIU/ML (ref 0.36–3.74)
WBC # BLD AUTO: 7 K/UL (ref 3.6–11)

## 2021-11-16 PROCEDURE — 93306 TTE W/DOPPLER COMPLETE: CPT | Performed by: SPECIALIST

## 2021-11-16 PROCEDURE — 97535 SELF CARE MNGMENT TRAINING: CPT

## 2021-11-16 PROCEDURE — 74011250636 HC RX REV CODE- 250/636: Performed by: HOSPITALIST

## 2021-11-16 PROCEDURE — 84100 ASSAY OF PHOSPHORUS: CPT

## 2021-11-16 PROCEDURE — 74011250637 HC RX REV CODE- 250/637: Performed by: HOSPITALIST

## 2021-11-16 PROCEDURE — 86800 THYROGLOBULIN ANTIBODY: CPT

## 2021-11-16 PROCEDURE — 65660000001 HC RM ICU INTERMED STEPDOWN

## 2021-11-16 PROCEDURE — 74011250637 HC RX REV CODE- 250/637: Performed by: INTERNAL MEDICINE

## 2021-11-16 PROCEDURE — 97116 GAIT TRAINING THERAPY: CPT

## 2021-11-16 PROCEDURE — 97530 THERAPEUTIC ACTIVITIES: CPT

## 2021-11-16 PROCEDURE — 85027 COMPLETE CBC AUTOMATED: CPT

## 2021-11-16 PROCEDURE — 84443 ASSAY THYROID STIM HORMONE: CPT

## 2021-11-16 PROCEDURE — 93306 TTE W/DOPPLER COMPLETE: CPT

## 2021-11-16 PROCEDURE — 80053 COMPREHEN METABOLIC PANEL: CPT

## 2021-11-16 PROCEDURE — 84481 FREE ASSAY (FT-3): CPT

## 2021-11-16 PROCEDURE — 36415 COLL VENOUS BLD VENIPUNCTURE: CPT

## 2021-11-16 PROCEDURE — 83735 ASSAY OF MAGNESIUM: CPT

## 2021-11-16 RX ORDER — CINACALCET 30 MG/1
60 TABLET, FILM COATED ORAL 2 TIMES DAILY WITH MEALS
Status: DISCONTINUED | OUTPATIENT
Start: 2021-11-16 | End: 2021-11-18

## 2021-11-16 RX ADMIN — AMLODIPINE BESYLATE 5 MG: 5 TABLET ORAL at 09:05

## 2021-11-16 RX ADMIN — HEPARIN SODIUM 5000 UNITS: 5000 INJECTION INTRAVENOUS; SUBCUTANEOUS at 03:50

## 2021-11-16 RX ADMIN — SODIUM CHLORIDE 125 ML/HR: 900 INJECTION, SOLUTION INTRAVENOUS at 05:47

## 2021-11-16 RX ADMIN — CINACALCET HYDROCHLORIDE 30 MG: 30 TABLET, FILM COATED ORAL at 09:05

## 2021-11-16 RX ADMIN — CINACALCET HYDROCHLORIDE 60 MG: 30 TABLET, FILM COATED ORAL at 16:35

## 2021-11-16 RX ADMIN — Medication 10 ML: at 14:06

## 2021-11-16 RX ADMIN — ROSUVASTATIN 10 MG: 10 TABLET, FILM COATED ORAL at 21:26

## 2021-11-16 RX ADMIN — HEPARIN SODIUM 5000 UNITS: 5000 INJECTION INTRAVENOUS; SUBCUTANEOUS at 16:35

## 2021-11-16 NOTE — PROGRESS NOTES
Problem: Mobility Impaired (Adult and Pediatric)  Goal: *Acute Goals and Plan of Care (Insert Text)  Description: FUNCTIONAL STATUS PRIOR TO ADMISSION: Patient was a limited historian during evaluation. She reports being modified independent with a RW prior to admission. HOME SUPPORT PRIOR TO ADMISSION: The patient lived alone with family on the same property as needed. Physical Therapy Goals  Initiated 11/15/2021  1. Patient will move from supine to sit and sit to supine  in bed with supervision/set-up within 7 day(s). 2.  Patient will transfer from bed to chair and chair to bed with modified independence using the least restrictive device within 7 day(s). 3.  Patient will perform sit to stand with modified independence within 7 day(s). 4.  Patient will ambulate with modified independence for 150 feet with the least restrictive device within 7 day(s). 5.  Patient will ascend/descend 4 stairs with 1 handrail(s) with modified independence within 7 day(s). Outcome: Progressing Towards Goal       PHYSICAL THERAPY TREATMENT  Patient: [facility] (83 y.o. female)  Date: 11/16/2021  Diagnosis: Hypercalcemia [E83.52]  Lung mass [R91.8]   Hypercalcemia       Precautions:    Chart, physical therapy assessment, plan of care and goals were reviewed. ASSESSMENT  Patient continues with skilled PT services and is progressing towards goals. Pt. Received in bed. Pt. Performed bed mobility with SBA and min verbal cues for sequencing of task. Pt. Sitting balance intact a EOB. Pt. Performed sit to stand with Min A x 2, max vc for hand placement and equipment management. Pt. Performed 92 ft ambulation with CGA, RW.   Pt. Incontinent of urine requesting to return to room. Pt. Returned to [facility] for BM, requiring assistance for toileting for cleaning. Pt. Transferred to recliner with min A. All needs in place. Current Level of Function Impacting Discharge (mobility/balance):  CGA with RW, standing/dynamic balance fair with constant support    Other factors to consider for discharge: DME?, Assistance with ADL, decreased activity tolerance, cognition         PLAN :  Patient continues to benefit from skilled intervention to address the above impairments. Continue treatment per established plan of care. to address goals. Recommendation for discharge: (in order for the patient to meet his/her long term goals)  Therapy up to 5 days/week in SNF setting vs HHPT with 24hour supervision    This discharge recommendation:  Has not yet been discussed the attending provider and/or case management    IF patient discharges home will need the following DME: to be determined (TBD)       SUBJECTIVE:   Patient stated I want to take that with us.  re: her purse    OBJECTIVE DATA SUMMARY:   Critical Behavior:  Neurologic State: Alert  Orientation Level: Oriented X4  Cognition: Appropriate decision making, Appropriate for age attention/concentration, Appropriate safety awareness, Follows commands  Safety/Judgement: Decreased insight into deficits  Functional Mobility Training:  Bed Mobility:     Supine to Sit: Stand-by assistance; Additional time              Transfers:  Sit to Stand: Minimum assistance; Additional time; Assist x2  Stand to Sit: Minimum assistance; Assist x1                             Balance:  Sitting: Intact; Without support  Standing: Impaired; With support  Standing - Static: Fair  Standing - Dynamic : Fair  Ambulation/Gait Training:  Distance (ft): 92 Feet (ft)  Assistive Device: Gait belt; Walker, rolling  Ambulation - Level of Assistance: Minimal assistance (for AD management )        Gait Abnormalities: Decreased step clearance        Base of Support: Narrowed     Speed/Mariella: Slow                       Stairs:               Therapeutic Exercises:     Pain Rating:  No complaints of pain    Activity Tolerance:   Poor and requires rest breaks    After treatment patient left in no apparent distress:   Sitting in chair, Call bell within reach, and Bed / chair alarm activated    COMMUNICATION/COLLABORATION:   The patients plan of care was discussed with: Registered nurse.      MOUNA Covarrubias

## 2021-11-16 NOTE — PROGRESS NOTES
CARLOS: Anticipate discharge home with home with [facility] PT/OT v SNF. Transportation likely in car with daughter. RUR: 12%    Disposition: CM spoke with patient on this date regarding PT/OTs recommendation for SNF v 24 hour supervision with HH. Patient requested that CM contact her daughter, Anthony Stein. CM spoke with Anthony Stein who is hesitant to have her mother go to a SNF. She would prefer [facility] and stated she can arrange additional help in the home. CM emailed patient's daughter a list of [facility] agencies. Daughter stated she will speak with her mother and let CM know the decision. Daughter also requested a rollator.  CM [facility] regarding the request.    Emergency contact:  Shanika Millard, daughter, [address] [facility], [address] [place] [address],[address]  [phone]

## 2021-11-16 NOTE — PROGRESS NOTES
Problem: Pressure Injury - Risk of  Goal: *Prevention of pressure injury  Description: Document Hayder Scale and appropriate interventions in the flowsheet. Outcome: Progressing Towards Goal  Note: Pressure Injury Interventions:  Sensory Interventions: Assess changes in LOC, Keep linens dry and wrinkle-free, Minimize linen layers    Moisture Interventions: Absorbent underpads, Internal/External urinary devices    Activity Interventions: Increase time out of bed    Mobility Interventions: Assess need for specialty bed    Nutrition Interventions: Document food/fluid/supplement intake                     Problem: Falls - Risk of  Goal: *Absence of Falls  Description: Document Elmer Fall Risk and appropriate interventions in the flowsheet.   Outcome: Progressing Towards Goal  Note: Fall Risk Interventions:  Mobility Interventions: Communicate number of staff needed for ambulation/transfer, Patient to call before getting OOB              Elimination Interventions: Call light in reach, Toileting schedule/hourly rounds              Problem: Patient Education: Go to Patient Education Activity  Goal: Patient/Family Education  Outcome: Progressing Towards Goal     Problem: Discharge Planning  Goal: *Discharge to safe environment  Outcome: Progressing Towards Goal

## 2021-11-16 NOTE — PROGRESS NOTES
2319  Pt /66 while at rest in bed laying on right side taken by tech. 0015  BP rechecked and is 169/71 while laying on her back and sitting up slightly. 0017  On call Dr. Mya Andrews [facility] for orders, told to  continue monitoring    0225  /57, pt sleeping on left side    0345  /47, pt sleeping on left side    Bedside and Verbal shift change report given to Shobha Jensen (oncoming nurse) by Mary Ann Dykes (offgoing nurse). Report included the following information SBAR, Kardex, MAR, Accordion, Recent Results and Cardiac Rhythm sinus.

## 2021-11-16 NOTE — PROGRESS NOTES
NAME: Spencer Siddiqi        :  1935        MRN:  [id number]                     Assessment   :                                               Plan:  VÍCTOR  Hyponatremia, mild  Severe hypercalcemia  Constipation  Anorexia  Right paratracheal mass/goiter  Primary hyperparathyroidism  HTN  Hypokalemia Creatinine 2.3 to 1.8mg/dl and holding; nml SHELLEY  Very high PTH (1720)- c/w primary HPT. Increase Increase Sensipar to 60mg BID  Ct aggressive IV NS    Na 130 to 134   Calcium 16.6 to 14.2 now  K 3.8    Discussed case with Dr. Luann Richardson           Subjective:     Chief Complaint:  Awake. Alert. Eating breakfast at the time of my visit. Review of Systems:    Symptom Y/N Comments  Symptom Y/N Comments   Fever/Chills    Chest Pain     Poor Appetite    Edema     Cough    Abdominal Pain     Sputum    Joint Pain     SOB/ARIZA    Pruritis/Rash     Nausea/vomit    Tolerating PT/OT     Diarrhea    Tolerating Diet     Constipation    Other       Could not obtain due to:      Objective:     VITALS:   Last 24hrs VS reviewed since prior progress note.  Most recent are:  Visit Vitals  BP (!) 153/60   Pulse 77   Temp 98.4 °F (36.9 °C)   Resp 17   Ht 5' 6\" (1.676 m)   Wt 48.5 kg (107 lb)   SpO2 100%   BMI 17.27 kg/m²       Intake/Output Summary (Last 24 hours) at 2021 1416  Last data filed at 2021 0844  Gross per 24 hour   Intake 4900 ml   Output 1225 ml   Net 3675 ml      Telemetry Reviewed:     PHYSICAL EXAM:  General: NAD  No edema  cta arpan    Lab Data Reviewed: (see below)    Medications Reviewed: (see below)    PMH/SH reviewed - no change compared to H&P  ________________________________________________________________________  Care Plan discussed with:  Patient Y    Family      RN     Care Manager                    Consultant:          Comments   >50% of visit spent in counseling and coordination of care ________________________________________________________________________  Carmencita Adams MD     Procedures: see electronic medical records for all procedures/Xrays and details which  were not copied into this note but were reviewed prior to creation of Plan. LABS:  Recent Labs     11/16/21 0416 11/14/21 0414   WBC 7.0 7.5   HGB 10.9* 10.1*   HCT 34.1* 31.5*    167     Recent Labs     11/16/21  0416 11/15/21  0340 11/14/21  0414   * 135* 134*   K 3.8 3.6 3.3*    105 105   CO2 24 25 25   BUN 42* 52* 56*   CREA 1.85* 1.95* 1.88*   * 81 88   CA 14.2* 14.7* 14.8*   MG 2.9* 1.4* 1.7   PHOS 2.5* 2.5* 2.8     Recent Labs     11/16/21 0416   AP 57   TP 7.4   ALB 3.1*   GLOB 4.3*     No results for input(s): INR, PTP, APTT, INREXT, INREXT in the last 72 hours. No results for input(s): FE, TIBC, PSAT, FERR in the last 72 hours. No results found for: FOL, RBCF   No results for input(s): PH, PCO2, PO2 in the last 72 hours. No results for input(s): CPK, CKMB in the last 72 hours.     No lab exists for component: TROPONINI  No components found for: GLPOC  No results found for: COLOR, APPRN, SPGRU, REFSG, KATY, PROTU, GLUCU, KETU, BILU, UROU, IRASEMA, LEUKU, GLUKE, EPSU, BACTU, WBCU, RBCU, CASTS, UCRY    MEDICATIONS:  Current Facility-Administered Medications   Medication Dose Route Frequency    cinacalcet (SENSIPAR) tablet 60 mg  60 mg Oral BID WITH MEALS    rosuvastatin (CRESTOR) tablet 10 mg  10 mg Oral QHS    sodium chloride (NS) flush 5-40 mL  5-40 mL IntraVENous Q8H    sodium chloride (NS) flush 5-40 mL  5-40 mL IntraVENous PRN    acetaminophen (TYLENOL) tablet 650 mg  650 mg Oral Q6H PRN    Or    acetaminophen (TYLENOL) suppository 650 mg  650 mg Rectal Q6H PRN    polyethylene glycol (MIRALAX) packet 17 g  17 g Oral DAILY PRN    ondansetron (ZOFRAN ODT) tablet 4 mg  4 mg Oral Q8H PRN    Or    ondansetron (ZOFRAN) injection 4 mg  4 mg IntraVENous Q6H PRN    amLODIPine (NORVASC) tablet 5 mg  5 mg Oral DAILY    heparin (porcine) injection 5,000 Units  5,000 Units SubCUTAneous Q12H    0.9% sodium chloride infusion  125 mL/hr IntraVENous CONTINUOUS

## 2021-11-16 NOTE — PROGRESS NOTES
Physical Therapy    Reviewed chart. Noted Pt's daughter requesting rollator for pt.  Will trial pt with rollator during next session for appropriateness

## 2021-11-16 NOTE — PROGRESS NOTES
Problem: Self Care Deficits Care Plan (Adult)  Goal: *Acute Goals and Plan of Care (Insert Text)  Description:   FUNCTIONAL STATUS PRIOR TO ADMISSION: pt poor historian but stated she lives alone. Her home in on same property as other family. She has a RW which she seems to use when she is feeling weaker. She sponge bathes     HOME SUPPORT: has family on the property    Occupational Therapy Goals  Initiated 11/15/2021  1. Patient will perform standing ADLs at sink with least restrictive DME with supervision/set-up within 7 day(s). 2.  Patient will perform bathing with supervision/set-up within 7 day(s). 3.  Patient will perform upper body dressing and lower body dressing with supervision/set-up within 7 day(s). 4.  Patient will perform toilet transfers with supervision/set-up within 7 day(s). 5.  Patient will perform all aspects of toileting with supervision/set-up within 7 day(s). Outcome: Progressing Towards Goal    OCCUPATIONAL THERAPY TREATMENT  Patient: Nehemiah Beverly (66 y.o. female)  Date: 11/16/2021  Diagnosis: Hypercalcemia [E83.52]  Lung mass [R91.8]   Hypercalcemia       Precautions:    Chart, occupational therapy assessment, plan of care, and goals were reviewed. ASSESSMENT  Patient continues with skilled OT services and is progressing towards goals. Pt received EOB, reported incontinence in brief. Pt was min A x 2 to stand from EOB, verbal cues for RW management and hand placement. Pt tolerated standing x 3 minutes for max to  total A brief management and hygiene. MIN A x 1 with RW to transfer to bedside chair. Pt setup with open packages and containers but able to feed self independently. She continues to require assistance with RW, up to min A x 2 to stand and assistance for ADLs. Noted family would like pt to return home. If this is the case, recommend HHOT/PT with 24 hour assist/support.      Current Level of Function Impacting Discharge (ADLs): min A to stand, generalized weakness    Other factors to consider for discharge: from home         PLAN :  Patient continues to benefit from skilled intervention to address the above impairments. Continue treatment per established plan of care to address goals. Recommend with staff: OOB to chair for meals, ELIZABETH, ABRAHAM x 1 with BSC    Recommend next OT session: standing ADLs    Recommendation for discharge: (in order for the patient to meet his/her long term goals)  Occupational therapy at least 2 days/week in the home AND ensure assist and/or supervision for safety with ADLs, functional mobility. Would benefit from 24 hour initially    This discharge recommendation:  Has been made in collaboration with the attending provider and/or case management    IF patient discharges home will need the following DME: pt's daughter would like for pt to trial a rollator       SUBJECTIVE:   Patient stated I think I am going to go.     OBJECTIVE DATA SUMMARY:   Cognitive/Behavioral Status:  Neurologic State: Alert  Orientation Level: Oriented X4  Cognition: Appropriate decision making; Appropriate for age attention/concentration; Appropriate safety awareness; Follows commands             Functional Mobility and Transfers for ADLs:  Bed Mobility:  Received sitting EOB    Transfers:  Sit to Stand: Minimum assistance; Additional time; Assist x2          Balance:  Sitting: Intact; Without support  Standing: Impaired; With support  Standing - Static: Fair  Standing - Dynamic : Fair    ADL Intervention:  Feeding  Container Management: Set-up  Cutting Food: Set-up  Food to Mouth: Independent  Drink to Mouth: Independent                        Lower Body Dressing Assistance  Protective Undergarmet: Maximum assistance    Toileting  Bowel Hygiene:  Total assistance (dependent)           Pain:  none    Activity Tolerance:   Good    After treatment patient left in no apparent distress:   Sitting in chair, Call bell within reach, and Bed / chair alarm activated    COMMUNICATION/COLLABORATION:   The patients plan of care was discussed with: Physical therapy assistant and Registered nurse.      Se Jauregui OT  Time Calculation: 14 mins

## 2021-11-16 NOTE — PROGRESS NOTES
Bedside shift change report given to Pallavi 1690 (oncoming nurse) by Yessica Deluna (offgoing nurse).  Report included the following information SBAR, Intake/Output, MAR, Recent Results and Cardiac Rhythm SR.

## 2021-11-17 LAB
ALBUMIN SERPL ELPH-MCNC: 3.5 G/DL (ref 2.9–4.4)
ALBUMIN SERPL-MCNC: 2.8 G/DL (ref 3.5–5)
ALBUMIN/GLOB SERPL: 0.7 {RATIO} (ref 1.1–2.2)
ALBUMIN/GLOB SERPL: 1.1 {RATIO} (ref 0.7–1.7)
ALP SERPL-CCNC: 55 U/L (ref 45–117)
ALPHA1 GLOB SERPL ELPH-MCNC: 0.2 G/DL (ref 0–0.4)
ALPHA2 GLOB SERPL ELPH-MCNC: 0.6 G/DL (ref 0.4–1)
ALT SERPL-CCNC: 15 U/L (ref 12–78)
ANION GAP SERPL CALC-SCNC: 6 MMOL/L (ref 5–15)
AST SERPL-CCNC: 20 U/L (ref 15–37)
B-GLOBULIN SERPL ELPH-MCNC: 0.9 G/DL (ref 0.7–1.3)
BILIRUB SERPL-MCNC: 0.3 MG/DL (ref 0.2–1)
BUN SERPL-MCNC: 36 MG/DL (ref 6–20)
BUN/CREAT SERPL: 21 (ref 12–20)
CALCIUM SERPL-MCNC: 13.9 MG/DL (ref 8.5–10.1)
CHLORIDE SERPL-SCNC: 106 MMOL/L (ref 97–108)
CO2 SERPL-SCNC: 23 MMOL/L (ref 21–32)
CREAT SERPL-MCNC: 1.74 MG/DL (ref 0.55–1.02)
ERYTHROCYTE [DISTWIDTH] IN BLOOD BY AUTOMATED COUNT: 12 % (ref 11.5–14.5)
GAMMA GLOB SERPL ELPH-MCNC: 1.6 G/DL (ref 0.4–1.8)
GLOBULIN SER CALC-MCNC: 3.3 G/DL (ref 2.2–3.9)
GLOBULIN SER CALC-MCNC: 4.1 G/DL (ref 2–4)
GLUCOSE SERPL-MCNC: 97 MG/DL (ref 65–100)
HCT VFR BLD AUTO: 30.7 % (ref 35–47)
HGB BLD-MCNC: 10.2 G/DL (ref 11.5–16)
M PROTEIN SERPL ELPH-MCNC: 0.4 G/DL
MAGNESIUM SERPL-MCNC: 2 MG/DL (ref 1.6–2.4)
MCH RBC QN AUTO: 29.1 PG (ref 26–34)
MCHC RBC AUTO-ENTMCNC: 33.2 G/DL (ref 30–36.5)
MCV RBC AUTO: 87.5 FL (ref 80–99)
NRBC # BLD: 0 K/UL (ref 0–0.01)
NRBC BLD-RTO: 0 PER 100 WBC
PHOSPHATE SERPL-MCNC: 3.1 MG/DL (ref 2.6–4.7)
PLATELET # BLD AUTO: 183 K/UL (ref 150–400)
PMV BLD AUTO: 12.1 FL (ref 8.9–12.9)
POTASSIUM SERPL-SCNC: 3.5 MMOL/L (ref 3.5–5.1)
PROT SERPL-MCNC: 6.8 G/DL (ref 6–8.5)
PROT SERPL-MCNC: 6.9 G/DL (ref 6.4–8.2)
RBC # BLD AUTO: 3.51 M/UL (ref 3.8–5.2)
SODIUM SERPL-SCNC: 135 MMOL/L (ref 136–145)
THYROGLOB AB SERPL-ACNC: <1 IU/ML (ref 0–0.9)
WBC # BLD AUTO: 9 K/UL (ref 3.6–11)

## 2021-11-17 PROCEDURE — 74011250636 HC RX REV CODE- 250/636: Performed by: HOSPITALIST

## 2021-11-17 PROCEDURE — 74011250637 HC RX REV CODE- 250/637: Performed by: HOSPITALIST

## 2021-11-17 PROCEDURE — 85027 COMPLETE CBC AUTOMATED: CPT

## 2021-11-17 PROCEDURE — 84100 ASSAY OF PHOSPHORUS: CPT

## 2021-11-17 PROCEDURE — 65660000001 HC RM ICU INTERMED STEPDOWN

## 2021-11-17 PROCEDURE — 36415 COLL VENOUS BLD VENIPUNCTURE: CPT

## 2021-11-17 PROCEDURE — 97116 GAIT TRAINING THERAPY: CPT

## 2021-11-17 PROCEDURE — 83735 ASSAY OF MAGNESIUM: CPT

## 2021-11-17 PROCEDURE — 74011250637 HC RX REV CODE- 250/637: Performed by: INTERNAL MEDICINE

## 2021-11-17 PROCEDURE — 97530 THERAPEUTIC ACTIVITIES: CPT

## 2021-11-17 PROCEDURE — 80053 COMPREHEN METABOLIC PANEL: CPT

## 2021-11-17 RX ORDER — AMLODIPINE BESYLATE 5 MG/1
10 TABLET ORAL DAILY
Status: DISCONTINUED | OUTPATIENT
Start: 2021-11-18 | End: 2021-11-25 | Stop reason: HOSPADM

## 2021-11-17 RX ORDER — AMLODIPINE BESYLATE 5 MG/1
5 TABLET ORAL ONCE
Status: COMPLETED | OUTPATIENT
Start: 2021-11-17 | End: 2021-11-17

## 2021-11-17 RX ORDER — POTASSIUM CHLORIDE 750 MG/1
40 TABLET, FILM COATED, EXTENDED RELEASE ORAL
Status: COMPLETED | OUTPATIENT
Start: 2021-11-17 | End: 2021-11-17

## 2021-11-17 RX ORDER — HYDRALAZINE HYDROCHLORIDE 20 MG/ML
10 INJECTION INTRAMUSCULAR; INTRAVENOUS
Status: DISCONTINUED | OUTPATIENT
Start: 2021-11-17 | End: 2021-11-25 | Stop reason: HOSPADM

## 2021-11-17 RX ADMIN — CINACALCET HYDROCHLORIDE 60 MG: 30 TABLET, FILM COATED ORAL at 18:25

## 2021-11-17 RX ADMIN — SODIUM CHLORIDE 125 ML/HR: 900 INJECTION, SOLUTION INTRAVENOUS at 11:17

## 2021-11-17 RX ADMIN — AMLODIPINE BESYLATE 5 MG: 5 TABLET ORAL at 08:33

## 2021-11-17 RX ADMIN — AMLODIPINE BESYLATE 5 MG: 5 TABLET ORAL at 11:17

## 2021-11-17 RX ADMIN — HYDRALAZINE HYDROCHLORIDE 10 MG: 20 INJECTION INTRAMUSCULAR; INTRAVENOUS at 18:25

## 2021-11-17 RX ADMIN — Medication 10 ML: at 13:33

## 2021-11-17 RX ADMIN — CINACALCET HYDROCHLORIDE 60 MG: 30 TABLET, FILM COATED ORAL at 08:32

## 2021-11-17 RX ADMIN — ROSUVASTATIN 10 MG: 10 TABLET, FILM COATED ORAL at 22:44

## 2021-11-17 RX ADMIN — POTASSIUM CHLORIDE 40 MEQ: 750 TABLET, FILM COATED, EXTENDED RELEASE ORAL at 13:31

## 2021-11-17 RX ADMIN — HEPARIN SODIUM 5000 UNITS: 5000 INJECTION INTRAVENOUS; SUBCUTANEOUS at 16:08

## 2021-11-17 RX ADMIN — HEPARIN SODIUM 5000 UNITS: 5000 INJECTION INTRAVENOUS; SUBCUTANEOUS at 03:59

## 2021-11-17 RX ADMIN — SODIUM CHLORIDE 125 ML/HR: 900 INJECTION, SOLUTION INTRAVENOUS at 02:00

## 2021-11-17 NOTE — PROGRESS NOTES
Bedside shift change report given to Aylin RN (oncoming nurse) by Colleen Banegas RN (offgoing nurse). Report included the following information SBAR, Kardex and Cardiac Rhythm SR/SB.

## 2021-11-17 NOTE — PROGRESS NOTES
CARLOS: Anticipate discharge home with home with [facility] PT/OT v SNF. Transportation likely in car with daughter.     RUR: 12%    Disposition:  CM contacted patient's daughter, Jewels Meredith, to inquire about choice for [facility] providers. Daughter stated they have no preference for providers. CM also discussed PTs concerns regarding patient's inability to use the brakes on the rollator. Daughter stated family will be able to assist with teaching patient how to successfully use the brakes. CM will notify PT and request the order for a rollator. CM also submitted [facility] referrals to [facility], [address] [place] [address].     [facility],[address]  [phone]

## 2021-11-17 NOTE — PROGRESS NOTES
Occupational Therapy    Patient chart reviewed up to date. At time of attempt, patient hypertensive with /95 while working with PT. RN administering meds. Plan to defer and follow-up when appropriate for additional activity.     Genia Patel, OTR/L

## 2021-11-17 NOTE — PROGRESS NOTES
Problem: Mobility Impaired (Adult and Pediatric)  Goal: *Acute Goals and Plan of Care (Insert Text)  Description: FUNCTIONAL STATUS PRIOR TO ADMISSION: Patient was a limited historian during evaluation. She reports being modified independent with a RW prior to admission. HOME SUPPORT PRIOR TO ADMISSION: The patient lived alone with family on the same property as needed. Physical Therapy Goals  Initiated 11/15/2021  1. Patient will move from supine to sit and sit to supine  in bed with supervision/set-up within 7 day(s). 2.  Patient will transfer from bed to chair and chair to bed with modified independence using the least restrictive device within 7 day(s). 3.  Patient will perform sit to stand with modified independence within 7 day(s). 4.  Patient will ambulate with modified independence for 150 feet with the least restrictive device within 7 day(s). 5.  Patient will ascend/descend 4 stairs with 1 handrail(s) with modified independence within 7 day(s). Outcome: Progressing Towards Goal       PHYSICAL THERAPY TREATMENT  Patient: Rosario Mcdowell (89 y.o. female)  Date: 11/17/2021  Diagnosis: Hypercalcemia [E83.52]  Lung mass [R91.8]   Hypercalcemia       Precautions:    Chart, physical therapy assessment, plan of care and goals were reviewed. ASSESSMENT  Patient continues with skilled PT services and is progressing towards goals. Pt. Received in recliner. Seated BP  173/51. Pt performed sit <> stand CGA BP standing 189/55 noted elevated, contacted RN. RN administered BP medication. Pt transferred to [facility] for BM, rollator with max vc and tactile cues for sequencing and device management. Pt performed 128ft ambulation with CGA to Min A with Rollator. Pt requires max vc for device management, did not verbalize or demonstrate understanding of how to safely use. Due to cognition pt would require supervision to use rollator.   Spoke with CM and advised that pt would need supervision and training for use of this device, would not recommend at this time, CM will pass information on to the family. Current Level of Function Impacting Discharge (mobility/balance): CGA with support/ balance fair with support    Other factors to consider for discharge: cognition, BP         PLAN :  Patient continues to benefit from skilled intervention to address the above impairments. Continue treatment per established plan of care. to address goals. Recommendation for discharge: (in order for the patient to meet his/her long term goals)  Physical therapy at least 2 days/week in the home AND ensure assist and/or supervision for safety with mobility, 24 hour supervision if unable SNF    This discharge recommendation:  Has not yet been discussed the attending provider and/or case management    IF patient discharges home will need the following DME: Pt. owns RW, pt family requesting rollator, due to safety and decreased cognition would not recommend at this time. CM will inform family       SUBJECTIVE:   Patient stated Estela Yao comes the water.     OBJECTIVE DATA SUMMARY:   Critical Behavior:  Neurologic State: Alert, Eyes open spontaneously  Orientation Level: Oriented X4  Cognition: Appropriate decision making, Appropriate for age attention/concentration, Appropriate safety awareness, Follows commands  Safety/Judgement: Decreased insight into deficits  Functional Mobility Training:  Bed Mobility:                    Transfers:  Sit to Stand: Contact guard assistance; Additional time  Stand to Sit: Contact guard assistance                             Balance:  Sitting: Intact; Without support  Standing: Impaired;  With support  Standing - Static: Fair  Standing - Dynamic : Fair  Ambulation/Gait Training:  Distance (ft): 128 Feet (ft)  Assistive Device: Gait belt; Walker, rollator  Ambulation - Level of Assistance: Contact guard assistance        Gait Abnormalities: Decreased step clearance        Base of Support: Narrowed     Speed/Mariella: Slow                       Stairs: Therapeutic Exercises:   Pain Rating:  No complaints of pain    Activity Tolerance:   Poor    After treatment patient left in no apparent distress:   Sitting in chair, Call bell within reach, and Bed / chair alarm activated    COMMUNICATION/COLLABORATION:   The patients plan of care was discussed with: Registered nurse.      MOUNA Covarrubias

## 2021-11-17 NOTE — PROGRESS NOTES
[address] [facility] Adult  Hospitalist Group                                                                                          Hospitalist Progress Note  Jeremie Gonzalez MD  Answering service: [phone] or [phone] from in house phone        Date of Service:  2021  NAME:  Cj Kirkpatrick  :  1935  MRN:  [id number]      Admission Summary:   Cj Kirkpatrick is a 81 y.o. female who presents with fatigue constipation and abnormal lab test      This is an 81-year-old female who presents with fatigue, weakness, constipation and some transient confusion.  She was seen yesterday by her primary physician is Dr. Simba Diaz from [facility] had some blood drawn for routine and Dr. Simba Diaz called the daughter today and told her that the calcium was extremely high and she needed to come to the hospital. Elena Botello has had no problems with chest pain, shortness of breath, nausea or vomiting and no other GI or  symptoms noted.  Patient is alert and oriented currently with no acute complaints. Per pt, noticed lost weight last several years \" previous pants and clothes all too large for her now\" . Denied dysphagia. Denied voice changes.  No history of cancer.  .       Interval history / Subjective:     F/u hypercalcemia   Feels her weakness to be improving  Creatinine slightly better  Calcium slight improvement  BP elevated  Assessment & Plan:     Severe hypercalcemia: etiology unclear  VÍCTOR  Primary hyperparathyroidism  - pt is taking thiazide diuretics at home, currently on hold  -Calcium improved slightly  -Creatinine improved slightly  -Increased intact PTH, follow PTHrP  -renal US unremarkable  -Continue IV Fluids  -Continue Sensipar  -Appreciate Nephrology    Right paratracheal mass  -Appreciate ENT, sestamibi scan, CT neck with contrast once renal function improves    Hypokalemia: replace as needed  Hyponatremia: monitor  Hypomagnesemia: replace as needed  Abnormal admission EKG. Echo EF >70%.  Normal wall motion  HTN: hold thiazide , will increase the dose of norvasc       Renal diet    PT/OT SNF but the daughter would prefer to take the patient home    Code status: FULL CODE  DVT prophylaxis: Heparin  Spoke to Dori [phone] at patient's request    Plan: On sensipar, continue IV fluids. Care Plan discussed with: Patient/Family  Anticipated Disposition: TBD  Anticipated Discharge: Greater than 48 hours     Hospital Problems  Date Reviewed: 11/14/2021          Codes Class Noted POA    * (Principal) Hypercalcemia ICD-10-CM: A44.01  ICD-9-CM: 275.42  11/13/2021 Yes        Lung mass ICD-10-CM: R91.8  ICD-9-CM: 786.6  11/13/2021 Unknown                Review of Systems:   A comprehensive review of systems was negative except for that written in the HPI. Vital Signs:    Last 24hrs VS reviewed since prior progress note. Most recent are:  Visit Vitals  BP (!) 156/56   Pulse 66   Temp 98 °F (36.7 °C)   Resp 17   Ht 5' 6\" (1.676 m)   Wt 48.4 kg (106 lb 11.2 oz)   SpO2 100%   BMI 17.22 kg/m²         Intake/Output Summary (Last 24 hours) at 11/17/2021 1026  Last data filed at 11/17/2021 0745  Gross per 24 hour   Intake 2538.84 ml   Output 1900 ml   Net 638.84 ml        Physical Examination:     I had a face to face encounter with this patient and independently examined them on 11/17/2021 as outlined below:          Constitutional:  No acute distress   ENT:  Oral mucosa moist, oropharynx benign. Resp:  CTA bilaterally. No wheezing/rhonchi/rales. No accessory muscle use   CV:  Regular rhythm, normal rate, no murmurs, gallops, rubs    GI:  Soft, non distended, non tender. normoactive bowel sounds, no hepatosplenomegaly     Musculoskeletal:  No edema, warm, 2+ pulses throughout    Neurologic:  Moves all extremities.   AAOx3, CN II-XII reviewed            Data Review:    Review and/or order of clinical lab test      Labs:     Recent Labs     11/17/21  0406 11/16/21  0416   WBC 9.0 7.0   HGB 10.2* 10.9*   HCT 30.7* 34.1*    188     Recent Labs     11/17/21  0406 11/16/21 0416 11/15/21  0340   * 134* 135*   K 3.5 3.8 3.6    105 105   CO2 23 24 25   BUN 36* 42* 52*   CREA 1.74* 1.85* 1.95*   GLU 97 111* 81   CA 13.9* 14.2* 14.7*   MG 2.0 2.9* 1.4*   PHOS 3.1 2.5* 2.5*     Recent Labs     11/17/21  0406 11/16/21 0416   ALT 15 18   AP 55 57   TBILI 0.3 0.3   TP 6.9 7.4   ALB 2.8* 3.1*   GLOB 4.1* 4.3*     No results for input(s): INR, PTP, APTT, INREXT, INREXT in the last 72 hours. No results for input(s): FE, TIBC, PSAT, FERR in the last 72 hours. No results found for: FOL, RBCF   No results for input(s): PH, PCO2, PO2 in the last 72 hours. No results for input(s): CPK, CKNDX, TROIQ in the last 72 hours.     No lab exists for component: CPKMB  No results found for: CHOL, CHOLX, CHLST, CHOLV, HDL, HDLP, LDL, LDLC, DLDLP, TGLX, TRIGL, TRIGP, CHHD, CHHDX  No results found for: GLUCPOC  No results found for: COLOR, APPRN, SPGRU, REFSG, KATY, PROTU, GLUCU, KETU, BILU, UROU, IRASEMA, LEUKU, GLUKE, EPSU, BACTU, WBCU, RBCU, CASTS, UCRY      Medications Reviewed:     Current Facility-Administered Medications   Medication Dose Route Frequency    cinacalcet (SENSIPAR) tablet 60 mg  60 mg Oral BID WITH MEALS    rosuvastatin (CRESTOR) tablet 10 mg  10 mg Oral QHS    sodium chloride (NS) flush 5-40 mL  5-40 mL IntraVENous Q8H    sodium chloride (NS) flush 5-40 mL  5-40 mL IntraVENous PRN    acetaminophen (TYLENOL) tablet 650 mg  650 mg Oral Q6H PRN    Or    acetaminophen (TYLENOL) suppository 650 mg  650 mg Rectal Q6H PRN    polyethylene glycol (MIRALAX) packet 17 g  17 g Oral DAILY PRN    ondansetron (ZOFRAN ODT) tablet 4 mg  4 mg Oral Q8H PRN    Or    ondansetron (ZOFRAN) injection 4 mg  4 mg IntraVENous Q6H PRN    amLODIPine (NORVASC) tablet 5 mg  5 mg Oral DAILY    heparin (porcine) injection 5,000 Units  5,000 Units SubCUTAneous Q12H    0.9% sodium chloride infusion  125 mL/hr IntraVENous CONTINUOUS ______________________________________________________________________  EXPECTED LENGTH OF STAY: 2d 14h  ACTUAL LENGTH OF STAY:          Joi Ureña MD

## 2021-11-17 NOTE — PROGRESS NOTES
800:    Bedside shift change report given to Stephen Fernandez (oncoming nurse) by Lisa Trammell (offgoing nurse). Report included the following information SBAR, Kardex, Intake/Output, MAR and Recent Results.

## 2021-11-17 NOTE — PROGRESS NOTES
NAME: Sundar Amaral        :  1935        MRN:  [id number]                     Assessment   :                                               Plan:  VÍCTOR  Hyponatremia, mild  Severe hypercalcemia  Constipation  Anorexia  Right paratracheal mass/goiter  Primary hyperparathyroidism  HTN  Hypokalemia Creatinine 2.3 to 1.7mg/dl; nml SHELLEY  Very high PTH (1720)- c/w primary HPT. Increase Increased Sensipar to 60mg BID yesterday      Na holding at 134-135   Calcium 16.6 to 13.9 now  K 3.5-> supplement oral KCl 40meq x1 dose    Discussed case with Dr. Aleida Arriola           Subjective:     Chief Complaint:  Awake. Alert. +constipated    Review of Systems:    Symptom Y/N Comments  Symptom Y/N Comments   Fever/Chills    Chest Pain     Poor Appetite    Edema     Cough    Abdominal Pain     Sputum    Joint Pain     SOB/ARIZA    Pruritis/Rash     Nausea/vomit    Tolerating PT/OT     Diarrhea    Tolerating Diet     Constipation    Other       Could not obtain due to:      Objective:     VITALS:   Last 24hrs VS reviewed since prior progress note.  Most recent are:  Visit Vitals  BP (!) 178/59 (BP 1 Location: Right upper arm, BP Patient Position: At rest)   Pulse 68   Temp 98.2 °F (36.8 °C)   Resp 15   Ht 5' 6\" (1.676 m)   Wt 48.4 kg (106 lb 11.2 oz)   SpO2 98%   BMI 17.22 kg/m²       Intake/Output Summary (Last 24 hours) at 2021 1637  Last data filed at 2021 0745  Gross per 24 hour   Intake 2538.84 ml   Output 1900 ml   Net 638.84 ml      Telemetry Reviewed:     PHYSICAL EXAM:  General: NAD  No edema  cta arpan    Lab Data Reviewed: (see below)    Medications Reviewed: (see below)    PMH/SH reviewed - no change compared to H&P  ________________________________________________________________________  Care Plan discussed with:  Patient Y    Family  Y    RN     Care Manager                    Consultant:          Comments   >50% of visit spent in counseling and coordination of care       ________________________________________________________________________  Sid Hung MD     Procedures: see electronic medical records for all procedures/Xrays and details which  were not copied into this note but were reviewed prior to creation of Plan. LABS:  Recent Labs     11/17/21 0406 11/16/21 0416   WBC 9.0 7.0   HGB 10.2* 10.9*   HCT 30.7* 34.1*    188     Recent Labs     11/17/21  0406 11/16/21  0416 11/15/21  0340   * 134* 135*   K 3.5 3.8 3.6    105 105   CO2 23 24 25   BUN 36* 42* 52*   CREA 1.74* 1.85* 1.95*   GLU 97 111* 81   CA 13.9* 14.2* 14.7*   MG 2.0 2.9* 1.4*   PHOS 3.1 2.5* 2.5*     Recent Labs     11/17/21 0406 11/16/21 0416   AP 55 57   TP 6.9 7.4   ALB 2.8* 3.1*   GLOB 4.1* 4.3*     No results for input(s): INR, PTP, APTT, INREXT, INREXT in the last 72 hours. No results for input(s): FE, TIBC, PSAT, FERR in the last 72 hours. No results found for: FOL, RBCF   No results for input(s): PH, PCO2, PO2 in the last 72 hours. No results for input(s): CPK, CKMB in the last 72 hours.     No lab exists for component: TROPONINI  No components found for: GLPOC  No results found for: COLOR, APPRN, SPGRU, REFSG, KATY, PROTU, GLUCU, KETU, BILU, UROU, IRASEMA, LEUKU, GLUKE, EPSU, BACTU, WBCU, RBCU, CASTS, UCRY    MEDICATIONS:  Current Facility-Administered Medications   Medication Dose Route Frequency    [START ON 11/18/2021] amLODIPine (NORVASC) tablet 10 mg  10 mg Oral DAILY    hydrALAZINE (APRESOLINE) 20 mg/mL injection 10 mg  10 mg IntraVENous Q6H PRN    cinacalcet (SENSIPAR) tablet 60 mg  60 mg Oral BID WITH MEALS    rosuvastatin (CRESTOR) tablet 10 mg  10 mg Oral QHS    sodium chloride (NS) flush 5-40 mL  5-40 mL IntraVENous Q8H    sodium chloride (NS) flush 5-40 mL  5-40 mL IntraVENous PRN    acetaminophen (TYLENOL) tablet 650 mg  650 mg Oral Q6H PRN    Or    acetaminophen (TYLENOL) suppository 650 mg  650 mg Rectal Q6H PRN    polyethylene glycol (MIRALAX) packet 17 g  17 g Oral DAILY PRN    ondansetron (ZOFRAN ODT) tablet 4 mg  4 mg Oral Q8H PRN    Or    ondansetron (ZOFRAN) injection 4 mg  4 mg IntraVENous Q6H PRN    heparin (porcine) injection 5,000 Units  5,000 Units SubCUTAneous Q12H    0.9% sodium chloride infusion  125 mL/hr IntraVENous CONTINUOUS

## 2021-11-18 LAB
ALBUMIN SERPL-MCNC: 2.7 G/DL (ref 3.5–5)
ALBUMIN/GLOB SERPL: 0.8 {RATIO} (ref 1.1–2.2)
ALP SERPL-CCNC: 55 U/L (ref 45–117)
ALT SERPL-CCNC: 21 U/L (ref 12–78)
ANION GAP SERPL CALC-SCNC: 6 MMOL/L (ref 5–15)
AST SERPL-CCNC: 25 U/L (ref 15–37)
BILIRUB SERPL-MCNC: 0.3 MG/DL (ref 0.2–1)
BUN SERPL-MCNC: 35 MG/DL (ref 6–20)
BUN/CREAT SERPL: 20 (ref 12–20)
CALCIUM SERPL-MCNC: 14.8 MG/DL (ref 8.5–10.1)
CALCIUM SERPL-MCNC: 15.3 MG/DL (ref 8.5–10.1)
CHLORIDE SERPL-SCNC: 106 MMOL/L (ref 97–108)
CO2 SERPL-SCNC: 24 MMOL/L (ref 21–32)
CREAT SERPL-MCNC: 1.79 MG/DL (ref 0.55–1.02)
ERYTHROCYTE [DISTWIDTH] IN BLOOD BY AUTOMATED COUNT: 12.1 % (ref 11.5–14.5)
GLOBULIN SER CALC-MCNC: 3.6 G/DL (ref 2–4)
GLUCOSE SERPL-MCNC: 95 MG/DL (ref 65–100)
HCT VFR BLD AUTO: 30.6 % (ref 35–47)
HGB BLD-MCNC: 10.1 G/DL (ref 11.5–16)
MAGNESIUM SERPL-MCNC: 1.5 MG/DL (ref 1.6–2.4)
MCH RBC QN AUTO: 28.8 PG (ref 26–34)
MCHC RBC AUTO-ENTMCNC: 33 G/DL (ref 30–36.5)
MCV RBC AUTO: 87.2 FL (ref 80–99)
NRBC # BLD: 0 K/UL (ref 0–0.01)
NRBC BLD-RTO: 0 PER 100 WBC
PHOSPHATE SERPL-MCNC: 3.8 MG/DL (ref 2.6–4.7)
PLATELET # BLD AUTO: 187 K/UL (ref 150–400)
PMV BLD AUTO: 11.9 FL (ref 8.9–12.9)
POTASSIUM SERPL-SCNC: 4.2 MMOL/L (ref 3.5–5.1)
PROT SERPL-MCNC: 6.3 G/DL (ref 6.4–8.2)
RBC # BLD AUTO: 3.51 M/UL (ref 3.8–5.2)
SODIUM SERPL-SCNC: 136 MMOL/L (ref 136–145)
WBC # BLD AUTO: 7.8 K/UL (ref 3.6–11)

## 2021-11-18 PROCEDURE — 80053 COMPREHEN METABOLIC PANEL: CPT

## 2021-11-18 PROCEDURE — 97116 GAIT TRAINING THERAPY: CPT

## 2021-11-18 PROCEDURE — 74011250636 HC RX REV CODE- 250/636: Performed by: HOSPITALIST

## 2021-11-18 PROCEDURE — 65270000029 HC RM PRIVATE

## 2021-11-18 PROCEDURE — 36415 COLL VENOUS BLD VENIPUNCTURE: CPT

## 2021-11-18 PROCEDURE — 74011250637 HC RX REV CODE- 250/637: Performed by: HOSPITALIST

## 2021-11-18 PROCEDURE — 74011250637 HC RX REV CODE- 250/637: Performed by: INTERNAL MEDICINE

## 2021-11-18 PROCEDURE — 82310 ASSAY OF CALCIUM: CPT

## 2021-11-18 PROCEDURE — 83735 ASSAY OF MAGNESIUM: CPT

## 2021-11-18 PROCEDURE — 85027 COMPLETE CBC AUTOMATED: CPT

## 2021-11-18 PROCEDURE — 84100 ASSAY OF PHOSPHORUS: CPT

## 2021-11-18 RX ORDER — CINACALCET 30 MG/1
90 TABLET, FILM COATED ORAL 2 TIMES DAILY WITH MEALS
Status: DISCONTINUED | OUTPATIENT
Start: 2021-11-18 | End: 2021-11-25 | Stop reason: HOSPADM

## 2021-11-18 RX ADMIN — HEPARIN SODIUM 5000 UNITS: 5000 INJECTION INTRAVENOUS; SUBCUTANEOUS at 16:08

## 2021-11-18 RX ADMIN — SODIUM CHLORIDE 125 ML/HR: 900 INJECTION, SOLUTION INTRAVENOUS at 12:31

## 2021-11-18 RX ADMIN — AMLODIPINE BESYLATE 10 MG: 5 TABLET ORAL at 09:33

## 2021-11-18 RX ADMIN — CINACALCET HYDROCHLORIDE 90 MG: 30 TABLET, FILM COATED ORAL at 16:08

## 2021-11-18 RX ADMIN — Medication 10 ML: at 16:08

## 2021-11-18 RX ADMIN — HEPARIN SODIUM 5000 UNITS: 5000 INJECTION INTRAVENOUS; SUBCUTANEOUS at 03:43

## 2021-11-18 RX ADMIN — HYDRALAZINE HYDROCHLORIDE 10 MG: 20 INJECTION INTRAMUSCULAR; INTRAVENOUS at 23:50

## 2021-11-18 RX ADMIN — CINACALCET HYDROCHLORIDE 60 MG: 30 TABLET, FILM COATED ORAL at 09:33

## 2021-11-18 RX ADMIN — ROSUVASTATIN 10 MG: 10 TABLET, FILM COATED ORAL at 21:41

## 2021-11-18 RX ADMIN — HYDRALAZINE HYDROCHLORIDE 10 MG: 20 INJECTION INTRAMUSCULAR; INTRAVENOUS at 02:50

## 2021-11-18 NOTE — PROGRESS NOTES
Problem: Mobility Impaired (Adult and Pediatric)  Goal: *Acute Goals and Plan of Care (Insert Text)  Description: FUNCTIONAL STATUS PRIOR TO ADMISSION: Patient was a limited historian during evaluation. She reports being modified independent with a RW prior to admission. HOME SUPPORT PRIOR TO ADMISSION: The patient lived alone with family on the same property as needed. Physical Therapy Goals  Initiated 11/15/2021  1. Patient will move from supine to sit and sit to supine  in bed with supervision/set-up within 7 day(s). 2.  Patient will transfer from bed to chair and chair to bed with modified independence using the least restrictive device within 7 day(s). 3.  Patient will perform sit to stand with modified independence within 7 day(s). 4.  Patient will ambulate with modified independence for 150 feet with the least restrictive device within 7 day(s). 5.  Patient will ascend/descend 4 stairs with 1 handrail(s) with modified independence within 7 day(s). Outcome: Progressing Towards Goal    PHYSICAL THERAPY TREATMENT  Patient: Ada Hood (50 y.o. female)  Date: 11/18/2021  Diagnosis: Hypercalcemia [E83.52]  Lung mass [R91.8]   Hypercalcemia       Precautions:    Chart, physical therapy assessment, plan of care and goals were reviewed. ASSESSMENT  Patient continues with skilled PT services and is progressing towards goals. Pt. Received seated in recliner. Pt performed sit to stand CGA to rollator. Pt educated on Rollator use and was able to operate with max vc and tactile cues. Pt. Performed 130 ft ambulation with rollator with CGA. Pt. Requiring min vc to keep the rollator close to her. Will require supervision and further training to safely use rollator. Pt returned to recliner all needs in place.       Current Level of Function Impacting Discharge (mobility/balance): CGA/ balance fair with support of rollator    Other factors to consider for discharge: requires supervision for mobility,  cognition         PLAN :  Patient continues to benefit from skilled intervention to address the above impairments. Continue treatment per established plan of care. to address goals. Recommendation for discharge: (in order for the patient to meet his/her long term goals)  Physical therapy at least 2 days/week in the home AND ensure assist and/or supervision for safety with mobility, 24 hour supervision    This discharge recommendation:  Has not yet been discussed the attending provider and/or case management    IF patient discharges home will need the following DME: rollator       SUBJECTIVE:   Patient stated is that floor wet?     OBJECTIVE DATA SUMMARY:   Critical Behavior:  Neurologic State: Alert  Orientation Level: Disoriented to time, Oriented to person, Oriented to place, Oriented to situation  Cognition: Appropriate decision making, Appropriate for age attention/concentration, Appropriate safety awareness, Follows commands  Safety/Judgement: Decreased insight into deficits  Functional Mobility Training:  Bed Mobility:                    Transfers:  Sit to Stand: Contact guard assistance; Additional time  Stand to Sit: Contact guard assistance                             Balance:  Sitting: Intact; Without support  Standing: Impaired; Without support  Standing - Static: Fair  Standing - Dynamic : Fair  Ambulation/Gait Training:  Distance (ft): 130 Feet (ft)  Assistive Device: Gait belt; Walker, rollator  Ambulation - Level of Assistance: Contact guard assistance        Gait Abnormalities: Decreased step clearance        Base of Support: Narrowed     Speed/Mariella: Slow                       Stairs:               Therapeutic Exercises:     Pain Rating:  No complaints of pain    Activity Tolerance:   requires rest breaks    After treatment patient left in no apparent distress:   Sitting in chair, Call bell within reach, and Bed / chair alarm activated    COMMUNICATION/COLLABORATION:   The patients plan of care was discussed with: Registered nurse.      MOUNA Escobedo

## 2021-11-18 NOTE — PROGRESS NOTES
CARLOS: Anticipate discharge home with home with HH PT/OT through [facility]. Transportation likely in car with daughter.     RUR: 12%    Disposition: Patient accepted for [facility] by [facility]. CM also submitted referral for rollator. CM will continue to follow for additional discharge needs.     Yaron Blank, [address] [place] [address],[address]  [phone]

## 2021-11-18 NOTE — PROGRESS NOTES
1026- Calcium 14.8 Reported by [facility]; Dr. Aishwarya Page notified; order received to recheck calcium level    1306- Calcium of 15.3 reported by [facility]. Dr. Jessica Wakefield notified via perfect serve.

## 2021-11-18 NOTE — PROGRESS NOTES
Bedside and Verbal shift change report given to [facility] nurse) by Abbey Cunningham (offgoing nurse). Report included the following information Kardex, Intake/Output, MAR, Recent Results and Cardiac Rhythm NSR.

## 2021-11-18 NOTE — PROGRESS NOTES
NAME: Sloan Vilchis        :  1935        MRN:  [id number]                     Assessment   :                                               Plan:  VÍCTOR  Hyponatremia, mild  Severe hypercalcemia  Constipation  Anorexia  Right paratracheal mass/goiter  Primary hyperparathyroidism  HTN  Hypokalemia Creatinine 2.3 to 1.7mg/dl; nml SHELLEY  Very high PTH (1720)- c/w primary HPT. Increase Increased Sensipar to 60mg BID on 21    Awaiting repeat lab work today    IV NS    Discussed case with RN           Subjective:     Chief Complaint:  Resting. RN reports patient did have a BM last night    Review of Systems:    Symptom Y/N Comments  Symptom Y/N Comments   Fever/Chills    Chest Pain     Poor Appetite    Edema     Cough    Abdominal Pain     Sputum    Joint Pain     SOB/ARIZA    Pruritis/Rash     Nausea/vomit    Tolerating PT/OT     Diarrhea    Tolerating Diet     Constipation    Other       Could not obtain due to:      Objective:     VITALS:   Last 24hrs VS reviewed since prior progress note.  Most recent are:  Visit Vitals  BP (!) 160/51 (BP 1 Location: Left upper arm, BP Patient Position: At rest)   Pulse 77   Temp 98 °F (36.7 °C)   Resp 16   Ht 5' 6\" (1.676 m)   Wt 48.4 kg (106 lb 11.2 oz)   SpO2 100%   BMI 17.22 kg/m²       Intake/Output Summary (Last 24 hours) at 2021 0843  Last data filed at 2021 0220  Gross per 24 hour   Intake    Output 1200 ml   Net -1200 ml      Telemetry Reviewed:     PHYSICAL EXAM:  General: NAD  No edema  cta arpan    Lab Data Reviewed: (see below)    Medications Reviewed: (see below)    PMH/SH reviewed - no change compared to H&P  ________________________________________________________________________  Care Plan discussed with:  Patient     Family      RN Y    Care Manager                    Consultant:          Comments   >50% of visit spent in counseling and coordination of care ________________________________________________________________________  Cydney Bear MD     Procedures: see electronic medical records for all procedures/Xrays and details which  were not copied into this note but were reviewed prior to creation of Plan. LABS:  Recent Labs     11/17/21 0406 11/16/21 0416   WBC 9.0 7.0   HGB 10.2* 10.9*   HCT 30.7* 34.1*    188     Recent Labs     11/17/21 0406 11/16/21 0416   * 134*   K 3.5 3.8    105   CO2 23 24   BUN 36* 42*   CREA 1.74* 1.85*   GLU 97 111*   CA 13.9* 14.2*   MG 2.0 2.9*   PHOS 3.1 2.5*     Recent Labs     11/17/21 0406 11/16/21 0416   AP 55 57   TP 6.9 7.4   ALB 2.8* 3.1*   GLOB 4.1* 4.3*     No results for input(s): INR, PTP, APTT, INREXT, INREXT in the last 72 hours. No results for input(s): FE, TIBC, PSAT, FERR in the last 72 hours. No results found for: FOL, RBCF   No results for input(s): PH, PCO2, PO2 in the last 72 hours. No results for input(s): CPK, CKMB in the last 72 hours.     No lab exists for component: TROPONINI  No components found for: GLPOC  No results found for: COLOR, APPRN, SPGRU, REFSG, KATY, PROTU, GLUCU, KETU, BILU, UROU, IRASEMA, LEUKU, GLUKE, EPSU, BACTU, WBCU, RBCU, CASTS, UCRY    MEDICATIONS:  Current Facility-Administered Medications   Medication Dose Route Frequency    amLODIPine (NORVASC) tablet 10 mg  10 mg Oral DAILY    hydrALAZINE (APRESOLINE) 20 mg/mL injection 10 mg  10 mg IntraVENous Q6H PRN    cinacalcet (SENSIPAR) tablet 60 mg  60 mg Oral BID WITH MEALS    rosuvastatin (CRESTOR) tablet 10 mg  10 mg Oral QHS    sodium chloride (NS) flush 5-40 mL  5-40 mL IntraVENous Q8H    sodium chloride (NS) flush 5-40 mL  5-40 mL IntraVENous PRN    acetaminophen (TYLENOL) tablet 650 mg  650 mg Oral Q6H PRN    Or    acetaminophen (TYLENOL) suppository 650 mg  650 mg Rectal Q6H PRN    polyethylene glycol (MIRALAX) packet 17 g  17 g Oral DAILY PRN    ondansetron (ZOFRAN ODT) tablet 4 mg  4 mg Oral Q8H PRN    Or    ondansetron (ZOFRAN) injection 4 mg  4 mg IntraVENous Q6H PRN    heparin (porcine) injection 5,000 Units  5,000 Units SubCUTAneous Q12H    0.9% sodium chloride infusion  125 mL/hr IntraVENous CONTINUOUS

## 2021-11-18 NOTE — PROGRESS NOTES
Bedside shift change report given to Jone Mtz (oncoming nurse) by Juliette Manuel RN (offgoing nurse).  Report included the following information SBAR, Kardex and Cardiac Rhythm SR.

## 2021-11-18 NOTE — PROGRESS NOTES
[address] [facility]  Hospitalist Group                                                                                          Hospitalist Progress Note  [place] MD  Answering service: [phone] OR [phone] from in house phone        Date of Service:  2021  NAME:  River Gonzalez  :  1935  MRN:  [id number]      Admission Summary:   River Gonzalez is a 81 y.o. female who presents with fatigue constipation and abnormal lab test      This is an 26-year-old female who presents with fatigue, weakness, constipation and some transient confusion.  She was seen yesterday by her primary physician is Dr. Denys Duque from [facility] had some blood drawn for routine and Dr. Denys Duque called the daughter today and told her that the calcium was extremely high and she needed to come to the hospital. Winsome Zee has had no problems with chest pain, shortness of breath, nausea or vomiting and no other GI or  symptoms noted.  Patient is alert and oriented currently with no acute complaints. Per pt, noticed lost weight last several years \" previous pants and clothes all too large for her now\" . Denied dysphagia. Denied voice changes.  No history of cancer.  .       Interval history / Subjective:   F/u hypercalcemia   Pt without specific complaints. Denies any pain. Assessment & Plan:     Severe hypercalcemia - Primary hyperparathyroidism   VÍCTOR  - pt is taking thiazide diuretics at home, currently on hold  -Calcium now worsening today, which raises concern for possible PTH secreting tumor.   -Increased intact PTH, follow PTHrP  -renal US unremarkable  -Continue IVF   -Increase Sensipar  -Nephrology following     Right paratracheal mass  - Appreciate ENT,   - Add sestamibi scan today  - Cannot get CT with IV contrast due to VÍCTOR and CKD. - Will ask surgery to re-eval once NM scan completed.      Hypokalemia: replace as needed  Hyponatremia: monitor  Hypomagnesemia: replace as needed  Abnormal admission EKG. Echo EF >70%. Normal wall motion  HTN: hold thiazide , will increase the dose of norvasc     Renal diet    PT/OT SNF but the daughter would prefer to take the patient home    Code status: FULL CODE  DVT prophylaxis: Heparin  Spoke to Dori [phone] at patient's request    Plan: On sensipar, continue IV fluids. BMP daily     Care Plan discussed with: Patient/Family  Anticipated Disposition: TBD  Anticipated Discharge: Greater than 48 hours     Hospital Problems  Date Reviewed: 11/14/2021          Codes Class Noted POA    * (Principal) Hypercalcemia ICD-10-CM: Y09.19  ICD-9-CM: 275.42  11/13/2021 Yes        Lung mass ICD-10-CM: R91.8  ICD-9-CM: 786.6  11/13/2021 Unknown                Review of Systems:   A comprehensive review of systems was negative except for that written in the HPI. Vital Signs:    Last 24hrs VS reviewed since prior progress note. Most recent are:  Visit Vitals  BP (!) 150/74 (BP 1 Location: Right upper arm, BP Patient Position: Sitting)   Pulse 76   Temp 97.8 °F (36.6 °C)   Resp 16   Ht 5' 6\" (1.676 m)   Wt 48.4 kg (106 lb 11.2 oz)   SpO2 98%   BMI 17.22 kg/m²         Intake/Output Summary (Last 24 hours) at 11/18/2021 1403  Last data filed at 11/18/2021 4616  Gross per 24 hour   Intake 2677.08 ml   Output 1850 ml   Net 827.08 ml        Physical Examination:     I had a face to face encounter with this patient and independently examined them on 11/18/2021 as outlined below:          Constitutional:  No acute distress    ENT:  Oral mucosa moist, oropharynx benign. Resp:  CTA bilaterally. No wheezing/rhonchi/rales. No accessory muscle use   CV:  Regular rhythm, normal rate, no murmurs, gallops, rubs    GI:  Soft, non distended, non tender. normoactive bowel sounds, no hepatosplenomegaly     Musculoskeletal:  No edema, warm, 2+ pulses throughout    Neurologic:  Moves all extremities.   AAOx3, CN II-XII reviewed            Data Review:    Review and/or order of clinical lab test      Labs:     Recent Labs     11/18/21  0909 11/17/21 0406   WBC 7.8 9.0   HGB 10.1* 10.2*   HCT 30.6* 30.7*    183     Recent Labs     11/18/21  1226 11/18/21  0909 11/17/21 0406 11/16/21 0416 11/16/21 0416   NA  --  136 135*  --  134*   K  --  4.2 3.5  --  3.8   CL  --  106 106  --  105   CO2  --  24 23  --  24   BUN  --  35* 36*  --  42*   CREA  --  1.79* 1.74*  --  1.85*   GLU  --  95 97  --  111*   CA 15.3* 14.8* 13.9*   < > 14.2*   MG  --  1.5* 2.0  --  2.9*   PHOS  --  3.8 3.1  --  2.5*    < > = values in this interval not displayed. Recent Labs     11/18/21  0909 11/17/21 0406 11/16/21 0416   ALT 21 15 18   AP 55 55 57   TBILI 0.3 0.3 0.3   TP 6.3* 6.9 7.4   ALB 2.7* 2.8* 3.1*   GLOB 3.6 4.1* 4.3*     No results for input(s): INR, PTP, APTT, INREXT, INREXT in the last 72 hours. No results for input(s): FE, TIBC, PSAT, FERR in the last 72 hours. No results found for: FOL, RBCF   No results for input(s): PH, PCO2, PO2 in the last 72 hours. No results for input(s): CPK, CKNDX, TROIQ in the last 72 hours.     No lab exists for component: CPKMB  No results found for: CHOL, CHOLX, CHLST, CHOLV, HDL, HDLP, LDL, LDLC, DLDLP, TGLX, TRIGL, TRIGP, CHHD, CHHDX  No results found for: GLUCPOC  No results found for: COLOR, APPRN, SPGRU, REFSG, KATY, PROTU, GLUCU, KETU, BILU, UROU, IRASEMA, LEUKU, GLUKE, EPSU, BACTU, WBCU, RBCU, CASTS, UCRY      Medications Reviewed:     Current Facility-Administered Medications   Medication Dose Route Frequency    amLODIPine (NORVASC) tablet 10 mg  10 mg Oral DAILY    hydrALAZINE (APRESOLINE) 20 mg/mL injection 10 mg  10 mg IntraVENous Q6H PRN    cinacalcet (SENSIPAR) tablet 60 mg  60 mg Oral BID WITH MEALS    rosuvastatin (CRESTOR) tablet 10 mg  10 mg Oral QHS    sodium chloride (NS) flush 5-40 mL  5-40 mL IntraVENous Q8H    sodium chloride (NS) flush 5-40 mL  5-40 mL IntraVENous PRN    acetaminophen (TYLENOL) tablet 650 mg  650 mg Oral Q6H PRN    Or    acetaminophen (TYLENOL) suppository 650 mg  650 mg Rectal Q6H PRN    polyethylene glycol (MIRALAX) packet 17 g  17 g Oral DAILY PRN    ondansetron (ZOFRAN ODT) tablet 4 mg  4 mg Oral Q8H PRN    Or    ondansetron (ZOFRAN) injection 4 mg  4 mg IntraVENous Q6H PRN    heparin (porcine) injection 5,000 Units  5,000 Units SubCUTAneous Q12H    0.9% sodium chloride infusion  125 mL/hr IntraVENous CONTINUOUS     ______________________________________________________________________  EXPECTED LENGTH OF STAY: 2d 14h  ACTUAL LENGTH OF STAY:          5                 Dar Pinto MD

## 2021-11-19 ENCOUNTER — APPOINTMENT (OUTPATIENT)
Dept: CT IMAGING | Age: 86
DRG: 643 | End: 2021-11-19
Attending: NURSE PRACTITIONER
Payer: MEDICARE

## 2021-11-19 ENCOUNTER — APPOINTMENT (OUTPATIENT)
Dept: GENERAL RADIOLOGY | Age: 86
DRG: 643 | End: 2021-11-19
Attending: INTERNAL MEDICINE
Payer: MEDICARE

## 2021-11-19 ENCOUNTER — APPOINTMENT (OUTPATIENT)
Dept: NUCLEAR MEDICINE | Age: 86
DRG: 643 | End: 2021-11-19
Attending: INTERNAL MEDICINE
Payer: MEDICARE

## 2021-11-19 LAB
ALBUMIN SERPL-MCNC: 3 G/DL (ref 3.5–5)
ALBUMIN/GLOB SERPL: 0.7 {RATIO} (ref 1.1–2.2)
ALP SERPL-CCNC: 59 U/L (ref 45–117)
ALT SERPL-CCNC: 24 U/L (ref 12–78)
ANION GAP SERPL CALC-SCNC: 9 MMOL/L (ref 5–15)
AST SERPL-CCNC: 32 U/L (ref 15–37)
BILIRUB SERPL-MCNC: 0.4 MG/DL (ref 0.2–1)
BUN SERPL-MCNC: 36 MG/DL (ref 6–20)
BUN/CREAT SERPL: 19 (ref 12–20)
CALCIUM SERPL-MCNC: 15.1 MG/DL (ref 8.5–10.1)
CHLORIDE SERPL-SCNC: 101 MMOL/L (ref 97–108)
CO2 SERPL-SCNC: 22 MMOL/L (ref 21–32)
CREAT SERPL-MCNC: 1.89 MG/DL (ref 0.55–1.02)
ERYTHROCYTE [DISTWIDTH] IN BLOOD BY AUTOMATED COUNT: 12.1 % (ref 11.5–14.5)
GLOBULIN SER CALC-MCNC: 4.2 G/DL (ref 2–4)
GLUCOSE SERPL-MCNC: 101 MG/DL (ref 65–100)
HCT VFR BLD AUTO: 34.1 % (ref 35–47)
HGB BLD-MCNC: 11.1 G/DL (ref 11.5–16)
MAGNESIUM SERPL-MCNC: 1.4 MG/DL (ref 1.6–2.4)
MCH RBC QN AUTO: 28.8 PG (ref 26–34)
MCHC RBC AUTO-ENTMCNC: 32.6 G/DL (ref 30–36.5)
MCV RBC AUTO: 88.6 FL (ref 80–99)
NRBC # BLD: 0 K/UL (ref 0–0.01)
NRBC BLD-RTO: 0 PER 100 WBC
PHOSPHATE SERPL-MCNC: 4.1 MG/DL (ref 2.6–4.7)
PLATELET # BLD AUTO: 230 K/UL (ref 150–400)
PMV BLD AUTO: 11.6 FL (ref 8.9–12.9)
POTASSIUM SERPL-SCNC: 3.6 MMOL/L (ref 3.5–5.1)
PROT SERPL-MCNC: 7.2 G/DL (ref 6.4–8.2)
RBC # BLD AUTO: 3.85 M/UL (ref 3.8–5.2)
SODIUM SERPL-SCNC: 132 MMOL/L (ref 136–145)
WBC # BLD AUTO: 12.9 K/UL (ref 3.6–11)

## 2021-11-19 PROCEDURE — 36415 COLL VENOUS BLD VENIPUNCTURE: CPT

## 2021-11-19 PROCEDURE — 74011250636 HC RX REV CODE- 250/636: Performed by: INTERNAL MEDICINE

## 2021-11-19 PROCEDURE — 74011250636 HC RX REV CODE- 250/636: Performed by: HOSPITALIST

## 2021-11-19 PROCEDURE — 74011250637 HC RX REV CODE- 250/637: Performed by: INTERNAL MEDICINE

## 2021-11-19 PROCEDURE — 84100 ASSAY OF PHOSPHORUS: CPT

## 2021-11-19 PROCEDURE — 97530 THERAPEUTIC ACTIVITIES: CPT

## 2021-11-19 PROCEDURE — 74011250637 HC RX REV CODE- 250/637: Performed by: HOSPITALIST

## 2021-11-19 PROCEDURE — 92611 MOTION FLUOROSCOPY/SWALLOW: CPT

## 2021-11-19 PROCEDURE — 80053 COMPREHEN METABOLIC PANEL: CPT

## 2021-11-19 PROCEDURE — 92526 ORAL FUNCTION THERAPY: CPT

## 2021-11-19 PROCEDURE — 85027 COMPLETE CBC AUTOMATED: CPT

## 2021-11-19 PROCEDURE — 92610 EVALUATE SWALLOWING FUNCTION: CPT

## 2021-11-19 PROCEDURE — A9500 TC99M SESTAMIBI: HCPCS

## 2021-11-19 PROCEDURE — 83735 ASSAY OF MAGNESIUM: CPT

## 2021-11-19 PROCEDURE — 65270000029 HC RM PRIVATE

## 2021-11-19 PROCEDURE — 74230 X-RAY XM SWLNG FUNCJ C+: CPT

## 2021-11-19 RX ORDER — TETRAKIS(2-METHOXYISOBUTYLISOCYANIDE)COPPER(I) TETRAFLUOROBORATE 1 MG/ML
22 INJECTION, POWDER, LYOPHILIZED, FOR SOLUTION INTRAVENOUS
Status: COMPLETED | OUTPATIENT
Start: 2021-11-19 | End: 2021-11-19

## 2021-11-19 RX ORDER — MAGNESIUM SULFATE HEPTAHYDRATE 40 MG/ML
2 INJECTION, SOLUTION INTRAVENOUS ONCE
Status: COMPLETED | OUTPATIENT
Start: 2021-11-19 | End: 2021-11-19

## 2021-11-19 RX ADMIN — MAGNESIUM SULFATE 2 G: 2 INJECTION INTRAVENOUS at 18:03

## 2021-11-19 RX ADMIN — HEPARIN SODIUM 5000 UNITS: 5000 INJECTION INTRAVENOUS; SUBCUTANEOUS at 18:03

## 2021-11-19 RX ADMIN — CINACALCET HYDROCHLORIDE 90 MG: 30 TABLET, FILM COATED ORAL at 08:51

## 2021-11-19 RX ADMIN — AMLODIPINE BESYLATE 10 MG: 5 TABLET ORAL at 08:50

## 2021-11-19 RX ADMIN — HEPARIN SODIUM 5000 UNITS: 5000 INJECTION INTRAVENOUS; SUBCUTANEOUS at 05:35

## 2021-11-19 RX ADMIN — TETRAKIS(2-METHOXYISOBUTYLISOCYANIDE)COPPER(I) TETRAFLUOROBORATE 22 MILLICURIE: 1 INJECTION, POWDER, LYOPHILIZED, FOR SOLUTION INTRAVENOUS at 08:00

## 2021-11-19 RX ADMIN — SODIUM CHLORIDE 125 ML/HR: 900 INJECTION, SOLUTION INTRAVENOUS at 22:46

## 2021-11-19 RX ADMIN — Medication 10 ML: at 22:51

## 2021-11-19 NOTE — PROGRESS NOTES
Problem: Dysphagia (Adult)  Goal: *Acute Goals and Plan of Care (Insert Text)  Description: Speech Pathology Goals    Initiated 11/19/2021  1. Patient will participate in imaging to objectively assess safety of the swallow. Outcome: Progressing Towards Goal     SPEECH LANGUAGE PATHOLOGY BEDSIDE SWALLOW EVALUATION  Patient: Elizabeth Morales (86 y.o. female)  Date: 11/19/2021  Primary Diagnosis: Hypercalcemia [E83.52]  Lung mass [R91.8]        Precautions:        ASSESSMENT :  Based on the objective data described below, the patient presents with elevated risk for prandial aspiration. Patient with audible pooled secretions prior to any PO with gurgly and wet vocal quality. Pt with immediate coughing following small sips of thin with increase in wet vocal quality. Daughter bedside reports that this is a significant change from presentation yesterday, and she also noted new speech difficulties including garbled speech. Given bedside presentation, concerned for risk of prandial aspiration, therefore recommend imaging to objectively assess safety of the swallow. NPO until MBS. Further recommendations dependant upon results of MBS. Patient will benefit from skilled intervention to address the above impairments. Patients rehabilitation potential is considered to be determined     PLAN :  Recommendations and Planned Interventions:  -- MBS to objectively assess safety of the swallow  -- NPO until MBS   -- Further recommendations dependant upon results of MBS  Frequency/Duration: Patient will be followed by speech-language pathology 3 times a week to address goals. Discharge Recommendations: To Be Determined     SUBJECTIVE:   Patient stated it doesn't feel right re:her swallow function. OBJECTIVE:   No past medical history on file. No past surgical history on file.   Prior Level of Function/Home Situation:   Home Situation  Home Environment: Private residence  # Steps to Enter: 4  Rails to Enter: Yes  One/Two Story Residence: Two story  # of Interior Steps: 12  Living Alone: Yes  Support Systems: Other Family Member(s), Friend/Neighbor  Patient Expects to be Discharged toVF Cor[de-identified]ration  Current DME Used/Available at Home: Raised toilet seat, Walker, rolling  Diet prior to admission: regular/thin  Current Diet:  regular/thin   Cognitive and Communication Status:  Neurologic State: Alert  Orientation Level: Oriented to person  Cognition: Follows commands  Perception: Appears intact  Perseveration: No perseveration noted  Safety/Judgement: Awareness of environment  Oral Assessment:  Oral Assessment  Labial: Decreased rate  Dentition: Natural  Oral Hygiene: excess oral secretions  Lingual: Decreased strength  Velum: Unable to visualize  Mandible: No impairment  Gag Reflex: Reduced  P.O. Trials:  Patient Position: upright in bed  Vocal quality prior to P.O.: Wet  Consistency Presented: Thin liquid  How Presented: SLP-fed/presented; Straw     Bolus Acceptance: No impairment  Bolus Formation/Control: Impaired  Type of Impairment: Delayed  Propulsion: Discoordination  Oral Residue: None  Initiation of Swallow: No impairment  Laryngeal Elevation: Weak  Aspiration Signs/Symptoms: Change vocal quality; Strong cough; Watery eyes; Increase in RR  Pharyngeal Phase Characteristics: Audible swallow; Effortful swallow  Effective Modifications: None          Oral Phase Severity: Moderate  Pharyngeal Phase Severity :  (at risk for prandial aspiration)    NOMS:   The NOMS functional outcome measure was used to quantify this patient's level of swallowing impairment. Based on the NOMS, the patient was determined to be at level 1 for swallow function       NOMS Swallowing Levels:  Level 1 (CN): NPO  Level 2 (CM): NPO but takes consistency in therapy  Level 3 (CL): Takes less than 50% of nutrition p.o. and continues with nonoral feedings; and/or safe with mod cues; and/or max diet restriction  Level 4 (CK):  Safe swallow but needs mod cues; and/or mod diet restriction; and/or still requires some nonoral feeding/supplements  Level 5 (CJ): Safe swallow with min diet restriction; and/or needs min cues  Level 6 (CI): Independent with p.o.; rare cues; usually self cues; may need to avoid some foods or needs extra time  Level 7 ([address] [place] [address]): Independent for all p.o.  SALOME. (2003). National Outcomes Measurement System (NOMS): Adult Speech-Language Pathology User's Guide. After treatment:   Patient left in no apparent distress in bed, Call bell within reach, Nursing notified, and Caregiver / family present    COMMUNICATION/EDUCATION:   Patient was educated regarding her deficit(s) of aspiration risk. She demonstrated Fair understanding as evidenced by appropriate participation. The patient's plan of care including recommendations, planned interventions, and recommended diet changes were discussed with: Registered nurse. Patient/family have participated as able in goal setting and plan of care. Patient/family agree to work toward stated goals and plan of care.     Thank you for this referral.  Farzad Shannon M.S. CF-SLP   Speech Language Pathologist     Time Calculation: 18 mins

## 2021-11-19 NOTE — PROGRESS NOTES
Problem: Mobility Impaired (Adult and Pediatric)  Goal: *Acute Goals and Plan of Care (Insert Text)  Description: FUNCTIONAL STATUS PRIOR TO ADMISSION: Patient was a limited historian during evaluation. She reports being modified independent with a RW prior to admission. HOME SUPPORT PRIOR TO ADMISSION: The patient lived alone with family on the same property as needed. Physical Therapy Goals  Initiated 11/15/2021  1. Patient will move from supine to sit and sit to supine  in bed with supervision/set-up within 7 day(s). 2.  Patient will transfer from bed to chair and chair to bed with modified independence using the least restrictive device within 7 day(s). 3.  Patient will perform sit to stand with modified independence within 7 day(s). 4.  Patient will ambulate with modified independence for 150 feet with the least restrictive device within 7 day(s). 5.  Patient will ascend/descend 4 stairs with 1 handrail(s) with modified independence within 7 day(s). Outcome: Progressing Towards Goal     PHYSICAL THERAPY TREATMENT  Patient: Jenny Sims (37 y.o. female)  Date: 11/19/2021  Diagnosis: Hypercalcemia [E83.52]  Lung mass [R91.8]   Hypercalcemia       Precautions:    Chart, physical therapy assessment, plan of care and goals were reviewed. ASSESSMENT  Patient continues with skilled PT services and is progressing towards goals. Transport present to take pt to test. Pt soiled required assistance with brief change. Pt was able to utilize rollator to to stretcher with v.c. pt required cuing to swollow due to congested voice. Current Level of Function Impacting Discharge (mobility/balance): min A     Other factors to consider for discharge: cognition, decrease mobility          PLAN :  Patient continues to benefit from skilled intervention to address the above impairments. Continue treatment per established plan of care. to address goals.     Recommendation for discharge: (in order for the patient to meet his/her long term goals)  HHPT with assistance if unable then SNF      This discharge recommendation:  Has not yet been discussed the attending provider and/or case management    IF patient discharges home will need the following DME: rollator       SUBJECTIVE:   Patient stated I want my purse.     OBJECTIVE DATA SUMMARY:   Critical Behavior:  Neurologic State: Alert  Orientation Level: Disoriented to time, Oriented to place, Oriented to situation, Oriented to person  Cognition: Appropriate decision making, Appropriate for age attention/concentration, Appropriate safety awareness, Follows commands  Safety/Judgement: Decreased insight into deficits  Functional Mobility Training:  Bed Mobility:     Supine to Sit: Stand-by assistance; Additional time              Transfers:  Sit to Stand: Contact guard assistance  Stand to Sit: Contact guard assistance        Bed to Chair: Minimum assistance (with rollator bed to stretcher )                    Balance:  Sitting: Intact  Standing: Impaired  Standing - Static: Fair  Standing - Dynamic : Fair  Ambulation/Gait Training:                                                        Stairs: Therapeutic Exercises:     Pain Rating:  No complaints     Activity Tolerance:   Limited     After treatment patient left in no apparent distress: With transport     COMMUNICATION/COLLABORATION:   The patients plan of care was discussed with: Registered nurse.      Summer Garcia PTA   Time Calculation: 19 mins

## 2021-11-19 NOTE — PROGRESS NOTES
[address] Adult  Hospitalist Group                                                                                          Hospitalist Progress Note  Chintan Waller MD  Answering service: [phone] OR [phone] from in house phone        Date of Service:  2021  NAME:  Genesis Mcgee  :  1935  MRN:  [id number]      Admission Summary:   Genesis Mcgee is a 81 y.o. female who presents with fatigue constipation and abnormal lab test      This is an 27-year-old female who presents with fatigue, weakness, constipation and some transient confusion.  She was seen yesterday by her primary physician is Dr. Magali Cristina from [facility] had some blood drawn for routine and Dr. Magali Cristina called the daughter today and told her that the calcium was extremely high and she needed to come to the hospital. Javier Holden has had no problems with chest pain, shortness of breath, nausea or vomiting and no other GI or  symptoms noted.  Patient is alert and oriented currently with no acute complaints. Per pt, noticed lost weight last several years \" previous pants and clothes all too large for her now\" . Denied dysphagia. Denied voice changes.  No history of cancer.  .       Interval history / Subjective:   F/u hypercalcemia   Having pooled secretions today, this is new. No other focal neurologic signs. Assessment & Plan:     Severe hypercalcemia - Primary hyperparathyroidism   VÍCTOR  - pt is taking thiazide diuretics at home, currently on hold  -Calcium now worsening today, which raises concern for possible PTH secreting tumor.   -Increased intact PTH, follow PTHrP  -renal US unremarkable  -Continue IVF   -Continue Sensipar  -Nephrology following   - Reconsult ENT    Right paratracheal mass  - Appreciate ENT,   - NM scan negative for adenoma, concern for PTH secreting tumor   - Cannot get CT with IV contrast due to VÍCTOR and CKD.    - Will ask surgery to re-eval today, especially in light of      Dysphagia with pooled secretions  - Speech consult   - NPO   - ENT to come back, and evaluate if related to paratracheal mass?  - CT head, may also need to consider MRI brain if ENT/Speech evaluates and does not think dysphagia is related to mass. Hypokalemia: replace as needed  Hyponatremia: monitor  Hypomagnesemia: replace as needed  Abnormal admission EKG. Echo EF >70%. Normal wall motion  HTN: hold thiazide , will increase the dose of norvasc     PT/OT SNF but the daughter would prefer to take the patient home    Code status: FULL CODE  DVT prophylaxis: Heparin  Spoke to Amy Zaragoza [phone] at patient's request    Care Plan discussed with: Patient/Family  Anticipated Disposition:  PT, OT, RN  Anticipated Discharge: Greater than 48 hours     Hospital Problems  Date Reviewed: 11/14/2021          Codes Class Noted POA    * (Principal) Hypercalcemia ICD-10-CM: M02.64  ICD-9-CM: 275.42  11/13/2021 Yes        Lung mass ICD-10-CM: R91.8  ICD-9-CM: 786.6  11/13/2021 Unknown                Review of Systems:   A comprehensive review of systems was negative except for that written in the HPI. Vital Signs:    Last 24hrs VS reviewed since prior progress note. Most recent are:  Visit Vitals  /72   Pulse 78   Temp 97.1 °F (36.2 °C)   Resp 17   Ht 5' 6\" (1.676 m)   Wt 49.9 kg (110 lb)   SpO2 99%   BMI 17.75 kg/m²         Intake/Output Summary (Last 24 hours) at 11/19/2021 1526  Last data filed at 11/18/2021 2038  Gross per 24 hour   Intake    Output 800 ml   Net -800 ml        Physical Examination:     I had a face to face encounter with this patient and independently examined them on 11/19/2021 as outlined below:          Constitutional:  No acute distress    ENT:  Oral mucosa moist, oropharynx benign. Node palpated over the L thyroid    Resp:  CTA bilaterally, transmitted upper airway sounds. No wheezing/rhonchi/rales.  No accessory muscle use   CV:  Regular rhythm, normal rate, no murmurs, gallops, rubs    GI: Soft, non distended, non tender. normoactive bowel sounds, no hepatosplenomegaly     Musculoskeletal:  No edema, warm, 2+ pulses throughout    Neurologic:  Moves all extremities. AAOx2 (self, place), CN II-XII reviewed            Data Review:    Review and/or order of clinical lab test  Review and/or order of tests in the radiology section of [facility]  Review and/or order of tests in the medicine section of [facility]      Labs:     Recent Labs     11/19/21 0452 11/18/21  0909   WBC 12.9* 7.8   HGB 11.1* 10.1*   HCT 34.1* 30.6*    187     Recent Labs     11/19/21 0452 11/18/21  1226 11/18/21  0909 11/17/21  0406 11/17/21  0406   *  --  136  --  135*   K 3.6  --  4.2  --  3.5     --  106  --  106   CO2 22  --  24  --  23   BUN 36*  --  35*  --  36*   CREA 1.89*  --  1.79*  --  1.74*   *  --  95  --  97   CA 15.1* 15.3* 14.8*   < > 13.9*   MG 1.4*  --  1.5*  --  2.0   PHOS 4.1  --  3.8  --  3.1    < > = values in this interval not displayed. Recent Labs     11/19/21 0452 11/18/21 0909 11/17/21  0406   ALT 24 21 15   AP 59 55 55   TBILI 0.4 0.3 0.3   TP 7.2 6.3* 6.9   ALB 3.0* 2.7* 2.8*   GLOB 4.2* 3.6 4.1*     No results for input(s): INR, PTP, APTT, INREXT, INREXT in the last 72 hours. No results for input(s): FE, TIBC, PSAT, FERR in the last 72 hours. No results found for: FOL, RBCF   No results for input(s): PH, PCO2, PO2 in the last 72 hours. No results for input(s): CPK, CKNDX, TROIQ in the last 72 hours.     No lab exists for component: CPKMB  No results found for: CHOL, CHOLX, CHLST, CHOLV, HDL, HDLP, LDL, LDLC, DLDLP, TGLX, TRIGL, TRIGP, CHHD, CHHDX  No results found for: GLUCPOC  No results found for: COLOR, APPRN, SPGRU, REFSG, KATY, PROTU, GLUCU, KETU, BILU, UROU, IRASEMA, LEUKU, GLUKE, EPSU, BACTU, WBCU, RBCU, CASTS, UCRY      Medications Reviewed:     Current Facility-Administered Medications   Medication Dose Route Frequency    magnesium sulfate 2 g/50 ml IVPB (premix or compounded)  2 g IntraVENous ONCE    cinacalcet (SENSIPAR) tablet 90 mg  90 mg Oral BID WITH MEALS    amLODIPine (NORVASC) tablet 10 mg  10 mg Oral DAILY    hydrALAZINE (APRESOLINE) 20 mg/mL injection 10 mg  10 mg IntraVENous Q6H PRN    rosuvastatin (CRESTOR) tablet 10 mg  10 mg Oral QHS    sodium chloride (NS) flush 5-40 mL  5-40 mL IntraVENous Q8H    sodium chloride (NS) flush 5-40 mL  5-40 mL IntraVENous PRN    acetaminophen (TYLENOL) tablet 650 mg  650 mg Oral Q6H PRN    Or    acetaminophen (TYLENOL) suppository 650 mg  650 mg Rectal Q6H PRN    polyethylene glycol (MIRALAX) packet 17 g  17 g Oral DAILY PRN    ondansetron (ZOFRAN ODT) tablet 4 mg  4 mg Oral Q8H PRN    Or    ondansetron (ZOFRAN) injection 4 mg  4 mg IntraVENous Q6H PRN    heparin (porcine) injection 5,000 Units  5,000 Units SubCUTAneous Q12H    0.9% sodium chloride infusion  125 mL/hr IntraVENous CONTINUOUS     ______________________________________________________________________  EXPECTED LENGTH OF STAY: 3d 12h  ACTUAL LENGTH OF STAY:          6                 Laura Sosa MD

## 2021-11-19 NOTE — PROGRESS NOTES
Problem: Dysphagia (Adult)  Goal: *Acute Goals and Plan of Care (Insert Text)  Description: Speech Pathology Goals    Initiated 11/19/2021  1. Patient will participate in imaging to objectively assess safety of the swallow. MET 11/19/2021  2. Patient will participate in ice chip and small sips of water trials with no s/s of aspiration bedside within 7 days. 3. Patient will participate in repeat imaging to objectively assess for improvements in swallow function within 7 days. 11/19/2021 1353 by ANGELINE Canales  Outcome: Progressing Towards Goal     SPEECH PATHOLOGY MODIFIED BARIUM SWALLOW STUDY  Patient: Vernon Lott (28 y.o. female)  Date: 11/19/2021  Primary Diagnosis: Hypercalcemia [E83.52]  Lung mass [R91.8]        Precautions:        ASSESSMENT :  Based on the objective data described below, the patient presents with moderate oral and severe pharyngeal dysphagia. Oral phase characterized by discoordination and weakness with poor bolus control resulting in premature spillage with thins and oral residue with all consistencies. Pharyngeal phase characterized by swallow delay, poor sensation, reduced strength and hyolaryngeal excursion with laryngeal vestibule gap. Premature spillage with thins resulted in aspiration before the swallow. Swallow delay paired with reduced closure resulted in penetration to the cords during the swallow that was subsequently aspirated after the swallow. Aspiration was silent. Cued coughs were effective in clearing some but not all of the aspirated material. Reduced strength and constriction resulted in severe diffuse pharyngeal residue. Residue was then repeatedly aspirated after the swallow. With purees, patient with penetration to the cords during the swallow and severe residue at the level of the airway after the swallow. Residue then flashed in and out of the airway before it was aspirated with inhalation. Cued cough was effective in clearing aspirated puree.  Mildly thick liquid was not effective in improving swallow delay, coordination, or in clearing residue and resulted in aspiration after the swallow. UES opening diminished significantly impeding bolus flow and contributing to piriform residue with all consistencies. Given severity of pharyngeal deficits with aspiration with all consistencies and severe residue,  do not recommend any PO at this time. Suspect pt will require alternate means for nutrition/hydration and medication. Unsure of etiology of dysphagia at this time. Suspect that paratracheal mass could be contributing factor. However, given rapid onset of severe swallow difficulty and poor secretion management paired with swallow discoordination appreciated on MBS concern for possible effect on recurrent laryngeal nerve impacting sensation vs neural involvement? Daughter bedside also reporting new speech changes she noticed today compared to yesterday. Recommend further workup into etiology of new deficits. Will follow to monitor swallow and plan to repeat imaging with any signs of improvement. Will also follow to align recommendations with plan of care. Provided education to patient's son and nephew bedside regarding results of MBS and NPO recommendation. They verbalized understanding with no further questions at this time. Patient will benefit from skilled intervention to address the above impairments. Patients rehabilitation potential is considered to be determined given unknown etiology of new onset dysphagia     PLAN :  Recommendations and Planned Interventions:  -- NPO with alternate means for nutrition/hydration and medication  -- ice chips okay following good oral care for comfort and to work against swallow muscle atrophy  -- Recommend further workup into etiology of new deficits. -- SLP to follow to monitor swallow function and align recommendations with plan of care.     Frequency/Duration: Patient will be followed by speech-language pathology 3 times a week to address goals. Discharge Recommendations: To Be Determined     SUBJECTIVE:   Patient stated okay, thank you. OBJECTIVE:   No past medical history on file. No past surgical history on file. Prior Level of Function/Home Situation:   Home Situation  Home Environment: Private residence  # Steps to Enter: 4  Rails to Enter: Yes  One/Two Story Residence: Two story  # of Interior Steps: 12  Living Alone: Yes  Support Systems: Other Family Member(s), Friend/Neighbor  Patient Expects to be Discharged toVF Cor[de-identified]ration  Current DME Used/Available at Home: Raised toilet seat, Walker, rolling  Diet prior to admission: regulaar/thin  Current Diet:  NPO   Radiologist:  (Dr. Lawyer Feng)  Film Views: Lateral; Fluoro  Patient Position: upright in housted chair    Trial 1: Trial 2:   Consistency Presented: Thin liquid Consistency Presented: Puree   How Presented: SLP-fed/presented; Straw How Presented: SLP-fed/presented; Spoon   Consistency Amount:  (10 cc) Consistency Amount:  (.5 tsp ba paste)   Bolus Acceptance: No impairment Bolus Acceptance: No impairment   Bolus Formation/Control: Impaired: Premature spillage; Poor Bolus Formation/Control: Impaired: Delayed; Poor; Other (comment) (poor bolus cohesion/control)   Propulsion: Discoordination Propulsion: Discoordination   Oral Residue: Lingual Oral Residue: Lingual   Initiation of Swallow: Triggered at pyriform sinus(es) Initiation of Swallow: Triggered at pyriform sinus(es)   Timing: Pooling 1-5 sec Timing: Pooling 1-5 sec   Penetration: During swallow; To cords Penetration: Before swallow; During swallow; To cords   Aspiration/Timing: Silent ; Repeated; Before; After Aspiration/Timing: After; From residual   Pharyngeal Clearance: Vallecular residue; Pyriform residue ; 10-50% Pharyngeal Clearance: Vallecular residue; Pyriform residue ; Greater than 50%   Attempted Modifications: Double swallow; Effortful swallow Attempted Modifications:  Alternate liquids/solids; Double swallow; Effortful swallow   Effective Modifications: None Effective Modifications: None   Cues for Modifications: Moderate Cues for Modifications: Maximum           Trial 3:   Consistency Presented: Nectar thick liquid   How Presented: SLP-fed/presented; Spoon   Consistency Amount:  (5 cc)   Bolus Acceptance: No impairment   Bolus Formation/Control: Impaired: Delayed; Incomplete; Premature spillage   Propulsion: Discoordination   Oral Residue: Lingual   Initiation of Swallow: Triggered at pyriform sinus(es)   Timing: Pooling 1-5 sec   Penetration: None   Aspiration/Timing: After; From residual   Pharyngeal Clearance: Vallecular residue; Pyriform residue ; 10-50%   Attempted Modifications: Double swallow; Effortful swallow   Effective Modifications: None   Cues for Modifications: Maximum         Decreased Tongue Base Retraction?: Yes  Laryngeal Elevation: Inadequate epiglottic inversion; Reduced excursion with laryngeal vestibule gap  Aspiration/Penetration Score: 8 (Aspiration-Contrast passes cords/glottis with no effort to eject, ie/silent aspiration)  Pharyngeal Symmetry: Not assessed  Pharyngeal-Esophageal Segment: Decreased relaxation of upper esophageal segment  Pharyngeal Dysfunction: Decreased strength; Decreased elevation/closure; Decreased pharyngeal wall constriction    Oral Phase Severity: Moderate  Pharyngeal Phase Severity: Severe  NOMS:   The NOMS functional outcome measure was used to quantify this patient's level of swallowing impairment. Based on the NOMS, the patient was determined to be at level 1 for swallow function         NOMS Swallowing Levels:  Level 1 (CN): NPO  Level 2 (CM): NPO but takes consistency in therapy  Level 3 (CL): Takes less than 50% of nutrition p.o. and continues with nonoral feedings; and/or safe with mod cues; and/or max diet restriction  Level 4 (CK):  Safe swallow but needs mod cues; and/or mod diet restriction; and/or still requires some nonoral feeding/supplements  Level 5 (CJ): Safe swallow with min diet restriction; and/or needs min cues  Level 6 (CI): Independent with p.o.; rare cues; usually self cues; may need to avoid some foods or needs extra time  Level 7 ([address] [place] [address]): Independent for all p.o.  SALOME. (2003). National Outcomes Measurement System (NOMS): Adult Speech-Language Pathology User's Guide. COMMUNICATION/EDUCATION:     The patients plan of care including findings from MBS, recommendations, planned interventions, and recommended diet changes were discussed with: Registered nurse. Patient/family have participated as able in goal setting and plan of care. Patient/family agree to work toward stated goals and plan of care.     Thank you for this referral.  Karla Jerome M.S. CF-SLP   Speech Language Pathologist     Time Calculation: 25 mins

## 2021-11-19 NOTE — PROGRESS NOTES
6 rings were removed in the presence of pt's daughter due to fingers becoming edematous. Pt's daughter placed rings into pt's purse.

## 2021-11-19 NOTE — PROGRESS NOTES
Bedside shift change report given to Aditi RN (oncoming nurse) by Masha Angulo RN (offgoing nurse). Report included the following information SBAR, Kardex and Cardiac Rhythm SR/SB.

## 2021-11-19 NOTE — PROGRESS NOTES
Occupational Therapy  11/19/2021    Chart reviewed and attempted to see pt for OT txt. Pt is currently off the floor. Will follow.  Margaret Mahajan MS, OTR/L

## 2021-11-19 NOTE — ROUTINE PROCESS
Bedside and Verbal shift change report given to April (oncoming nurse) by Huong Marcelo   (offgoing nurse). Report included the following information SBAR, Kardex, ED Summary, Intake/Output, MAR, Recent Results and Cardiac Rhythm NSR.

## 2021-11-19 NOTE — PROGRESS NOTES
CARLOS: Anticipate discharge home with home with HH PT/OT through [facility]. Transportation likely in car with daughter. RUR: 11%    Disposition: Patient admitted 11/13 for hypercalcemia and lung mass. Patient reported that she lives alone, but her daughters live within walking distance. Accepted for [facility] with [facility]. [employer] provided rollator requested by family. CM spoke with daughter, Shanika Millard, and informed her of the copay. CM informed daughter that Medicare will only cover one type of DME every five years. CM expressed that it might be better for patient to pay out of pocket for a rollator on [address] that is not much more than the current copay for rollator through Medicare. CM notified daughter that the family will have to return the rollator to [employer] if they decide to go that route. Daughter agreed to return the rollator if that is their decision. [employer] paperwork emailed to patient's daughter and placed on patient's chart.     Kvng Craig, [facility],[address]  [phone]

## 2021-11-19 NOTE — PROGRESS NOTES
NAME: Radha Castaneda        :  1935        MRN:  [id number]                     Assessment   :                                               Plan:  VÍCTOR  Hyponatremia, mild  Severe hypercalcemia  Constipation  Anorexia  Right paratracheal mass/goiter  Primary hyperparathyroidism  HTN  Hypokalemia Creatinine 2.3 to 1.7-1.8mg/dl and holding now; nml SHELLEY  Very high PTH (1720)- was c/w primary HPT. Serum Ca was improving with Sensipar until yesterday    Titrated up Sensipar to 90mg BID but Ca remains elevated. My suspicion is that this maybe a parathyroid carcinoma. Even if this confirmed not sure if she would be a surgical candidate? ENT called back    IV Magnesium sulfate 2gm x1 dose    Swallow eval    IV NS  Repeat iPTH    Discussed case with patient's daughter and Dr. Debra Jules           Subjective:     Chief Complaint:  Significant issues clearing secretions and swallowing which is a new issue for patient. Daughter at bedside. Review of Systems:    Symptom Y/N Comments  Symptom Y/N Comments   Fever/Chills    Chest Pain     Poor Appetite    Edema     Cough    Abdominal Pain     Sputum    Joint Pain     SOB/ARIZA    Pruritis/Rash     Nausea/vomit    Tolerating PT/OT     Diarrhea    Tolerating Diet     Constipation    Other       Could not obtain due to:      Objective:     VITALS:   Last 24hrs VS reviewed since prior progress note.  Most recent are:  Visit Vitals  BP (!) 164/76   Pulse 81   Temp 97.7 °F (36.5 °C)   Resp 17   Ht 5' 6\" (1.676 m)   Wt 49.9 kg (110 lb)   SpO2 98%   BMI 17.75 kg/m²       Intake/Output Summary (Last 24 hours) at 2021 1700  Last data filed at 2021  Gross per 24 hour   Intake    Output 800 ml   Net -800 ml      Telemetry Reviewed:     PHYSICAL EXAM:  General: NAD  No edema  cta arpan    Lab Data Reviewed: (see below)    Medications Reviewed: (see below)    PMH/SH reviewed - no change compared to H&P  ________________________________________________________________________  Care Plan discussed with:  Patient Y    Family  Y    RN     Care Manager                    Consultant:          Comments   >50% of visit spent in counseling and coordination of care       ________________________________________________________________________  Levy Villanueva MD     Procedures: see electronic medical records for all procedures/Xrays and details which  were not copied into this note but were reviewed prior to creation of Plan. LABS:  Recent Labs     11/19/21 0452 11/18/21  0909   WBC 12.9* 7.8   HGB 11.1* 10.1*   HCT 34.1* 30.6*    187     Recent Labs     11/19/21 0452 11/18/21  1226 11/18/21  0909 11/17/21  0406 11/17/21  0406   *  --  136  --  135*   K 3.6  --  4.2  --  3.5     --  106  --  106   CO2 22  --  24  --  23   BUN 36*  --  35*  --  36*   CREA 1.89*  --  1.79*  --  1.74*   *  --  95  --  97   CA 15.1* 15.3* 14.8*   < > 13.9*   MG 1.4*  --  1.5*  --  2.0   PHOS 4.1  --  3.8  --  3.1    < > = values in this interval not displayed. Recent Labs     11/19/21 0452 11/18/21  0909 11/17/21  0406   AP 59 55 55   TP 7.2 6.3* 6.9   ALB 3.0* 2.7* 2.8*   GLOB 4.2* 3.6 4.1*     No results for input(s): INR, PTP, APTT, INREXT, INREXT in the last 72 hours. No results for input(s): FE, TIBC, PSAT, FERR in the last 72 hours. No results found for: FOL, RBCF   No results for input(s): PH, PCO2, PO2 in the last 72 hours. No results for input(s): CPK, CKMB in the last 72 hours.     No lab exists for component: TROPONINI  No components found for: GLPOC  No results found for: COLOR, APPRN, SPGRU, REFSG, KATY, PROTU, GLUCU, KETU, BILU, UROU, IRASEMA, LEUKU, GLUKE, EPSU, BACTU, WBCU, RBCU, CASTS, UCRY    MEDICATIONS:  Current Facility-Administered Medications   Medication Dose Route Frequency    magnesium sulfate 2 g/50 ml IVPB (premix or compounded)  2 g IntraVENous ONCE    cinacalcet (SENSIPAR) tablet 90 mg  90 mg Oral BID WITH MEALS    amLODIPine (NORVASC) tablet 10 mg  10 mg Oral DAILY    hydrALAZINE (APRESOLINE) 20 mg/mL injection 10 mg  10 mg IntraVENous Q6H PRN    rosuvastatin (CRESTOR) tablet 10 mg  10 mg Oral QHS    sodium chloride (NS) flush 5-40 mL  5-40 mL IntraVENous Q8H    sodium chloride (NS) flush 5-40 mL  5-40 mL IntraVENous PRN    acetaminophen (TYLENOL) tablet 650 mg  650 mg Oral Q6H PRN    Or    acetaminophen (TYLENOL) suppository 650 mg  650 mg Rectal Q6H PRN    polyethylene glycol (MIRALAX) packet 17 g  17 g Oral DAILY PRN    ondansetron (ZOFRAN ODT) tablet 4 mg  4 mg Oral Q8H PRN    Or    ondansetron (ZOFRAN) injection 4 mg  4 mg IntraVENous Q6H PRN    heparin (porcine) injection 5,000 Units  5,000 Units SubCUTAneous Q12H    0.9% sodium chloride infusion  125 mL/hr IntraVENous CONTINUOUS

## 2021-11-20 ENCOUNTER — APPOINTMENT (OUTPATIENT)
Dept: VASCULAR SURGERY | Age: 86
DRG: 643 | End: 2021-11-20
Attending: INTERNAL MEDICINE
Payer: MEDICARE

## 2021-11-20 ENCOUNTER — ANESTHESIA EVENT (OUTPATIENT)
Dept: SURGERY | Age: 86
DRG: 643 | End: 2021-11-20
Payer: MEDICARE

## 2021-11-20 ENCOUNTER — APPOINTMENT (OUTPATIENT)
Dept: GENERAL RADIOLOGY | Age: 86
DRG: 643 | End: 2021-11-20
Attending: INTERNAL MEDICINE
Payer: MEDICARE

## 2021-11-20 ENCOUNTER — APPOINTMENT (OUTPATIENT)
Dept: MRI IMAGING | Age: 86
DRG: 643 | End: 2021-11-20
Attending: INTERNAL MEDICINE
Payer: MEDICARE

## 2021-11-20 ENCOUNTER — ANESTHESIA (OUTPATIENT)
Dept: SURGERY | Age: 86
DRG: 643 | End: 2021-11-20
Payer: MEDICARE

## 2021-11-20 ENCOUNTER — APPOINTMENT (OUTPATIENT)
Dept: CT IMAGING | Age: 86
DRG: 643 | End: 2021-11-20
Attending: NURSE PRACTITIONER
Payer: MEDICARE

## 2021-11-20 PROBLEM — E07.9 THYROID MASS: Status: ACTIVE | Noted: 2021-11-20

## 2021-11-20 PROBLEM — J96.91 RESPIRATORY FAILURE WITH HYPOXIA (HCC): Status: ACTIVE | Noted: 2021-11-20

## 2021-11-20 LAB
ALBUMIN SERPL-MCNC: 2.4 G/DL (ref 3.5–5)
ALBUMIN SERPL-MCNC: 2.8 G/DL (ref 3.5–5)
ALBUMIN/GLOB SERPL: 0.6 {RATIO} (ref 1.1–2.2)
ALBUMIN/GLOB SERPL: 0.7 {RATIO} (ref 1.1–2.2)
ALP SERPL-CCNC: 51 U/L (ref 45–117)
ALP SERPL-CCNC: 54 U/L (ref 45–117)
ALT SERPL-CCNC: 26 U/L (ref 12–78)
ALT SERPL-CCNC: 26 U/L (ref 12–78)
ANION GAP SERPL CALC-SCNC: 7 MMOL/L (ref 5–15)
ANION GAP SERPL CALC-SCNC: 8 MMOL/L (ref 5–15)
ARTERIAL PATENCY WRIST A: POSITIVE
ARTERIAL PATENCY WRIST A: POSITIVE
AST SERPL-CCNC: 26 U/L (ref 15–37)
AST SERPL-CCNC: 32 U/L (ref 15–37)
BASE DEFICIT BLD-SCNC: 4 MMOL/L
BASE DEFICIT BLD-SCNC: 4.3 MMOL/L
BASOPHILS # BLD: 0 K/UL (ref 0–0.1)
BASOPHILS # BLD: 0 K/UL (ref 0–0.1)
BASOPHILS NFR BLD: 0 % (ref 0–1)
BASOPHILS NFR BLD: 0 % (ref 0–1)
BDY SITE: ABNORMAL
BDY SITE: ABNORMAL
BILIRUB SERPL-MCNC: 0.3 MG/DL (ref 0.2–1)
BILIRUB SERPL-MCNC: 0.4 MG/DL (ref 0.2–1)
BUN SERPL-MCNC: 40 MG/DL (ref 6–20)
BUN SERPL-MCNC: 46 MG/DL (ref 6–20)
BUN/CREAT SERPL: 21 (ref 12–20)
BUN/CREAT SERPL: 21 (ref 12–20)
CALCIUM SERPL-MCNC: 11.9 MG/DL (ref 8.5–10.1)
CALCIUM SERPL-MCNC: 12.9 MG/DL (ref 8.5–10.1)
CALCIUM SERPL-MCNC: 13 MG/DL (ref 8.5–10.1)
CHLORIDE SERPL-SCNC: 104 MMOL/L (ref 97–108)
CHLORIDE SERPL-SCNC: 110 MMOL/L (ref 97–108)
CO2 SERPL-SCNC: 21 MMOL/L (ref 21–32)
CO2 SERPL-SCNC: 22 MMOL/L (ref 21–32)
COVID-19 RAPID TEST, COVR: NOT DETECTED
CREAT SERPL-MCNC: 1.93 MG/DL (ref 0.55–1.02)
CREAT SERPL-MCNC: 2.23 MG/DL (ref 0.55–1.02)
DIFFERENTIAL METHOD BLD: ABNORMAL
DIFFERENTIAL METHOD BLD: ABNORMAL
EOSINOPHIL # BLD: 0 K/UL (ref 0–0.4)
EOSINOPHIL # BLD: 0 K/UL (ref 0–0.4)
EOSINOPHIL NFR BLD: 0 % (ref 0–7)
EOSINOPHIL NFR BLD: 0 % (ref 0–7)
ERYTHROCYTE [DISTWIDTH] IN BLOOD BY AUTOMATED COUNT: 12.2 % (ref 11.5–14.5)
ERYTHROCYTE [DISTWIDTH] IN BLOOD BY AUTOMATED COUNT: 12.3 % (ref 11.5–14.5)
GAS FLOW.O2 O2 DELIVERY SYS: ABNORMAL L/MIN
GAS FLOW.O2 O2 DELIVERY SYS: ABNORMAL L/MIN
GAS FLOW.O2 SETTING OXYMISER: 12 BPM
GLOBULIN SER CALC-MCNC: 3.8 G/DL (ref 2–4)
GLOBULIN SER CALC-MCNC: 4 G/DL (ref 2–4)
GLUCOSE BLD STRIP.AUTO-MCNC: 197 MG/DL (ref 65–117)
GLUCOSE BLD STRIP.AUTO-MCNC: 63 MG/DL (ref 65–117)
GLUCOSE BLD STRIP.AUTO-MCNC: 95 MG/DL (ref 65–117)
GLUCOSE SERPL-MCNC: 71 MG/DL (ref 65–100)
GLUCOSE SERPL-MCNC: 97 MG/DL (ref 65–100)
HCO3 BLD-SCNC: 20.2 MMOL/L (ref 22–26)
HCO3 BLD-SCNC: 20.9 MMOL/L (ref 22–26)
HCT VFR BLD AUTO: 28.5 % (ref 35–47)
HCT VFR BLD AUTO: 29.2 % (ref 35–47)
HGB BLD-MCNC: 9.3 G/DL (ref 11.5–16)
HGB BLD-MCNC: 9.5 G/DL (ref 11.5–16)
IMM GRANULOCYTES # BLD AUTO: 0 K/UL (ref 0–0.04)
IMM GRANULOCYTES # BLD AUTO: 0 K/UL (ref 0–0.04)
IMM GRANULOCYTES NFR BLD AUTO: 0 % (ref 0–0.5)
IMM GRANULOCYTES NFR BLD AUTO: 0 % (ref 0–0.5)
LACTATE SERPL-SCNC: 0.9 MMOL/L (ref 0.4–2)
LYMPHOCYTES # BLD: 0.4 K/UL (ref 0.8–3.5)
LYMPHOCYTES # BLD: 0.7 K/UL (ref 0.8–3.5)
LYMPHOCYTES NFR BLD: 3 % (ref 12–49)
LYMPHOCYTES NFR BLD: 6 % (ref 12–49)
MAGNESIUM SERPL-MCNC: 1.9 MG/DL (ref 1.6–2.4)
MCH RBC QN AUTO: 28.9 PG (ref 26–34)
MCH RBC QN AUTO: 29.1 PG (ref 26–34)
MCHC RBC AUTO-ENTMCNC: 32.5 G/DL (ref 30–36.5)
MCHC RBC AUTO-ENTMCNC: 32.6 G/DL (ref 30–36.5)
MCV RBC AUTO: 88.5 FL (ref 80–99)
MCV RBC AUTO: 89.6 FL (ref 80–99)
MONOCYTES # BLD: 0.7 K/UL (ref 0–1)
MONOCYTES # BLD: 1.7 K/UL (ref 0–1)
MONOCYTES NFR BLD: 14 % (ref 5–13)
MONOCYTES NFR BLD: 6 % (ref 5–13)
NEUTS SEG # BLD: 10.6 K/UL (ref 1.8–8)
NEUTS SEG # BLD: 9.8 K/UL (ref 1.8–8)
NEUTS SEG NFR BLD: 80 % (ref 32–75)
NEUTS SEG NFR BLD: 91 % (ref 32–75)
NRBC # BLD: 0 K/UL (ref 0–0.01)
NRBC # BLD: 0 K/UL (ref 0–0.01)
NRBC BLD-RTO: 0 PER 100 WBC
NRBC BLD-RTO: 0 PER 100 WBC
O2/TOTAL GAS SETTING VFR VENT: 55 %
PAW @ MEAN EXP FLOW ON VENT: 7.4 CMH2O
PCO2 BLD: 33 MMHG (ref 35–45)
PCO2 BLD: 36.9 MMHG (ref 35–45)
PEEP RESPIRATORY: 5 CMH2O
PH BLD: 7.36 [PH] (ref 7.35–7.45)
PH BLD: 7.4 [PH] (ref 7.35–7.45)
PHOSPHATE SERPL-MCNC: 3.2 MG/DL (ref 2.6–4.7)
PLATELET # BLD AUTO: 212 K/UL (ref 150–400)
PLATELET # BLD AUTO: 213 K/UL (ref 150–400)
PLATELET COMMENTS,PCOM: ABNORMAL
PMV BLD AUTO: 11.1 FL (ref 8.9–12.9)
PMV BLD AUTO: 11.9 FL (ref 8.9–12.9)
PO2 BLD: 101 MMHG (ref 80–100)
PO2 BLD: 81 MMHG (ref 80–100)
POTASSIUM SERPL-SCNC: 3.2 MMOL/L (ref 3.5–5.1)
POTASSIUM SERPL-SCNC: 3.6 MMOL/L (ref 3.5–5.1)
PROCALCITONIN SERPL-MCNC: 0.8 NG/ML
PROT SERPL-MCNC: 6.2 G/DL (ref 6.4–8.2)
PROT SERPL-MCNC: 6.8 G/DL (ref 6.4–8.2)
PTH RELATED PROT SERPL-SCNC: <2 PMOL/L
PTH-INTACT SERPL-MCNC: 115.1 PG/ML (ref 18.4–88)
RBC # BLD AUTO: 3.22 M/UL (ref 3.8–5.2)
RBC # BLD AUTO: 3.26 M/UL (ref 3.8–5.2)
RBC MORPH BLD: ABNORMAL
SAO2 % BLD: 95.6 % (ref 92–97)
SAO2 % BLD: 97.9 % (ref 92–97)
SERVICE CMNT-IMP: ABNORMAL
SERVICE CMNT-IMP: ABNORMAL
SERVICE CMNT-IMP: NORMAL
SODIUM SERPL-SCNC: 134 MMOL/L (ref 136–145)
SODIUM SERPL-SCNC: 138 MMOL/L (ref 136–145)
SOURCE, COVRS: NORMAL
SPECIMEN TYPE: ABNORMAL
SPECIMEN TYPE: ABNORMAL
VENTILATION MODE VENT: ABNORMAL
VT SETTING VENT: 400 ML
WBC # BLD AUTO: 11.7 K/UL (ref 3.6–11)
WBC # BLD AUTO: 12.2 K/UL (ref 3.6–11)

## 2021-11-20 PROCEDURE — 65620000000 HC RM CCU GENERAL

## 2021-11-20 PROCEDURE — 74011000258 HC RX REV CODE- 258: Performed by: INTERNAL MEDICINE

## 2021-11-20 PROCEDURE — 74011250636 HC RX REV CODE- 250/636: Performed by: HOSPITALIST

## 2021-11-20 PROCEDURE — 93971 EXTREMITY STUDY: CPT

## 2021-11-20 PROCEDURE — 5A1955Z RESPIRATORY VENTILATION, GREATER THAN 96 CONSECUTIVE HOURS: ICD-10-PCS | Performed by: ANESTHESIOLOGY

## 2021-11-20 PROCEDURE — 70450 CT HEAD/BRAIN W/O DYE: CPT

## 2021-11-20 PROCEDURE — 74011250636 HC RX REV CODE- 250/636: Performed by: NURSE ANESTHETIST, CERTIFIED REGISTERED

## 2021-11-20 PROCEDURE — 84145 PROCALCITONIN (PCT): CPT

## 2021-11-20 PROCEDURE — 74011250636 HC RX REV CODE- 250/636: Performed by: INTERNAL MEDICINE

## 2021-11-20 PROCEDURE — 71045 X-RAY EXAM CHEST 1 VIEW: CPT

## 2021-11-20 PROCEDURE — 76010000149 HC OR TIME 1 TO 1.5 HR: Performed by: ANESTHESIOLOGY

## 2021-11-20 PROCEDURE — 83605 ASSAY OF LACTIC ACID: CPT

## 2021-11-20 PROCEDURE — 77010033711 HC HIGH FLOW OXYGEN

## 2021-11-20 PROCEDURE — 86901 BLOOD TYPING SEROLOGIC RH(D): CPT

## 2021-11-20 PROCEDURE — 70551 MRI BRAIN STEM W/O DYE: CPT

## 2021-11-20 PROCEDURE — 36415 COLL VENOUS BLD VENIPUNCTURE: CPT

## 2021-11-20 PROCEDURE — 74011000250 HC RX REV CODE- 250: Performed by: INTERNAL MEDICINE

## 2021-11-20 PROCEDURE — P9045 ALBUMIN (HUMAN), 5%, 250 ML: HCPCS | Performed by: INTERNAL MEDICINE

## 2021-11-20 PROCEDURE — 74011250637 HC RX REV CODE- 250/637: Performed by: INTERNAL MEDICINE

## 2021-11-20 PROCEDURE — 77030008684 HC TU ET CUF COVD -B: Performed by: ANESTHESIOLOGY

## 2021-11-20 PROCEDURE — 94002 VENT MGMT INPAT INIT DAY: CPT

## 2021-11-20 PROCEDURE — 83735 ASSAY OF MAGNESIUM: CPT

## 2021-11-20 PROCEDURE — 77030005513 HC CATH URETH FOL11 MDII -B

## 2021-11-20 PROCEDURE — 36600 WITHDRAWAL OF ARTERIAL BLOOD: CPT

## 2021-11-20 PROCEDURE — 0BH17EZ INSERTION OF ENDOTRACHEAL AIRWAY INTO TRACHEA, VIA NATURAL OR ARTIFICIAL OPENING: ICD-10-PCS | Performed by: ANESTHESIOLOGY

## 2021-11-20 PROCEDURE — 82803 BLOOD GASES ANY COMBINATION: CPT

## 2021-11-20 PROCEDURE — 74011250636 HC RX REV CODE- 250/636: Performed by: NURSE PRACTITIONER

## 2021-11-20 PROCEDURE — 87635 SARS-COV-2 COVID-19 AMP PRB: CPT

## 2021-11-20 PROCEDURE — 86923 COMPATIBILITY TEST ELECTRIC: CPT

## 2021-11-20 PROCEDURE — 70540 MRI ORBIT/FACE/NECK W/O DYE: CPT

## 2021-11-20 PROCEDURE — 74011000250 HC RX REV CODE- 250: Performed by: NURSE ANESTHETIST, CERTIFIED REGISTERED

## 2021-11-20 PROCEDURE — 76060000032 HC ANESTHESIA 0.5 TO 1 HR: Performed by: ANESTHESIOLOGY

## 2021-11-20 PROCEDURE — 82962 GLUCOSE BLOOD TEST: CPT

## 2021-11-20 PROCEDURE — 80053 COMPREHEN METABOLIC PANEL: CPT

## 2021-11-20 PROCEDURE — 85025 COMPLETE CBC W/AUTO DIFF WBC: CPT

## 2021-11-20 PROCEDURE — 83970 ASSAY OF PARATHORMONE: CPT

## 2021-11-20 PROCEDURE — 84100 ASSAY OF PHOSPHORUS: CPT

## 2021-11-20 RX ORDER — FENTANYL CITRATE 50 UG/ML
25 INJECTION, SOLUTION INTRAMUSCULAR; INTRAVENOUS ONCE
Status: DISPENSED | OUTPATIENT
Start: 2021-11-20 | End: 2021-11-21

## 2021-11-20 RX ORDER — ASPIRIN 300 MG/1
150 SUPPOSITORY RECTAL DAILY
Status: DISCONTINUED | OUTPATIENT
Start: 2021-11-21 | End: 2021-11-20

## 2021-11-20 RX ORDER — MIDAZOLAM HYDROCHLORIDE 1 MG/ML
INJECTION, SOLUTION INTRAMUSCULAR; INTRAVENOUS AS NEEDED
Status: DISCONTINUED | OUTPATIENT
Start: 2021-11-20 | End: 2021-11-20 | Stop reason: HOSPADM

## 2021-11-20 RX ORDER — GLYCOPYRROLATE 0.2 MG/ML
INJECTION INTRAMUSCULAR; INTRAVENOUS AS NEEDED
Status: DISCONTINUED | OUTPATIENT
Start: 2021-11-20 | End: 2021-11-20 | Stop reason: HOSPADM

## 2021-11-20 RX ORDER — DEXTROSE MONOHYDRATE AND SODIUM CHLORIDE 5; .9 G/100ML; G/100ML
100 INJECTION, SOLUTION INTRAVENOUS CONTINUOUS
Status: DISCONTINUED | OUTPATIENT
Start: 2021-11-20 | End: 2021-11-21

## 2021-11-20 RX ORDER — ASPIRIN 300 MG/1
300 SUPPOSITORY RECTAL DAILY
Status: DISCONTINUED | OUTPATIENT
Start: 2021-11-20 | End: 2021-11-20

## 2021-11-20 RX ORDER — DEXTROSE 50 % IN WATER (D50W) INTRAVENOUS SYRINGE
25 AS NEEDED
Status: DISCONTINUED | OUTPATIENT
Start: 2021-11-20 | End: 2021-11-25 | Stop reason: HOSPADM

## 2021-11-20 RX ORDER — GLYCOPYRROLATE 0.2 MG/ML
0.2 INJECTION INTRAMUSCULAR; INTRAVENOUS ONCE
Status: DISPENSED | OUTPATIENT
Start: 2021-11-20 | End: 2021-11-21

## 2021-11-20 RX ORDER — SODIUM CHLORIDE, SODIUM LACTATE, POTASSIUM CHLORIDE, CALCIUM CHLORIDE 600; 310; 30; 20 MG/100ML; MG/100ML; MG/100ML; MG/100ML
INJECTION, SOLUTION INTRAVENOUS
Status: DISCONTINUED | OUTPATIENT
Start: 2021-11-20 | End: 2021-11-20 | Stop reason: HOSPADM

## 2021-11-20 RX ORDER — MIDAZOLAM IN 0.9 % SOD.CHLORID 1 MG/ML
0-10 PLASTIC BAG, INJECTION (ML) INTRAVENOUS
Status: DISCONTINUED | OUTPATIENT
Start: 2021-11-20 | End: 2021-11-25 | Stop reason: HOSPADM

## 2021-11-20 RX ORDER — LIDOCAINE HYDROCHLORIDE 40 MG/ML
SOLUTION TOPICAL ONCE
Status: DISPENSED | OUTPATIENT
Start: 2021-11-20 | End: 2021-11-21

## 2021-11-20 RX ORDER — PROPOFOL 10 MG/ML
0-50 VIAL (ML) INTRAVENOUS
Status: DISCONTINUED | OUTPATIENT
Start: 2021-11-20 | End: 2021-11-25 | Stop reason: HOSPADM

## 2021-11-20 RX ORDER — ALBUMIN HUMAN 50 G/1000ML
25 SOLUTION INTRAVENOUS ONCE
Status: COMPLETED | OUTPATIENT
Start: 2021-11-21 | End: 2021-11-21

## 2021-11-20 RX ORDER — DEXMEDETOMIDINE HYDROCHLORIDE 100 UG/ML
INJECTION, SOLUTION INTRAVENOUS AS NEEDED
Status: DISCONTINUED | OUTPATIENT
Start: 2021-11-20 | End: 2021-11-20 | Stop reason: HOSPADM

## 2021-11-20 RX ORDER — GUAIFENESIN 100 MG/5ML
81 LIQUID (ML) ORAL DAILY
Status: DISCONTINUED | OUTPATIENT
Start: 2021-11-20 | End: 2021-11-20

## 2021-11-20 RX ORDER — ALBUMIN HUMAN 50 G/1000ML
25 SOLUTION INTRAVENOUS ONCE
Status: COMPLETED | OUTPATIENT
Start: 2021-11-20 | End: 2021-11-21

## 2021-11-20 RX ORDER — ALBUMIN HUMAN 50 G/1000ML
SOLUTION INTRAVENOUS
Status: COMPLETED
Start: 2021-11-20 | End: 2021-11-21

## 2021-11-20 RX ORDER — POTASSIUM CHLORIDE 7.45 MG/ML
10 INJECTION INTRAVENOUS
Status: COMPLETED | OUTPATIENT
Start: 2021-11-20 | End: 2021-11-20

## 2021-11-20 RX ORDER — LIDOCAINE HYDROCHLORIDE 40 MG/ML
INJECTION, SOLUTION RETROBULBAR; TOPICAL AS NEEDED
Status: DISCONTINUED | OUTPATIENT
Start: 2021-11-20 | End: 2021-11-20 | Stop reason: HOSPADM

## 2021-11-20 RX ADMIN — SODIUM CHLORIDE 125 ML/HR: 900 INJECTION, SOLUTION INTRAVENOUS at 07:26

## 2021-11-20 RX ADMIN — Medication 10 ML: at 05:02

## 2021-11-20 RX ADMIN — POTASSIUM CHLORIDE 10 MEQ: 7.46 INJECTION, SOLUTION INTRAVENOUS at 08:42

## 2021-11-20 RX ADMIN — GLYCOPYRROLATE 0.2 MG: 0.2 INJECTION, SOLUTION INTRAMUSCULAR; INTRAVENOUS at 19:15

## 2021-11-20 RX ADMIN — MIDAZOLAM 2 MG/HR: 5 INJECTION INTRAMUSCULAR; INTRAVENOUS at 22:56

## 2021-11-20 RX ADMIN — POTASSIUM CHLORIDE 10 MEQ: 7.46 INJECTION, SOLUTION INTRAVENOUS at 07:12

## 2021-11-20 RX ADMIN — DEXTROSE AND SODIUM CHLORIDE 100 ML/HR: 5; 900 INJECTION, SOLUTION INTRAVENOUS at 12:00

## 2021-11-20 RX ADMIN — FENTANYL CITRATE 50 MCG/HR: 50 INJECTION INTRAVENOUS at 22:03

## 2021-11-20 RX ADMIN — PROPOFOL 25 MCG/KG/MIN: 10 INJECTION, EMULSION INTRAVENOUS at 20:00

## 2021-11-20 RX ADMIN — SODIUM CHLORIDE 1.5 G: 900 INJECTION INTRAVENOUS at 18:00

## 2021-11-20 RX ADMIN — LIDOCAINE HYDROCHLORIDE 60 MG: 40 INJECTION, SOLUTION RETROBULBAR; TOPICAL at 19:15

## 2021-11-20 RX ADMIN — ASPIRIN 300 MG: 300 SUPPOSITORY RECTAL at 12:00

## 2021-11-20 RX ADMIN — DEXMEDETOMIDINE HYDROCHLORIDE 10 MCG: 100 INJECTION, SOLUTION, CONCENTRATE INTRAVENOUS at 19:17

## 2021-11-20 RX ADMIN — ALBUMIN (HUMAN) 25 G: 12.5 INJECTION, SOLUTION INTRAVENOUS at 21:19

## 2021-11-20 RX ADMIN — GLYCOPYRROLATE 0.2 MG: 0.2 INJECTION, SOLUTION INTRAMUSCULAR; INTRAVENOUS at 19:25

## 2021-11-20 RX ADMIN — DEXMEDETOMIDINE HYDROCHLORIDE 10 MCG: 100 INJECTION, SOLUTION, CONCENTRATE INTRAVENOUS at 19:31

## 2021-11-20 RX ADMIN — MIDAZOLAM 1 MG: 1 INJECTION INTRAMUSCULAR; INTRAVENOUS at 19:31

## 2021-11-20 RX ADMIN — DEXTROSE MONOHYDRATE 25 G: 25 INJECTION, SOLUTION INTRAVENOUS at 12:00

## 2021-11-20 RX ADMIN — DEXMEDETOMIDINE HYDROCHLORIDE 10 MCG: 100 INJECTION, SOLUTION, CONCENTRATE INTRAVENOUS at 19:34

## 2021-11-20 RX ADMIN — SODIUM CHLORIDE, POTASSIUM CHLORIDE, SODIUM LACTATE AND CALCIUM CHLORIDE: 600; 310; 30; 20 INJECTION, SOLUTION INTRAVENOUS at 19:15

## 2021-11-20 RX ADMIN — HEPARIN SODIUM 5000 UNITS: 5000 INJECTION INTRAVENOUS; SUBCUTANEOUS at 05:02

## 2021-11-20 RX ADMIN — PHENYLEPHRINE HYDROCHLORIDE 15 MCG/MIN: 10 INJECTION INTRAVENOUS at 23:04

## 2021-11-20 RX ADMIN — DEXMEDETOMIDINE HYDROCHLORIDE 10 MCG: 100 INJECTION, SOLUTION, CONCENTRATE INTRAVENOUS at 19:25

## 2021-11-20 NOTE — PROGRESS NOTES
[address] [facility] Adult  Hospitalist Group                                                                                          Hospitalist Progress Note  Ness Reddy MD  Answering service: [phone] OR [phone] from in house phone        Date of Service:  2021  NAME:  Sloan Vilchis  :  1935  MRN:  [id number]      Admission Summary:   Sloan Vilchis is a 81 y.o. female who presents with fatigue constipation and abnormal lab test      This is an 71-year-old female who presents with fatigue, weakness, constipation and some transient confusion.  She was seen yesterday by her primary physician is Dr. Flaca Cavazos from [facility] had some blood drawn for routine and Dr. Flaca Cavazos called the daughter today and told her that the calcium was extremely high and she needed to come to the hospital. Crystal Pérez has had no problems with chest pain, shortness of breath, nausea or vomiting and no other GI or  symptoms noted.  Patient is alert and oriented currently with no acute complaints. Per pt, noticed lost weight last several years \" previous pants and clothes all too large for her now\" . Denied dysphagia. Denied voice changes.  No history of cancer.  .       Interval history / Subjective:   F/u hypercalcemia   Dysphagia ongoing, noted yesterday. Hypoxic this am, improved with deep suctioning. Ca improved some, PTH improved. Awaiting MRI brain and neck today     Patient denies SOB. She has a very weak cough. She does acknowledge she has been having difficulty swallowing. Assessment & Plan:     Severe hypercalcemia - Primary hyperparathyroidism   VÍCTOR  - pt is taking thiazide diuretics at home, currently on hold  -Calcium now worsening today, which raises concern for possible PTH secreting tumor.   -Increased intact PTH, follow PTHrP, thyroglobulin ab neg  -renal US unremarkable  -Continue IVF --> switch to [place]  -Nephrology following   - ENT consult appreciated.  Will await MRI neck to determine next steps     Right paratracheal mass  - Appreciate ENT,   - NM scan negative for adenoma, concern for PTH secreting tumor   - Cannot get CT with IV contrast due to VÍCTOR and CKD. - MRI neck with contrast pending     Acute hypoxic respiratory failure - suspect from aspiration. Now on midflow. Dysphagia possible dysarthria with pooled secretions  - Speech consulted, silent aspiration with all consistencies.   - NPO  - ENT re-evaluated 11/20, notes this is unlikely secondary to thyroid mass. 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 If positive will need neuro consult. - CXR stat was clear, continue aggressive suctioning.   - O2 as needed for saturations over 90%  - Check procalcitonin, lactic. Low threshold to add abx to cover for aspiration     Hypokalemia: replace as needed  Hyponatremia: monitor  Hypomagnesemia: replace as needed  Abnormal admission EKG. Echo EF >70%. Normal wall motion  HTN: hold thiazide , continue norvasc for now     PT/OT SNF but the daughter would prefer to take the patient home    Code status: FULL CODE  DVT prophylaxis: Heparin  Spoke to Dori [phone] at patient's request    Care Plan discussed with: Patient/Family  Anticipated Disposition:  PT, OT, RN  Anticipated Discharge: Greater than 48 hours     Hospital Problems  Date Reviewed: 11/14/2021          Codes Class Noted POA    Thyroid mass ICD-10-CM: E07.9  ICD-9-CM: 246.9  11/20/2021 Unknown        Respiratory failure with hypoxia [facility]) ICD-10-CM: J96.91  ICD-9-CM: 518.81  11/20/2021 Unknown        * (Principal) Hypercalcemia ICD-10-CM: F18.33  ICD-9-CM: 275.42  11/13/2021 Yes                Review of Systems:   A comprehensive review of systems was negative except for that written in the HPI. Vital Signs:    Last 24hrs VS reviewed since prior progress note.  Most recent are:  Visit Vitals  /70 (BP 1 Location: Left upper arm, BP Patient Position: At rest)   Pulse 82   Temp 97.6 °F (36.4 °C)   Resp 18   Ht 5' 6\" (1.676 m)   Wt 57.3 kg (126 lb 6.4 oz)   SpO2 91%   BMI 20.40 kg/m²         Intake/Output Summary (Last 24 hours) at 11/20/2021 1406  Last data filed at 11/20/2021 9339  Gross per 24 hour   Intake 1000 ml   Output 900 ml   Net 100 ml        Physical Examination:     I had a face to face encounter with this patient and independently examined them on 11/20/2021 as outlined below:          Constitutional:  Moderate respiratory distress   ENT:  Oral mucosa moist, + pooled secretions, dysphagia, dysarthria. Node palpated over the L thyroid    Resp:  CTA bilaterally, transmitted upper airway sounds. No wheezing/rhonchi/rales. No accessory muscle use   CV:  Regular rhythm, normal rate, no murmurs, gallops, rubs    GI:  Soft, non distended, non tender. normoactive bowel sounds, no hepatosplenomegaly     Musculoskeletal:  RUE edema, warm, 2+ pulses throughout    Neurologic:  Moves all extremities, strength equal bilaterally. AAOx2 (self, place, said 1921 for year), CN II-XII reviewed and grossly intact. Data Review:    Review and/or order of clinical lab test  Review and/or order of tests in the radiology section of CPT  Review and/or order of tests in the medicine section of CPT      Labs:     Recent Labs     11/20/21  0400 11/19/21  0452   WBC 12.2* 12.9*   HGB 9.5* 11.1*   HCT 29.2* 34.1*    230     Recent Labs     11/20/21  0412 11/20/21  0400 11/19/21  0452 11/18/21  1226 11/18/21  0909   NA  --  134* 132*  --  136   K  --  3.2* 3.6  --  4.2   CL  --  104 101  --  106   CO2  --  22 22  --  24   BUN  --  40* 36*  --  35*   CREA  --  1.93* 1.89*  --  1.79*   GLU  --  71 101*  --  95   CA 13.0* 12.9* 15.1*   < > 14.8*   MG  --  1.9 1.4*  --  1.5*   PHOS  --  3.2 4.1  --  3.8    < > = values in this interval not displayed.      Recent Labs     11/20/21  0400 11/19/21  0452 11/18/21  0909   ALT 26 24 21   AP 54 59 55 TBILI 0.3 0.4 0.3   TP 6.8 7.2 6.3*   ALB 2.8* 3.0* 2.7*   GLOB 4.0 4.2* 3.6     No results for input(s): INR, PTP, APTT, INREXT, INREXT in the last 72 hours. No results for input(s): FE, TIBC, PSAT, FERR in the last 72 hours. No results found for: FOL, RBCF   No results for input(s): PH, PCO2, PO2 in the last 72 hours. No results for input(s): CPK, CKNDX, TROIQ in the last 72 hours.     No lab exists for component: CPKMB  No results found for: CHOL, CHOLX, CHLST, CHOLV, HDL, HDLP, LDL, LDLC, DLDLP, TGLX, TRIGL, TRIGP, CHHD, CHHDX  Lab Results   Component Value Date/Time    Glucose (POC) 197 (H) 11/20/2021 12:31 PM    Glucose (POC) 63 (L) 11/20/2021 10:52 AM     No results found for: COLOR, APPRN, SPGRU, REFSG, KATY, PROTU, GLUCU, KETU, BILU, UROU, IRASEMA, LEUKU, GLUKE, EPSU, BACTU, WBCU, RBCU, CASTS, UCRY      Medications Reviewed:     Current Facility-Administered Medications   Medication Dose Route Frequency    dextrose (D50W) injection syrg 25 g  25 g IntraVENous PRN    dextrose 5% and 0.9% NaCl infusion  100 mL/hr IntraVENous CONTINUOUS    [START ON 11/21/2021] aspirin (ASA) suppository 150 mg  150 mg Rectal DAILY    cinacalcet (SENSIPAR) tablet 90 mg  90 mg Oral BID WITH MEALS    amLODIPine (NORVASC) tablet 10 mg  10 mg Oral DAILY    hydrALAZINE (APRESOLINE) 20 mg/mL injection 10 mg  10 mg IntraVENous Q6H PRN    rosuvastatin (CRESTOR) tablet 10 mg  10 mg Oral QHS    sodium chloride (NS) flush 5-40 mL  5-40 mL IntraVENous Q8H    sodium chloride (NS) flush 5-40 mL  5-40 mL IntraVENous PRN    acetaminophen (TYLENOL) tablet 650 mg  650 mg Oral Q6H PRN    Or    acetaminophen (TYLENOL) suppository 650 mg  650 mg Rectal Q6H PRN    polyethylene glycol (MIRALAX) packet 17 g  17 g Oral DAILY PRN    ondansetron (ZOFRAN ODT) tablet 4 mg  4 mg Oral Q8H PRN    Or    ondansetron (ZOFRAN) injection 4 mg  4 mg IntraVENous Q6H PRN    heparin (porcine) injection 5,000 Units  5,000 Units SubCUTAneous Q12H ______________________________________________________________________  EXPECTED LENGTH OF STAY: 3d 12h  ACTUAL LENGTH OF STAY:          7                 Froilan Warren MD

## 2021-11-20 NOTE — PROGRESS NOTES
Discussed care w/ Dr. Sharon Love last night. Non-localizing Sestamibi study. MBS shows new onset oropharyngeal dysphagia w/ aspiration. This is not consistent w/ obstructive symptoms that goiter could cause. This prompted stat CT head that was unrevealing. PTH and calcium improved on Sensipar and IV fluids although both still elevated. We discussed getting MRI neck when getting MRI brain today if unable to get CT neck w/ contrast.  Need to evaluate for cervical amarilys disease as parathyroid carcinoma is a potential.  Also with nonlocalized hyperparathyroidism and substernal goiter, operation would be more challenging and perhaps longer than typical.  May require sternotomy for exploration. Will discuss with my partner and hospitalist service re: surgical plan, Ms. Sabino Medina ability to tolerate surgery from a medical perspective and family wishes. Continue with aggressive medical treatment for hyperparathyroidism. Norris Damico, [address] [facility] [address], [facility]  [address] [facility], [address]   J) [phone]  (I) [phone]

## 2021-11-20 NOTE — PROGRESS NOTES
Bedside and Verbal shift change report given to DEEPAK Cormier (oncoming nurse) by Kae Arellano (offgoing nurse). Report included the following information SBAR, Kardex, MAR, Recent Results, Med Rec Status and Cardiac Rhythm NSR.

## 2021-11-20 NOTE — PROGRESS NOTES
Bedside shift change report given to Raul Castellon RN (oncoming nurse) by AprilDEEPAK (offgoing nurse). Report included the following information SBAR, Kardex, Intake/Output, MAR, Accordion and Cardiac Rhythm NSR.

## 2021-11-20 NOTE — PROGRESS NOTES
NAME: Bobby Blizzard        :  1935        MRN:  [id number]                     Assessment   :                                               Plan:  VÍCTOR  Hyponatremia, mild  Severe hypercalcemia  Constipation  Anorexia  Right paratracheal mass/goiter  Primary hyperparathyroidism? HTN  Hypokalemia Creatinine 2.3 to 1.7-1.9mg/dl and holding now; nml SHELLEY  Very high PTH (1720)- was c/w primary HPT. Serum Ca was improving with Sensipar . Repeat iPTH while on Sensipar 90mg BID down to 100    Growing suspicion of parathyroid carcinoma given how high her IPTH levels were but no clear mass noted on MRI. Await CT Neck/chest with IV Contrast    Airway needs to be secured prior to CT scan. Awaiting transfer to ICU    Discussed risks of DARRIN with patient's family. Willing to accept risk. Ct IV NS    Discussed case with patient's daughter and Dr. Moris Clemens  They have been resistant to make her DNR or consider more conservative approach. Wish to have everything done at this point. Her candidacy for any major surgical intervention will likely be limited given her advanced age. Subjective:     Chief Complaint: Progressive issues clearing secretions and swallowing. O2 requirement increasing. Daughter at bedside. Review of Systems:    Symptom Y/N Comments  Symptom Y/N Comments   Fever/Chills    Chest Pain     Poor Appetite    Edema     Cough    Abdominal Pain     Sputum    Joint Pain     SOB/ARIZA    Pruritis/Rash     Nausea/vomit    Tolerating PT/OT     Diarrhea    Tolerating Diet     Constipation    Other       Could not obtain due to:      Objective:     VITALS:   Last 24hrs VS reviewed since prior progress note.  Most recent are:  Visit Vitals  /70 (BP 1 Location: Left upper arm, BP Patient Position: At rest)   Pulse 82   Temp 97.6 °F (36.4 °C)   Resp 18   Ht 5' 6\" (1.676 m)   Wt 57.3 kg (126 lb 6.4 oz)   SpO2 91%   BMI 20.40 kg/m² Intake/Output Summary (Last 24 hours) at 11/20/2021 1732  Last data filed at 11/20/2021 6046  Gross per 24 hour   Intake 1000 ml   Output 900 ml   Net 100 ml      Telemetry Reviewed:     PHYSICAL EXAM:  General: Resp distress  +Stridor  No edema  UA transmitted sounds    Lab Data Reviewed: (see below)    Medications Reviewed: (see below)    PMH/SH reviewed - no change compared to H&P  ________________________________________________________________________  Care Plan discussed with:  Patient Y    Family  Y    RN     Care Manager                    Consultant:          Comments   >50% of visit spent in counseling and coordination of care       ________________________________________________________________________  Doc Patel MD     Procedures: see electronic medical records for all procedures/Xrays and details which  were not copied into this note but were reviewed prior to creation of Plan. LABS:  Recent Labs     11/20/21  0400 11/19/21  0452   WBC 12.2* 12.9*   HGB 9.5* 11.1*   HCT 29.2* 34.1*    230     Recent Labs     11/20/21  0412 11/20/21  0400 11/19/21  0452 11/18/21  1226 11/18/21  0909   NA  --  134* 132*  --  136   K  --  3.2* 3.6  --  4.2   CL  --  104 101  --  106   CO2  --  22 22  --  24   BUN  --  40* 36*  --  35*   CREA  --  1.93* 1.89*  --  1.79*   GLU  --  71 101*  --  95   CA 13.0* 12.9* 15.1*   < > 14.8*   MG  --  1.9 1.4*  --  1.5*   PHOS  --  3.2 4.1  --  3.8    < > = values in this interval not displayed. Recent Labs     11/20/21  0400 11/19/21  0452 11/18/21  0909   AP 54 59 55   TP 6.8 7.2 6.3*   ALB 2.8* 3.0* 2.7*   GLOB 4.0 4.2* 3.6     No results for input(s): INR, PTP, APTT, INREXT, INREXT in the last 72 hours. No results for input(s): FE, TIBC, PSAT, FERR in the last 72 hours. No results found for: FOL, RBCF   No results for input(s): PH, PCO2, PO2 in the last 72 hours. No results for input(s): CPK, CKMB in the last 72 hours.     No lab exists for component: TROPONINI  No components found for: GLPOC  No results found for: COLOR, APPRN, SPGRU, REFSG, KATY, PROTU, GLUCU, KETU, BILU, UROU, IRASEMA, LEUKU, GLUKE, EPSU, BACTU, WBCU, RBCU, CASTS, UCRY    MEDICATIONS:  Current Facility-Administered Medications   Medication Dose Route Frequency    dextrose (D50W) injection syrg 25 g  25 g IntraVENous PRN    dextrose 5% and 0.9% NaCl infusion  100 mL/hr IntraVENous CONTINUOUS    gadoteridoL (PROHANCE) 279.3 mg/mL contrast solution        ampicillin-sulbactam (UNASYN) 1.5 g in 0.9% sodium chloride (MBP/ADV) 50 mL MBP  1.5 g IntraVENous Q12H    cinacalcet (SENSIPAR) tablet 90 mg  90 mg Oral BID WITH MEALS    amLODIPine (NORVASC) tablet 10 mg  10 mg Oral DAILY    hydrALAZINE (APRESOLINE) 20 mg/mL injection 10 mg  10 mg IntraVENous Q6H PRN    rosuvastatin (CRESTOR) tablet 10 mg  10 mg Oral QHS    sodium chloride (NS) flush 5-40 mL  5-40 mL IntraVENous Q8H    sodium chloride (NS) flush 5-40 mL  5-40 mL IntraVENous PRN    acetaminophen (TYLENOL) tablet 650 mg  650 mg Oral Q6H PRN    Or    acetaminophen (TYLENOL) suppository 650 mg  650 mg Rectal Q6H PRN    polyethylene glycol (MIRALAX) packet 17 g  17 g Oral DAILY PRN    ondansetron (ZOFRAN ODT) tablet 4 mg  4 mg Oral Q8H PRN    Or    ondansetron (ZOFRAN) injection 4 mg  4 mg IntraVENous Q6H PRN    heparin (porcine) injection 5,000 Units  5,000 Units SubCUTAneous Q12H

## 2021-11-20 NOTE — PROGRESS NOTES
MRI completed of brain W/O contrast, no CVA noted. MRI neck could not be completed with contrast, however enough was scanned to show severe narrowing of her airway likely leading to airway compromise. Discussed with ENT, Dr. Jef Fall, ICU team, and Dr. Greer Angulo     - Transfer to ICU and secure airway  - CT head and neck + chest with IV contrast once airway secured.    - Likely will then require transfer to a facility with thoracic surgery given location of the mass  - Hold all antiplatelets and PPx given possible hematoma and bleeding tumor

## 2021-11-20 NOTE — PROGRESS NOTES
Day #1 of Unasyn  Indication:  Aspiration PNA risk  Current regimen:  Unasyn 1.5g q8h  Abx regimen: Unasyn  Recent Labs     21  0400 21  0452 21  0909   WBC 12.2* 12.9* 7.8   CREA 1.93* 1.89* 1.79*   BUN 40* 36* 35*     Est CrCl: 19.3 ml/min  Temp (24hrs), Av.2 °F (36.2 °C), Min:96.7 °F (35.9 °C), Max:97.6 °F (36.4 °C)      Plan: Change to Unasyn  1.5g q12h with respect to indication and renal status (CrCl 15-29 mL/min) per [facility]&[employer]-approved Renal Dosing Adjustment protocol. Pharmacy will continue to monitor this patient daily for changes in clinical status and renal function.

## 2021-11-20 NOTE — PROGRESS NOTES
07:45 During shift change this morning Pt's O2 sats were at 82%. Pt denied SOB, however was unable to come up above 85% with NC. We switched Pt to NRB and she is now satting at 92-94% maxed out on NRB. MD notified. 09:16 3 RNs attempted lab stick for stat lactic and procalcitonin and were unsuccessful. RT called for arterial stick. 11:44 Attempted to call for MRI, reached voicemail. 12:50 MRI form completed and verified with MRI. Was notified RN will be told when they have opening for Pt.    18:30 Received glycopyrrolate from pharmacy however was told by OR/PACU RN not to administer and send it down with the Pt instead. Medication given to transporting RN. 18:45 Received call from lab that Pt's PTT/INR was not filled enough for lab. Pt actively leaving floor for OR, unable to get redraw.

## 2021-11-20 NOTE — CONSULTS
[facility]    ICU TEAM Progress Note    Name: Bobby Blizzard   : 1935   MRN: [id number]   Date: 2021           ICU Assessment     1. Large lower R-midline paratracheal mass  2. Impending airway compromise   a. Tracheal deviation  b. ?Massive Goiter  c. ?Parathyroid Carcinoma  3. Primary hypercalcemia/primary hyperparathyroidism  4. VÍCTOR    Plan for OR for AFOI with anesthesiologist  Discussed risk/benifits with family  Rapid COVID sent  Give 0.2 mg of glycopyrolate  Precedex bolus in PACU prior to OR  After OR, will need CT Chest/Neck with Contrast         ICU Comprehensive Plan of Care:     1. Neurological System  Spontaneous Awakening Trial: N/A  Sedation: None  Analgesia: Fentanyl    2. Cardiovascular System  SBP Goal of: > 90 mmHg  MAP Goal of: > 65 mmHg  Transfusion Trigger (Hgb): <7 g/dL  Keep K > 4; Mg > 2     3. Respiratory System  Head of bed > 30 degrees  Spontaneous Breathing Trial: No  SpO2 Goal: > 92%  DVT Prophylaxis: SCD's or Sequential Compression Device     4. Renal/GI/Endocrine System  IVFs: KVO  Ulcer Prophylaxis: Not at this time   Bowel Regimen: None needed at this time  Feeding:  No   Blood Sugar Goal 120-180 - Glycemic Control: Insulin    5. Infectious Disease  Indwelling Catheter:  Tubes: None  Lines: Peripheral IV  Drains: None    6. PT/OT: pending     7. Goals of Care Discussion with family Yes     8. Plan of Care/Code Status: Full Code    9. Discussed Care Plan with Bedside RN    10.  Documentation of Current Medications    Subjective:   Progress Note: 2021      Reason for ICU Admission: Airway compromise     HPI:  (from HPI HM note as patient having some orientation issues)    This is an 43-year-old female who presented with fatigue, weakness, constipation and some transient confusion.  She was seen day before admission by her primary physician is Dr. Gilberto Holden from [facility] had some blood drawn for routine and Dr. Gilberto Holden called the daughter on day of admission and told her that the calcium was extremely high and she needed to come to the hospital. Shruthi Young has had no problems with chest pain, shortness of breath, nausea or vomiting and no other GI or  symptoms noted.  Patient is alert and oriented currently with no acute complaints.   Per pt, noticed lost weight last several years \" previous pants and clothes all too large for her now\" . Denied dysphagia. Denied voice changes.  No history of cancer.  .     Was called by HM Team, Dr. Trena Hale, due to respiratory insufficiency and possible airway compromise. During patients MRI today on 11/20, patient had difficult time, lying flat, and MRI showed paratracheal mass with some extension into the chest cavity. ENT on board, would like a CT neck/chest with contrast, to determine more about the pathology - patient needs intubated for this study. Overnight Events:   11/20 -- Admitted to ICU    Active Problem List:     Problem List  Date Reviewed: 11/14/2021          Codes Class    Thyroid mass ICD-10-CM: E07.9  ICD-9-CM: 246.9         Respiratory failure with hypoxia (HCC) ICD-10-CM: J96.91  ICD-9-CM: 518.81         * (Principal) Hypercalcemia ICD-10-CM: K11.48  ICD-9-CM: 275.42         Lung mass ICD-10-CM: R91.8  ICD-9-CM: 786.6               Past Medical History:      has no past medical history on file. Past Surgical History:      has no past surgical history on file. Home Medications:     Prior to Admission medications    Medication Sig Start Date End Date Taking? Authorizing Provider   chlorthalidone (HYGROTON) 25 mg tablet Take 25 mg by mouth daily. Yes Provider, Historical   amLODIPine (Norvasc) 5 mg tablet Take 5 mg by mouth daily. Yes Provider, Historical   rosuvastatin (CRESTOR) 10 mg tablet Take 10 mg by mouth nightly.    Yes Provider, Historical       Allergies/Social/Family History:     No Known Allergies   Social History     Tobacco Use    Smoking status: Not on file    Smokeless tobacco: Not on file   Substance Use Topics    Alcohol use: Not on file      No family history on file. Review of Systems:     A comprehensive review of systems was negative except for that written in the HPI. Objective:   Vital Signs:  Visit Vitals  /70 (BP 1 Location: Left upper arm, BP Patient Position: At rest)   Pulse 79   Temp 97.6 °F (36.4 °C)   Resp 18   Ht 5' 6\" (1.676 m)   Wt 57.3 kg (126 lb 6.4 oz)   SpO2 91%   BMI 20.40 kg/m²    O2 Flow Rate (L/min): 15 l/min O2 Device: Hi flow nasal cannula (midflow) Temp (24hrs), Av.2 °F (36.2 °C), Min:96.7 °F (35.9 °C), Max:97.6 °F (36.4 °C)           Intake/Output:     Intake/Output Summary (Last 24 hours) at 2021 1810  Last data filed at 2021 7776  Gross per 24 hour   Intake 1000 ml   Output 900 ml   Net 100 ml       Physical Exam:    General appearance: cooperative, mild distress, appears stated age  Head: Normocephalic, without obvious abnormality, atraumatic  Eyes: negative  Nose: Nares normal. Septum midline. Mucosa normal. No drainage or sinus tenderness. Throat: Lips, mucosa, and tongue normal. Teeth and gums normal  Lungs: clear to auscultation bilaterally  Heart: regular rate and rhythm, S1, S2 normal, no murmur, click, rub or gallop  Abdomen: soft, non-tender.  Bowel sounds normal. No masses,  no organomegaly  Extremities: extremities normal, atraumatic, no cyanosis or edema  Pulses: 2+ and symmetric  Neurologic: Grossly normal    LABS AND  DATA: Personally reviewed  Recent Labs     21   WBC 12.2* 12.9*   HGB 9.5* 11.1*   HCT 29.2* 34.1*    230     Recent Labs     21  0412 21  0400 21  0452 21  0452   NA  --  134*  --  132*   K  --  3.2*  --  3.6   CL  --  104  --  101   CO2  --  22  --  22   BUN  --  40*  --  36*   CREA  --  1.93*  --  1.89*   GLU  --  71  --  101*   CA 13.0* 12.9*   < > 15.1*   MG  --  1.9  --  1.4*   PHOS  --  3.2  --  4.1    < > = values in this interval not displayed. Recent Labs     11/20/21  0400 11/19/21  0452   AP 54 59   TP 6.8 7.2   ALB 2.8* 3.0*   GLOB 4.0 4.2*     No results for input(s): INR, PTP, APTT, INREXT in the last 72 hours. Recent Labs     11/20/21  0938   PHI 7.36   PCO2I 36.9   PO2I 81     No results for input(s): CPK, CKMB, TROIQ, BNPP in the last 72 hours. Hemodynamics:   PAP:   CO:     Wedge:   CI:     CVP:    SVR:       PVR:       Ventilator Settings:  Mode Rate Tidal Volume Pressure FiO2 PEEP                    Peak airway pressure:      Minute ventilation:          MEDS: Reviewed    Chest X-Ray:  CXR Results  (Last 48 hours)               11/20/21 0939  XR CHEST PORT Final result    Impression:  1. No acute disease           Narrative:  INDICATION:  hypoxia        Exam: Portable chest 11/13/2021. Comparison: Comparison prior chest CT. Findings: Cardiac silhouette is not enlarged. Soft tissue density in the right   superior mediastinum deviating the trachea to the left is compatible with a   large goiter. Pulmonary vasculature is not engorged. There are no focal   parenchymal opacities, effusions, or pneumothorax. Multidisciplinary Rounds Completed:  Pending    ABCDEF Bundle/Checklist Completed:  Yes    SPECIAL EQUIPMENT  None    DISPOSITION  Stay in ICU    CRITICAL CARE CONSULTANT NOTE  I had a face to face encounter with the patient, reviewed and interpreted patient data including clinical events, labs, images, vital signs, I/O's, and examined patient. I have discussed the case and the plan and management of the patient's care with the consulting services, the bedside nurses and the respiratory therapist.      NOTE OF PERSONAL INVOLVEMENT IN CARE   This patient has a high probability of imminent, clinically significant deterioration, which requires the highest level of preparedness to intervene urgently.  I participated in the decision-making and personally managed or directed the management of the following life and organ supporting interventions that required my frequent assessment to treat or prevent imminent deterioration. I personally spent 55 minutes of critical care time. This is time spent at this critically ill patient's bedside actively involved in patient care as well as the coordination of care. This does not include any procedural time which has been billed separately.     Tg Oneill DO, MS  Staff Intensivist/Anesthesiologist  [facility]  11/20/2021

## 2021-11-20 NOTE — PROGRESS NOTES
Physician Progress Note      PATIENT:               Angela Garcia  CSN #:                  [id number]  :                       1935  ADMIT DATE:       2021 1:03 PM  [address] [place] DATE:  RESPONDING  PROVIDER #:        Jose Vance MD        QUERY TEXT:    Stage of Chronic Kidney Disease: Please provide further specificity, if known. Clinical indicators include: ckd, bun  Options provided:  -- Chronic kidney disease stage 1  -- Chronic kidney disease stage 2  -- Chronic kidney disease stage 3  -- Chronic kidney disease stage 3a  -- Chronic kidney disease stage 3b  -- Chronic kidney disease stage 4  -- Chronic kidney disease stage 5  -- Chronic kidney disease stage 5, requiring dialysis  -- End stage renal disease  -- Other - I will add my own diagnosis  -- Disagree - Not applicable / Not valid  -- Disagree - Clinically Unable to determine / Unknown        PROVIDER RESPONSE TEXT:    The patient has chronic kidney disease stage 3.       Electronically signed by:  Jose Vance MD 2021 7:52 AM

## 2021-11-20 NOTE — PROGRESS NOTES
+                                                                                                                                                                                     Critical Care Documentation    Name: Cora Hernandez  YOB: 1935  MRN: [id number]  Admission Date: 11/13/2021  1:03 PM    Date of service: 11/20/2021    Active Diagnoses:    Hospital Problems  Date Reviewed: 11/14/2021          Codes Class Noted POA    * (Principal) Hypercalcemia ICD-10-CM: P58.97  ICD-9-CM: 275.42  11/13/2021 Yes        Lung mass ICD-10-CM: R91.8  ICD-9-CM: 786.6  11/13/2021 Unknown              Chief Complaint:  Hypoxia     Clinical Presentation:  Jessie Klein a 81 y. o. female who presented with constipation, fatigue, found to have hypercalcemia with elevated PTH consistent with primary parathryoidism. Underwent CT non con of chest which sowed a paratracheal mass. ENT consulted, and needed NM parathyroid scan which was negative for an adenoma. On Friday pt was noted to have pooling of secretions, which was new for her. Full neuro exam done and was negative otherwise. Given her location of the mass, ENT was reconsulted. After discussion with Dr. Maine Arevalo, he does not think her dysphagia is secondary to this mass. Our main concern now is that she may have had a CVA. MRI head and neck pending. This am, patient decompensated from respiratory standpoint. O2 saturations remained in 80's on max NC. She had a NRB placed, and O2 saturations remained upper 80's. Physical Exam:   Gen: NAD, awake in bed  HEENT: NC/AT, sclera anicteric, PERRL, dry oral mucosa, pooled gurgling. Nodule over the L thyroid lobe  CV: RRR no m/r/g, normal S1 and S2, no pedal edema   Resp: CTA b/l no increased work of breathing, no wheezing or rhonchi,   Abd: NT/ND, normal bowel sounds, no rebound or guarding  Ext: 2+ pulses, no edema  Skin: No rashes or lesions  Neuro: Oriented to self, place, and said 1921. CN grossly intact.  + dysphagia, no facial droop, some dysarthria. Normal strength in both UE, LE, and normal coordination. Did not observe gait. Data Reviewed: All diagnostic labs and studies have been reviewed. Assessment and Plan:  Mrs. BENY Thompson is a 87y/o female who presented with fatigue and found to have hypercalcemia 2/2 primary hyperparathyroidism, concerning for PTH secreting tumor. Pt with new onset dysarthria and dysphagia concerning for CVA vs. Tumor involvement. - Deep suctioning performed multiple times. Continue to deep suction every 2 hours or so  - MRI head and neck ordered. Long discussion with granddaughter and niece (who is MD) at bedside. There is a risk of further aspiration if she lays flat for the exam. However, without it, we will not know the source of her dysphagia and likely not be able to make any decisions moving forward. The family is in agreement to proceed with MRI. They are aware there is a risk of respiratory decompensation which may lead to intubation. - pt remains FULL CODE. This was discussed with the patient, and family. Previously she stated she wants everything done. - CXR stat, ABG, lactic, procal all ordered  - Add dextrose to fluids given NPO status  - start HFNC or midflow NC if saturations tolerate. Kasi Dejesus (Niece, who is an MD anesthesiologist in [place])   [phone], work # [phone]    Medications Administered:   Sedation: [ ] yes [ ] no   Anxiolytics: [ ] yes [ ] no   Antiarrhythmics: [ ] yes [ ] no   Antihypertensives: [ ] yes [ ] no   Pressors: [ ] yes [ ] no   IVF's: [ ] yes [ ] no       Critical Care Attestation: This patient is unstable and critically ill. Due to a high probability of clinically significant, life threatening deterioration, the patient required my highest level of preparedness to intervene emergently and I personally spent this critical care time directly and personally managing the patient.  This critical care time included obtaining a history; examining the patient; pulse oximetry; ordering and review of studies; arranging urgent treatment with development of a management plan; evaluation of patient's response to treatment; frequent reassessment; and, discussions with other providers and/or family. This critical care time was performed to assess and manage the high probability of imminent, life-threatening deterioration that could result in multi-organ failure and death. It was exclusive of separately billable procedures, treating other patients, and teaching time. Time Spent:     I personally spent 60 minutes in providing critical care time.     Elina Pandya MD  11/20/2021  1:21 PM

## 2021-11-21 ENCOUNTER — APPOINTMENT (OUTPATIENT)
Dept: CT IMAGING | Age: 86
DRG: 643 | End: 2021-11-21
Attending: INTERNAL MEDICINE
Payer: MEDICARE

## 2021-11-21 ENCOUNTER — APPOINTMENT (OUTPATIENT)
Dept: GENERAL RADIOLOGY | Age: 86
DRG: 643 | End: 2021-11-21
Attending: INTERNAL MEDICINE
Payer: MEDICARE

## 2021-11-21 LAB
ALBUMIN SERPL-MCNC: 2.9 G/DL (ref 3.5–5)
ALBUMIN/GLOB SERPL: 1 {RATIO} (ref 1.1–2.2)
ALP SERPL-CCNC: 39 U/L (ref 45–117)
ALT SERPL-CCNC: 23 U/L (ref 12–78)
ANION GAP SERPL CALC-SCNC: 7 MMOL/L (ref 5–15)
AST SERPL-CCNC: 20 U/L (ref 15–37)
BILIRUB SERPL-MCNC: 0.5 MG/DL (ref 0.2–1)
BUN SERPL-MCNC: 48 MG/DL (ref 6–20)
BUN/CREAT SERPL: 20 (ref 12–20)
CALCIUM SERPL-MCNC: 11.3 MG/DL (ref 8.5–10.1)
CHLORIDE SERPL-SCNC: 110 MMOL/L (ref 97–108)
CO2 SERPL-SCNC: 21 MMOL/L (ref 21–32)
CREAT SERPL-MCNC: 2.42 MG/DL (ref 0.55–1.02)
ERYTHROCYTE [DISTWIDTH] IN BLOOD BY AUTOMATED COUNT: 12.7 % (ref 11.5–14.5)
GLOBULIN SER CALC-MCNC: 2.9 G/DL (ref 2–4)
GLUCOSE BLD STRIP.AUTO-MCNC: 173 MG/DL (ref 65–117)
GLUCOSE BLD STRIP.AUTO-MCNC: 55 MG/DL (ref 65–117)
GLUCOSE BLD STRIP.AUTO-MCNC: 67 MG/DL (ref 65–117)
GLUCOSE BLD STRIP.AUTO-MCNC: 94 MG/DL (ref 65–117)
GLUCOSE SERPL-MCNC: 61 MG/DL (ref 65–100)
HCT VFR BLD AUTO: 20.2 % (ref 35–47)
HCT VFR BLD AUTO: 25.1 % (ref 35–47)
HGB BLD-MCNC: 6.6 G/DL (ref 11.5–16)
HGB BLD-MCNC: 8.2 G/DL (ref 11.5–16)
HISTORY CHECKED?,CKHIST: NORMAL
MAGNESIUM SERPL-MCNC: 1.8 MG/DL (ref 1.6–2.4)
MCH RBC QN AUTO: 29.1 PG (ref 26–34)
MCHC RBC AUTO-ENTMCNC: 32.7 G/DL (ref 30–36.5)
MCV RBC AUTO: 89 FL (ref 80–99)
NRBC # BLD: 0 K/UL (ref 0–0.01)
NRBC BLD-RTO: 0 PER 100 WBC
PHOSPHATE SERPL-MCNC: 2.4 MG/DL (ref 2.6–4.7)
PLATELET # BLD AUTO: 176 K/UL (ref 150–400)
PMV BLD AUTO: 12 FL (ref 8.9–12.9)
POTASSIUM SERPL-SCNC: 3.6 MMOL/L (ref 3.5–5.1)
PROT SERPL-MCNC: 5.8 G/DL (ref 6.4–8.2)
RBC # BLD AUTO: 2.27 M/UL (ref 3.8–5.2)
SERVICE CMNT-IMP: ABNORMAL
SERVICE CMNT-IMP: ABNORMAL
SERVICE CMNT-IMP: NORMAL
SERVICE CMNT-IMP: NORMAL
SODIUM SERPL-SCNC: 138 MMOL/L (ref 136–145)
WBC # BLD AUTO: 13.8 K/UL (ref 3.6–11)

## 2021-11-21 PROCEDURE — 74011250636 HC RX REV CODE- 250/636: Performed by: INTERNAL MEDICINE

## 2021-11-21 PROCEDURE — 94003 VENT MGMT INPAT SUBQ DAY: CPT

## 2021-11-21 PROCEDURE — 83735 ASSAY OF MAGNESIUM: CPT

## 2021-11-21 PROCEDURE — 74011250636 HC RX REV CODE- 250/636: Performed by: HOSPITALIST

## 2021-11-21 PROCEDURE — 82962 GLUCOSE BLOOD TEST: CPT

## 2021-11-21 PROCEDURE — 71045 X-RAY EXAM CHEST 1 VIEW: CPT

## 2021-11-21 PROCEDURE — 74011000250 HC RX REV CODE- 250: Performed by: INTERNAL MEDICINE

## 2021-11-21 PROCEDURE — 85018 HEMOGLOBIN: CPT

## 2021-11-21 PROCEDURE — 70491 CT SOFT TISSUE NECK W/DYE: CPT

## 2021-11-21 PROCEDURE — 74011250636 HC RX REV CODE- 250/636

## 2021-11-21 PROCEDURE — 84100 ASSAY OF PHOSPHORUS: CPT

## 2021-11-21 PROCEDURE — 36430 TRANSFUSION BLD/BLD COMPNT: CPT

## 2021-11-21 PROCEDURE — P9045 ALBUMIN (HUMAN), 5%, 250 ML: HCPCS

## 2021-11-21 PROCEDURE — P9016 RBC LEUKOCYTES REDUCED: HCPCS

## 2021-11-21 PROCEDURE — 74011000258 HC RX REV CODE- 258: Performed by: INTERNAL MEDICINE

## 2021-11-21 PROCEDURE — 30233N1 TRANSFUSION OF NONAUTOLOGOUS RED BLOOD CELLS INTO PERIPHERAL VEIN, PERCUTANEOUS APPROACH: ICD-10-PCS | Performed by: ANESTHESIOLOGY

## 2021-11-21 PROCEDURE — 71260 CT THORAX DX C+: CPT

## 2021-11-21 PROCEDURE — 85027 COMPLETE CBC AUTOMATED: CPT

## 2021-11-21 PROCEDURE — 74011000636 HC RX REV CODE- 636: Performed by: RADIOLOGY

## 2021-11-21 PROCEDURE — 80053 COMPREHEN METABOLIC PANEL: CPT

## 2021-11-21 PROCEDURE — 65620000000 HC RM CCU GENERAL

## 2021-11-21 PROCEDURE — 36415 COLL VENOUS BLD VENIPUNCTURE: CPT

## 2021-11-21 PROCEDURE — 77030008771 HC TU NG SALEM SUMP -A

## 2021-11-21 RX ORDER — SODIUM CHLORIDE, SODIUM LACTATE, POTASSIUM CHLORIDE, CALCIUM CHLORIDE 600; 310; 30; 20 MG/100ML; MG/100ML; MG/100ML; MG/100ML
75 INJECTION, SOLUTION INTRAVENOUS CONTINUOUS
Status: DISCONTINUED | OUTPATIENT
Start: 2021-11-21 | End: 2021-11-21

## 2021-11-21 RX ORDER — CHLORHEXIDINE GLUCONATE 1.2 MG/ML
15 RINSE ORAL EVERY 12 HOURS
Status: DISCONTINUED | OUTPATIENT
Start: 2021-11-21 | End: 2021-11-25 | Stop reason: HOSPADM

## 2021-11-21 RX ORDER — SODIUM CHLORIDE 9 MG/ML
250 INJECTION, SOLUTION INTRAVENOUS AS NEEDED
Status: DISCONTINUED | OUTPATIENT
Start: 2021-11-21 | End: 2021-11-25 | Stop reason: HOSPADM

## 2021-11-21 RX ORDER — DEXTROSE MONOHYDRATE 50 MG/ML
50 INJECTION, SOLUTION INTRAVENOUS CONTINUOUS
Status: DISCONTINUED | OUTPATIENT
Start: 2021-11-21 | End: 2021-11-25 | Stop reason: HOSPADM

## 2021-11-21 RX ORDER — MILRINONE LACTATE 0.2 MG/ML
INJECTION, SOLUTION INTRAVENOUS
Status: DISPENSED
Start: 2021-11-21 | End: 2021-11-21

## 2021-11-21 RX ADMIN — PHENYLEPHRINE HYDROCHLORIDE 20 MCG/MIN: 10 INJECTION INTRAVENOUS at 23:50

## 2021-11-21 RX ADMIN — SODIUM CHLORIDE 1.5 G: 900 INJECTION INTRAVENOUS at 05:01

## 2021-11-21 RX ADMIN — ALBUMIN HUMAN 25 G: 50 SOLUTION INTRAVENOUS at 00:00

## 2021-11-21 RX ADMIN — MIDAZOLAM 2 MG/HR: 5 INJECTION INTRAMUSCULAR; INTRAVENOUS at 08:06

## 2021-11-21 RX ADMIN — Medication 40 ML: at 15:02

## 2021-11-21 RX ADMIN — Medication 10 ML: at 21:24

## 2021-11-21 RX ADMIN — FENTANYL CITRATE 50 MCG/HR: 50 INJECTION INTRAVENOUS at 21:56

## 2021-11-21 RX ADMIN — SODIUM CHLORIDE, POTASSIUM CHLORIDE, SODIUM LACTATE AND CALCIUM CHLORIDE 75 ML/HR: 600; 310; 30; 20 INJECTION, SOLUTION INTRAVENOUS at 05:09

## 2021-11-21 RX ADMIN — IOPAMIDOL 100 ML: 612 INJECTION, SOLUTION INTRAVENOUS at 04:05

## 2021-11-21 RX ADMIN — CHLORHEXIDINE GLUCONATE 15 ML: 1.2 RINSE ORAL at 21:24

## 2021-11-21 RX ADMIN — CHLORHEXIDINE GLUCONATE 15 ML: 1.2 RINSE ORAL at 03:00

## 2021-11-21 RX ADMIN — FAMOTIDINE: 10 INJECTION INTRAVENOUS at 05:01

## 2021-11-21 RX ADMIN — CHLORHEXIDINE GLUCONATE 15 ML: 1.2 RINSE ORAL at 08:09

## 2021-11-21 RX ADMIN — Medication 10 ML: at 05:09

## 2021-11-21 RX ADMIN — ALBUMIN (HUMAN) 25 G: 12.5 INJECTION, SOLUTION INTRAVENOUS at 00:00

## 2021-11-21 RX ADMIN — DEXTROSE MONOHYDRATE 50 ML/HR: 50 INJECTION, SOLUTION INTRAVENOUS at 23:16

## 2021-11-21 RX ADMIN — DEXTROSE MONOHYDRATE 50 ML/HR: 50 INJECTION, SOLUTION INTRAVENOUS at 13:01

## 2021-11-21 RX ADMIN — HEPARIN SODIUM 5000 UNITS: 5000 INJECTION INTRAVENOUS; SUBCUTANEOUS at 16:31

## 2021-11-21 RX ADMIN — HEPARIN SODIUM 5000 UNITS: 5000 INJECTION INTRAVENOUS; SUBCUTANEOUS at 05:01

## 2021-11-21 RX ADMIN — PHENYLEPHRINE HYDROCHLORIDE 10 MCG/MIN: 10 INJECTION INTRAVENOUS at 16:38

## 2021-11-21 RX ADMIN — SODIUM CHLORIDE 1.5 G: 900 INJECTION INTRAVENOUS at 17:40

## 2021-11-21 RX ADMIN — DEXTROSE MONOHYDRATE 25 G: 25 INJECTION, SOLUTION INTRAVENOUS at 16:37

## 2021-11-21 NOTE — PROGRESS NOTES
1530 Bedside and Verbal shift change report given to DEEPAK Srinivasan (oncoming nurse) by Raliegh Boast (offgoing nurse). Report included the following information SBAR, Kardex, Intake/Output, MAR and Cardiac Rhythm NSr.     1700 Dr. Janet Lopez spoke to Dr. Teodora Schneider. Suggested transfer to [facility], process started. 1830 h&h sent    1930 Bedside and Verbal shift change report given to DEEPAK Hernandez (oncoming nurse) by Noel Guzman (offgoing nurse).  Report included the following information SBAR, Kardex, Intake/Output, MAR and Cardiac Rhythm SB.

## 2021-11-21 NOTE — PROGRESS NOTES
ICU Progress Note    Spoke with ENT Dr. Ximena Lopez regarding plan for securing airway. Agrees with plan, and no surgical back up would be benificial per ENT. Awake Fiberoptic Intubation planned for in the OR with anesthesia team (Discussed with Dr. Toni Hernandez). Discussed with family plan to have sedation and topical anesthesia given, then airway secured. If airway cannot be secured, plan to awaken and transport back to CCU.     Bethel Snellen,    Staff Intensivist/Anesthesiologist  Critical Care Medicine

## 2021-11-21 NOTE — PROGRESS NOTES
81 y.o. female admitted for hypercalcemia and primary hyperparathyroidism. Receiving aggressive medical treatment yet Ca and PTH has remained elevated increasing concern for parathyroid carcinoma. Over past 48 hours developed acute onset dysphagia, dysphonia with aspiration on MBS. MRI brain and CT head ruled out stroke. She does have a large thyroid goiter as well but did not endorse compressive symptoms at initial presentation. Regardless,she needed to be intubated yesterday and transferred to the CCU for ongoing care. CT neck done this morning. No overnight events otherwise. No family at bedside and patient presently sedated    Visit Vitals  BP (!) 111/49   Pulse 60   Temp 97.8 °F (36.6 °C)   Resp 15   Ht 5' 6\" (1.676 m)   Wt 57.3 kg (126 lb 6.4 oz)   SpO2 99%   BMI 20.40 kg/m²    ROS: unable to obtain  PE:  Sedated  Endotracheal tube secure  No palpable LAD      Lab Results   Component Value Date/Time    Calcium 11.3 (H) 11/21/2021 06:09 AM    Phosphorus 2.4 (L) 11/21/2021 06:09 AM    PTH, Intact 115.1 (H) 11/20/2021 04:12 AM      CT neck 11/21/21:  No lymphadenopathy. No clear parathyroid adenoma/tumor. Large right retrosternal thyroid lobe. Also large >4cm left superior pole thyroid nodule. CT chest 11/21/21: pleural effusions and pneumonia    A/P:  Large multinodular goiter as well as severe primary hyperparathyroidism causing hypercalcemia with improvement but not resolution with increasing doses of Sensipar. Also VÍCTOR. Now with aspiration pneumonia and intubated. This is a very complex case. I believe she will need surgery involving at least right chyna if not total thyroidectomy with neck exploration for parathyroid adenoma/carcinoma. Unclear why she had such an acute decompensation over the weekend with aspiration but does raise concern for recurrent laryngeal nerve involvement with a malignant process. Also with her aspiration PNA would likely need a tracheostomy concurrently.   Added complexity is retrosternal component without thoracic surgery available. I will need to discuss with my partners and convene with ICU team and family about next steps. Please keep intubated for now with supportive care. Norris Damico, [address] [facility] [address], [facility]  [address] [facility], [facility] [address]   L) [phone] (O) [phone]

## 2021-11-21 NOTE — PROGRESS NOTES
Problem: Pressure Injury - Risk of  Goal: *Prevention of pressure injury  Description: Document Hayder Scale and appropriate interventions in the flowsheet. Outcome: Progressing Towards Goal  Note: Pressure Injury Interventions:  Sensory Interventions: Avoid rigorous massage over bony prominences, Float heels, Keep linens dry and wrinkle-free, Maintain/enhance activity level, Minimize linen layers, Pad between skin to skin, Pressure redistribution bed/mattress (bed type), Turn and reposition approx. every two hours (pillows and wedges if needed)    Moisture Interventions: Absorbent underpads, Apply protective barrier, creams and emollients, Check for incontinence Q2 hours and as needed, Maintain skin hydration (lotion/cream), Minimize layers, Moisture barrier, Offer toileting Q_hr    Activity Interventions: Pressure redistribution bed/mattress(bed type)    Mobility Interventions: Assess need for specialty bed, Float heels, HOB 30 degrees or less, Pressure redistribution bed/mattress (bed type), Turn and reposition approx.  every two hours(pillow and wedges)    Nutrition Interventions: Document food/fluid/supplement intake, Offer support with meals,snacks and hydration    Friction and Shear Interventions: Apply protective barrier, creams and emollients, HOB 30 degrees or less, Lift sheet, Lift team/patient mobility team, Minimize layers                Problem: Patient Education: Go to Patient Education Activity  Goal: Patient/Family Education  Outcome: Progressing Towards Goal

## 2021-11-21 NOTE — ANESTHESIA POSTPROCEDURE EVALUATION
Procedure(s):  Awake fiberoptic intubation. MAC    Anesthesia Post Evaluation        Patient location during evaluation: PACU  Note status: Adequate. Level of consciousness: responsive to verbal stimuli and sleepy but conscious  Pain management: satisfactory to patient  Airway patency: patent  Anesthetic complications: no  Cardiovascular status: acceptable  Respiratory status: acceptable  Hydration status: acceptable  Comments: +Post-Anesthesia Evaluation and Assessment    Patient: Michael Calloway MRN: [id number]  SSN: [id number]   YOB: 1935  Age: 81 y.o. Sex: female          Cardiovascular Function/Vital Signs    /70 (BP 1 Location: Left upper arm, BP Patient Position: At rest)   Pulse 79   Temp 36.4 °C (97.6 °F)   Resp 18   Ht 5' 6\" (1.676 m)   Wt 57.3 kg (126 lb 6.4 oz)   SpO2 91%   BMI 20.40 kg/m²     Patient is status post Procedure(s):  Awake fiberoptic intubation. Nausea/Vomiting: Controlled. Postoperative hydration reviewed and adequate. Pain:  Pain Scale 1: Numeric (0 - 10) (11/20/21 1334)  Pain Intensity 1: 0 (11/20/21 1334)   Managed. Neurological Status: At baseline. Mental Status and Level of Consciousness: Arousable. Pulmonary Status:   O2 Device: Hi flow nasal cannula (midflow) (11/20/21 1334)   Adequate oxygenation and airway patent. Complications related to anesthesia: None    Post-anesthesia assessment completed. No concerns. I have evaluated the patient and the patient is stable and ready to be discharged from PACU . Signed By: Renee Zamarripa MD    11/20/2021      After the procedure was coomplete I had a discussion with the family. I explained how we intubated the patient and also got consent for photography and for publication . Family agrees . INITIAL Post-op Vital signs: No vitals data found for the desired time range.

## 2021-11-21 NOTE — PROGRESS NOTES
I reviewed the CT neck with my partners who do the most thyroid and parathyroid surgery in our practice. Both feel that it would be prudent to have thoracic surgeon available for possible sternotomy. Also if parathyroid carcinoma may need lymphadenectomy. Relayed recommendation to ICU staff to initiate transfer to [facility] for higher level of care as family wishes to pursue most aggressive treatment. I had a long conversation with her daughter and her granddaughter (Dr. Aparna Torres). I relayed this update re: our collective opinion to Dr. Aparna Torres who will relay on to the rest of the family. While awaiting transfer, if here tomorrow or into the next day, I would suggest an ultrasound guided FNA of her left thyroid nodule if radiology is able. This could be the malignant lesion and aid consideration for treatment options prior to surgery. Continue with supportive measures and treatment of aspiration pneumonia. I greatly appreciate intensivist support and effort. I am available if needed to speak with the accepting team/physician to relay our specific concerns regarding her case and assist as able from [facility]. [place] [facility], [address] [facility] [address], [facility]  [address] [facility], [facility] [address]   (P) [phone]  (B) [phone]  (V) [phone]

## 2021-11-21 NOTE — PROGRESS NOTES
0730- Bedside and Verbal shift change report given to Marcus Jurado RN (oncoming nurse) by Shelly Frias RN (offgoing nurse). Report included the following information SBAR, Kardex, OR Summary, Procedure Summary, Intake/Output, MAR, Recent Results and Cardiac Rhythm Sinus Bradycardia . 1030-labs redrawn and sent. 36- Dr. Sherif Howard made aware of HBG drop. New orders noted. 1215-several attempts made to place NG/OG tube by 2 nurses, unsuccessfully. 1250- patient glucose 55 on POC. Intensivist made aware. New orders noted. 1310-blood products started. 1530-Bedside and Verbal shift change report given to RIVERA Chavez RN (oncoming nurse) by Marcus Jurado RN (offgoing nurse). Report included the following information SBAR, Kardex, OR Summary, Procedure Summary, Intake/Output, MAR, Recent Results and Cardiac Rhythm sinus bebo.

## 2021-11-21 NOTE — ANESTHESIA PREPROCEDURE EVALUATION
Relevant Problems   No relevant active problems       Anesthetic History   No history of anesthetic complications            Review of Systems / Medical History  Patient summary reviewed, nursing notes reviewed and pertinent labs reviewed    Pulmonary  Within defined limits        Shortness of breath         Neuro/Psych   Within defined limits           Cardiovascular  Within defined limits                Exercise tolerance: >4 METS     GI/Hepatic/Renal  Within defined limits              Endo/Other        Anemia     Other Findings   Comments: Large substernal goiter, deviating trachea  Mass in the lung           Physical Exam    Airway  Mallampati: III  TM Distance: 4 - 6 cm  Neck ROM: normal range of motion   Mouth opening: Normal     Cardiovascular  Regular rate and rhythm,  S1 and S2 normal,  no murmur, click, rub, or gallop             Dental  No notable dental hx       Pulmonary  Breath sounds clear to auscultation    Decreased breath sounds: bilateral      Prolonged expiration and stridor     Abdominal  GI exam deferred       Other Findings            Anesthetic Plan    ASA: 4, emergent  Anesthesia type: MAC          Induction: Intravenous  Anesthetic plan and risks discussed with: Patient      Extensive discussion with family, daughter and grand daughter. Pt has a huge goiter deviating the trachea and is SOB needing 5 L O2 to maintain sats above 92. High risk situation and extensive discussion with family of plan to try an awake intubation with lidocaine and precedex. ICU MD is the primary and I will be assisting him. We could not get the OR schedule to insert him as primary so the case is being done under my name, but the ICU MD is the primary and I an just helping him. ENT  Consulted but they say there is no surgical indication in thi s situation with the large goiter hence ENT is not  available in the OR. Family is aware of this and agrees to proceed. I have had a clear discussion about the risks involved including death, loss of airway, MI and Stroke and family wishes to proceed.

## 2021-11-21 NOTE — PROGRESS NOTES
2015: TRANSFER - IN REPORT:    Verbal report received from Kota morgan (name) on Krish Wu  being received from Jazmín Tillson (unit) for routine progression of care      Report consisted of patients Situation, Background, Assessment and   Recommendations(SBAR). Information from the following report(s) SBAR, Kardex, Intake/Output and MAR was reviewed with the receiving nurse. Opportunity for questions and clarification was provided. Assessment completed upon patients arrival to unit and care assumed. 0330: Pt to CT with RN and RT.     0415: Pt back from CT. VSS.     0730: Bedside shift change report given to Anna Novak (oncoming nurse) by Kota morgan  (offgoing nurse). Report included the following information SBAR, Kardex, Intake/Output and MAR.

## 2021-11-21 NOTE — PROGRESS NOTES
NAME: Cj Kirkpatrick        :  1935        MRN:  [id number]                     Assessment   :                                               Plan:  VÍCTOR  Severe hypercalcemia  Primary hyperparathyroidism? Vs Parathyroid carcinoma  Massive goiter -> closing airway  Acute resp failure-> intubated   HTN-> now hypotensive  Sepsis-> PNA  Anemia  Hypokalemia   Creatinine 2.3 to 1.7-1.9mg/dl and holding but now bump back up to 2.4mg/dl likely from DARRIN vs ATN (sepsis/hypotension). Very high PTH (1720)- was c/w primary HPT. Serum Ca was improving with Sensipar . Repeat iPTH while on Sensipar 90mg BID down to 100. Calcium again working its way down. Sensipar held since intubation (can not technically be crushed and given via OGT)    Growing suspicion of parathyroid carcinoma given how high her IPTH levels were. CT neck showed massive multinodular goiter/thyromegaly. ENT following    Discussed case with patient's daughter yesterday  They have been resistant to make her DNR or consider more conservative approach. Wish to have everything done at this point. Her candidacy for any major surgical intervention will likely be limited given her advanced age. Discussed case with Dr. Tal Guillaume           Subjective:     Chief Complaint: Intubated yesterday. On vasopressor support. Getting PRBC. On D5W for hypoglycemia    Review of Systems:    Symptom Y/N Comments  Symptom Y/N Comments   Fever/Chills    Chest Pain     Poor Appetite    Edema     Cough    Abdominal Pain     Sputum    Joint Pain     SOB/ARIZA    Pruritis/Rash     Nausea/vomit    Tolerating PT/OT     Diarrhea    Tolerating Diet     Constipation    Other       Could not obtain due to:      Objective:     VITALS:   Last 24hrs VS reviewed since prior progress note.  Most recent are:  Visit Vitals  BP (!) (P) 118/58 (BP 1 Location: Right arm)   Pulse 61   Temp 98 °F (36.7 °C)   Resp 15   Ht 5' 6\" (1.676 m)   Wt 56.8 kg (125 lb 3.5 oz)   SpO2 100%   BMI 20.21 kg/m²       Intake/Output Summary (Last 24 hours) at 11/21/2021 1448  Last data filed at 11/21/2021 1000  Gross per 24 hour   Intake 2798.79 ml   Output 390 ml   Net 2408.79 ml      Telemetry Reviewed:     PHYSICAL EXAM:  General: Intubated/Sedated  Massive goiter  No edema      Lab Data Reviewed: (see below)    Medications Reviewed: (see below)    PMH/SH reviewed - no change compared to H&P  ________________________________________________________________________  Care Plan discussed with:  Patient     Family      RN     Care Manager                    Consultant:          Comments   >50% of visit spent in counseling and coordination of care       ________________________________________________________________________  Ayleen Zaidi MD     Procedures: see electronic medical records for all procedures/Xrays and details which  were not copied into this note but were reviewed prior to creation of Plan. LABS:  Recent Labs     11/21/21  1023 11/20/21  1809   WBC 13.8* 11.7*   HGB 6.6* 9.3*   HCT 20.2* 28.5*    212     Recent Labs     11/21/21  0609 11/20/21  1809 11/20/21  0412 11/20/21  0400 11/20/21  0400 11/19/21  0452 11/19/21  0452    138  --   --  134*   < > 132*   K 3.6 3.6  --   --  3.2*   < > 3.6   * 110*  --   --  104   < > 101   CO2 21 21  --   --  22   < > 22   BUN 48* 46*  --   --  40*   < > 36*   CREA 2.42* 2.23*  --   --  1.93*   < > 1.89*   GLU 61* 97  --   --  71   < > 101*   CA 11.3* 11.9* 13.0*   < > 12.9*   < > 15.1*   MG 1.8  --   --   --  1.9  --  1.4*   PHOS 2.4*  --   --   --  3.2  --  4.1    < > = values in this interval not displayed. Recent Labs     11/21/21  0609 11/20/21  1809 11/20/21  0400   AP 39* 51 54   TP 5.8* 6.2* 6.8   ALB 2.9* 2.4* 2.8*   GLOB 2.9 3.8 4.0     No results for input(s): INR, PTP, APTT, INREXT, INREXT in the last 72 hours.    No results for input(s): FE, TIBC, PSAT, FERR in the last 72 hours. No results found for: FOL, RBCF   No results for input(s): PH, PCO2, PO2 in the last 72 hours. No results for input(s): CPK, CKMB in the last 72 hours.     No lab exists for component: TROPONINI  No components found for: GLPOC  No results found for: COLOR, APPRN, SPGRU, REFSG, KATY, PROTU, GLUCU, KETU, BILU, UROU, IRASEMA, LEUKU, GLUKE, EPSU, BACTU, WBCU, RBCU, CASTS, UCRY    MEDICATIONS:  Current Facility-Administered Medications   Medication Dose Route Frequency    chlorhexidine (PERIDEX) 0.12 % mouthwash 15 mL  15 mL Oral Q12H    famotidine (PF) (PEPCID) 20 mg in 0.9% sodium chloride 10 mL injection  20 mg IntraVENous Q24H    lactated Ringers infusion  75 mL/hr IntraVENous CONTINUOUS    0.9% sodium chloride infusion 250 mL  250 mL IntraVENous PRN    dextrose 5% infusion  50 mL/hr IntraVENous CONTINUOUS    dextrose (D50W) injection syrg 25 g  25 g IntraVENous PRN    ampicillin-sulbactam (UNASYN) 1.5 g in 0.9% sodium chloride (MBP/ADV) 50 mL MBP  1.5 g IntraVENous Q12H    propofol (DIPRIVAN) 10 mg/mL infusion  0-50 mcg/kg/min IntraVENous TITRATE    PHENYLephrine (MIKE-SYNEPHRINE) 30 mg in 0.9% sodium chloride 250 mL infusion   mcg/min IntraVENous TITRATE    midazolam in normal saline (VERSED) 1 mg/mL infusion  0-10 mg/hr IntraVENous TITRATE    fentaNYL (PF) 1,500 mcg/30 mL (50 mcg/mL) infusion  0-200 mcg/hr IntraVENous TITRATE    cinacalcet (SENSIPAR) tablet 90 mg  90 mg Oral BID WITH MEALS    amLODIPine (NORVASC) tablet 10 mg  10 mg Oral DAILY    hydrALAZINE (APRESOLINE) 20 mg/mL injection 10 mg  10 mg IntraVENous Q6H PRN    rosuvastatin (CRESTOR) tablet 10 mg  10 mg Oral QHS    sodium chloride (NS) flush 5-40 mL  5-40 mL IntraVENous Q8H    sodium chloride (NS) flush 5-40 mL  5-40 mL IntraVENous PRN    acetaminophen (TYLENOL) tablet 650 mg  650 mg Oral Q6H PRN    Or    acetaminophen (TYLENOL) suppository 650 mg  650 mg Rectal Q6H PRN    polyethylene glycol (MIRALAX) packet 17 g  17 g Oral DAILY PRN    ondansetron (ZOFRAN ODT) tablet 4 mg  4 mg Oral Q8H PRN    Or    ondansetron (ZOFRAN) injection 4 mg  4 mg IntraVENous Q6H PRN    heparin (porcine) injection 5,000 Units  5,000 Units SubCUTAneous Q12H

## 2021-11-21 NOTE — PERIOP NOTES
1857- Pt received to OR by Vahe Barger and Andre Uriarte. LEONELA and Dr Pedro Pineda in [place] [address] and available for monitoring and airway support. Report received by Vahe Barger from Floor RN. Documentation support by Avery Ennis RN. Consent obtained from family members by Dr. Pedro Pineda and Dr. Мария Monk. Pt verbal but not understandable due to vocal and airway distress, Vital Signs Stable. 2010  Pt to CCU 25 With LEONELA Short, Jung[facility] [place] [facility] O2 via ambu bag  with mild assist of self respirations. Deja given report to CCU RN at bedside.

## 2021-11-21 NOTE — PROGRESS NOTES
TRANSFER - OUT REPORT:    Verbal report given to CCU RN(name) on Vernon Huddle  being transferred to CCU(unit) for change in patient condition(post-intubation from OR)       Report consisted of patients Situation, Background, Assessment and   Recommendations(SBAR). Information from the following report(s) SBAR, ED Summary, Intake/Output, MAR, Recent Results and Cardiac Rhythm SR was reviewed with the receiving nurse. Lines:   Peripheral IV 11/19/21 Distal; Left; Posterior Forearm (Active)   Site Assessment Clean, dry, & intact 11/20/21 1334   Phlebitis Assessment 0 11/20/21 1334   Infiltration Assessment 0 11/20/21 1334   Dressing Status Clean, dry, & intact 11/20/21 1334   Dressing Type Transparent 11/20/21 1334   Hub Color/Line Status Flushed; Patent; Infusing; [facility] 11/20/21 1334   Action Taken Open ports on tubing capped 11/20/21 1334   Alcohol Cap Used Yes 11/20/21 1334        Opportunity for questions and clarification was provided.       Patient transported with:   O2 @ 15 liters  Registered Nurse

## 2021-11-21 NOTE — PROGRESS NOTES
[facility]    ICU TEAM Progress Note    Name: Sheliah Kawasaki   : 1935   MRN: [id number]   Date: 2021           ICU Assessment     1. Large lower R-midline paratracheal mass  2. Airway obstruction d/t neck mass  a. Tracheal deviation  b. ?Massive Goiter  c. ?Parathyroid Carcinoma  3. Primary hypercalcemia/primary hyperparathyroidism  4. VÍCTOR  5. Pneumonia - aspiration/post-obstructive    Rapid COVID negative    6.0 ETT placed fiberoptically  in OR  CT neck/chest reviewed           ICU Comprehensive Plan of Care:     1. Neurological System  Spontaneous Awakening Trial: Yes  Sedation: Propofol, Versed and Fentanyl  Analgesia: Fentanyl    2. Cardiovascular System  SBP Goal of: > 90 mmHg  MAP Goal of: > 65 mmHg  Transfusion Trigger (Hgb): <7 g/dL  Keep K > 4; Mg > 2   Mateo for hypotension d/t sedation    3. Respiratory System  Head of bed > 30 degrees  Spontaneous Breathing Trial: No  SpO2 Goal: > 92%  DVT Prophylaxis: SCD's or Sequential Compression Device and Heparin   CPT, pulm toilet  Unasyn Day 2/x    4. Renal/GI/Endocrine System  IVFs: KVO  Ulcer Prophylaxis: Pepcid (famotidine)   Bowel Regimen: None needed at this time  Feeding:  Pending - place OGT, start TFs  Blood Sugar Goal 120-180 - Glycemic Control: Insulin    5. Infectious Disease  Indwelling Catheter:  Tubes: ETT  Lines: Peripheral IV and midline  Drains: None    6. PT/OT: pending     7. Goals of Care Discussion with family Yes     8. Plan of Care/Code Status: Full Code    9. Discussed Care Plan with Bedside RN    10.  Documentation of Current Medications    Subjective:   Progress Note: 2021      Reason for ICU Admission: Airway compromise     HPI:  (from HPI HM note as patient having some orientation issues)    This is an 50-year-old female who presented with fatigue, weakness, constipation and some transient confusion.  She was seen day before admission by her primary physician is Dr. Pierce Holden from [facility]. Miroslava Smyth had some blood drawn for routine and Dr. Carlos Bryant called the daughter on day of admission and told her that the calcium was extremely high and she needed to come to the hospital. Lazara Joe has had no problems with chest pain, shortness of breath, nausea or vomiting and no other GI or  symptoms noted.  Patient is alert and oriented currently with no acute complaints.   Per pt, noticed lost weight last several years \" previous pants and clothes all too large for her now\" . Denied dysphagia. Denied voice changes.  No history of cancer.  .     Was called by  Team, Dr. Ana Laura Nelson, due to respiratory insufficiency and possible airway compromise. During patients MRI today on 11/20, patient had difficult time, lying flat, and MRI showed paratracheal mass with some extension into the chest cavity. ENT on board, would like a CT neck/chest with contrast, to determine more about the pathology - patient needs intubated for this study. Overnight Events:   11/20 -- Admitted to ICU  11/21 -- No major o/n events reported    Active Problem List:     Problem List  Date Reviewed: 11/14/2021          Codes Class    Thyroid mass ICD-10-CM: E07.9  ICD-9-CM: 246.9         Respiratory failure with hypoxia (HCC) ICD-10-CM: J96.91  ICD-9-CM: 518.81         * (Principal) Hypercalcemia ICD-10-CM: U86.58  ICD-9-CM: 275.42         Lung mass ICD-10-CM: R91.8  ICD-9-CM: 786.6               Past Medical History:      has no past medical history on file. Past Surgical History:      has no past surgical history on file. Home Medications:     Prior to Admission medications    Medication Sig Start Date End Date Taking? Authorizing Provider   chlorthalidone (HYGROTON) 25 mg tablet Take 25 mg by mouth daily. Yes Provider, Historical   amLODIPine (Norvasc) 5 mg tablet Take 5 mg by mouth daily. Yes Provider, Historical   rosuvastatin (CRESTOR) 10 mg tablet Take 10 mg by mouth nightly.    Yes Provider, Historical       Allergies/Social/Family History: No Known Allergies   Social History     Tobacco Use    Smoking status: Not on file    Smokeless tobacco: Not on file   Substance Use Topics    Alcohol use: Not on file      No family history on file. Review of Systems:     Review of systems not obtained due to patient factors. Objective:   Vital Signs:  Visit Vitals  BP (!) 111/49   Pulse 63   Temp 97.8 °F (36.6 °C)   Resp 16   Ht 5' 6\" (1.676 m)   Wt 57.3 kg (126 lb 6.4 oz)   SpO2 100%   BMI 20.40 kg/m²    O2 Flow Rate (L/min): 15 l/min O2 Device: Ventilator, Endotracheal tube Temp (24hrs), Av.6 °F (36.4 °C), Min:97.5 °F (36.4 °C), Max:97.8 °F (36.6 °C)           Intake/Output:     Intake/Output Summary (Last 24 hours) at 2021 0704  Last data filed at 2021 5158  Gross per 24 hour   Intake 3436.79 ml   Output 760 ml   Net 2676.79 ml       Physical Exam:    General appearance: NAD, appears stated age  Head: Normocephalic, without obvious abnormality, atraumatic  Eyes: negative  Nose: Nares normal. Septum midline. Throat: ETT, OGT, Lips, mucosa, and tongue normal. Teeth and gums normal  Lungs: clear to auscultation bilaterally  Heart: regular rate and rhythm, S1, S2 normal, no murmur, click, rub or gallop  Abdomen: soft, non-tender.  Bowel sounds normal. No masses,  no organomegaly  Extremities: extremities normal, atraumatic, no cyanosis or edema  Pulses: 2+ and symmetric  Neurologic: Grossly normal    LABS AND  DATA: Personally reviewed  Recent Labs     21   WBC 11.7* 12.2*   HGB 9.3* 9.5*   HCT 28.5* 29.2*    213     Recent Labs     21  0412 21  0400 21  0400 21  0452 21  0452     --   --  134*   < > 132*   K 3.6  --   --  3.2*   < > 3.6   *  --   --  104   < > 101   CO2 21  --   --  22   < > 22   BUN 46*  --   --  40*   < > 36*   CREA 2.23*  --   --  1.93*   < > 1.89*   GLU 97  --   --  71   < > 101*   CA 11.9* 13.0*   < > 12.9*   < > 15.1*   MG --   --   --  1.9  --  1.4*   PHOS  --   --   --  3.2  --  4.1    < > = values in this interval not displayed. Recent Labs     11/20/21  1809 11/20/21  0400   AP 51 54   TP 6.2* 6.8   ALB 2.4* 2.8*   GLOB 3.8 4.0     No results for input(s): INR, PTP, APTT, INREXT, INREXT in the last 72 hours. Recent Labs     11/20/21  2247 11/20/21  0938   PHI 7.40 7.36   PCO2I 33.0* 36.9   PO2I 101* 81   FIO2I 55  --      No results for input(s): CPK, CKMB, TROIQ, BNPP in the last 72 hours. Hemodynamics:   PAP:   CO:     Wedge:   CI:     CVP:    SVR:       PVR:       Ventilator Settings:  Mode Rate Tidal Volume Pressure FiO2 PEEP   Assist control, Volume control   450 ml    50 % 5 cm H20     Peak airway pressure: 18 cm H2O    Minute ventilation: 7.38 l/min        MEDS: Reviewed    Chest X-Ray:  CXR Results  (Last 48 hours)               11/21/21 0529  XR CHEST PORT Final result    Impression:      Endotracheal tube is now in good position. Otherwise, no change since recent CT,   see that report. Narrative:  EXAM: XR CHEST PORT       INDICATION: Endotracheal tube adjustment. COMPARISON: CT chest 1 hour prior       TECHNIQUE: Semiupright portable chest AP view       FINDINGS: Endotracheal tube now terminates 2.6 cm proximal to the lori, in   good position. Otherwise, no change. No pneumothorax. 11/20/21 2044  XR CHEST PORT Final result    Impression:      Endotracheal tube is in good position. No pneumothorax. New bilateral lower lobe   atelectasis versus aspiration pneumonia. Narrative:  EXAM: XR CHEST PORT       INDICATION: Respiratory distress, intubation. COMPARISON: CT chest on 11/13/2021. Portable chest on 11/20/2021 and 11/13/2021. TECHNIQUE: Semiupright portable chest AP view       FINDINGS: Endotracheal tube extends to within 1 cm of the lori. Cardiac   monitoring wires overlie the thorax. Thyromegaly is unchanged.  Left deviation of   the trachea, which is narrowed is unchanged. Bilateral lower lobe perihilar opacities are new. No pneumothorax. Bones are   osteopenic.           11/20/21 0939  XR CHEST PORT Final result    Impression:  1. No acute disease           Narrative:  INDICATION:  hypoxia        Exam: Portable chest 11/13/2021. Comparison: Comparison prior chest CT. Findings: Cardiac silhouette is not enlarged. Soft tissue density in the right   superior mediastinum deviating the trachea to the left is compatible with a   large goiter. Pulmonary vasculature is not engorged. There are no focal   parenchymal opacities, effusions, or pneumothorax. Multidisciplinary Rounds Completed: Yes    ABCDEF Bundle/Checklist Completed:  Yes    SPECIAL EQUIPMENT  None    DISPOSITION  Stay in ICU    CRITICAL CARE CONSULTANT NOTE  I had a face to face encounter with the patient, reviewed and interpreted patient data including clinical events, labs, images, vital signs, I/O's, and examined patient. I have discussed the case and the plan and management of the patient's care with the consulting services, the bedside nurses and the respiratory therapist.      NOTE OF PERSONAL INVOLVEMENT IN CARE   This patient has a high probability of imminent, clinically significant deterioration, which requires the highest level of preparedness to intervene urgently. I participated in the decision-making and personally managed or directed the management of the following life and organ supporting interventions that required my frequent assessment to treat or prevent imminent deterioration. I personally spent 31 minutes of critical care time. This is time spent at this critically ill patient's bedside actively involved in patient care as well as the coordination of care. This does not include any procedural time which has been billed separately.     Biju Dee,    Staff [facility]  11/21/2021

## 2021-11-22 LAB
ABO + RH BLD: NORMAL
ALBUMIN SERPL-MCNC: 2.6 G/DL (ref 3.5–5)
ALBUMIN/GLOB SERPL: 0.8 {RATIO} (ref 1.1–2.2)
ALP SERPL-CCNC: 52 U/L (ref 45–117)
ALT SERPL-CCNC: 25 U/L (ref 12–78)
ANION GAP SERPL CALC-SCNC: 9 MMOL/L (ref 5–15)
APTT PPP: 42.1 SEC (ref 22.1–31)
AST SERPL-CCNC: 20 U/L (ref 15–37)
BILIRUB DIRECT SERPL-MCNC: 0.2 MG/DL (ref 0–0.2)
BILIRUB SERPL-MCNC: 0.4 MG/DL (ref 0.2–1)
BLD PROD TYP BPU: NORMAL
BLOOD GROUP ANTIBODIES SERPL: NORMAL
BPU ID: NORMAL
BUN SERPL-MCNC: 55 MG/DL (ref 6–20)
BUN/CREAT SERPL: 22 (ref 12–20)
CALCIUM SERPL-MCNC: 10.9 MG/DL (ref 8.5–10.1)
CEA SERPL-MCNC: 0.4 NG/ML
CHLORIDE SERPL-SCNC: 110 MMOL/L (ref 97–108)
CO2 SERPL-SCNC: 20 MMOL/L (ref 21–32)
CREAT SERPL-MCNC: 2.49 MG/DL (ref 0.55–1.02)
CROSSMATCH RESULT,%XM: NORMAL
ERYTHROCYTE [DISTWIDTH] IN BLOOD BY AUTOMATED COUNT: 13.1 % (ref 11.5–14.5)
GLOBULIN SER CALC-MCNC: 3.3 G/DL (ref 2–4)
GLUCOSE SERPL-MCNC: 81 MG/DL (ref 65–100)
HCT VFR BLD AUTO: 24.9 % (ref 35–47)
HGB BLD-MCNC: 8.2 G/DL (ref 11.5–16)
INR PPP: 1.1 (ref 0.9–1.1)
MAGNESIUM SERPL-MCNC: 1.8 MG/DL (ref 1.6–2.4)
MCH RBC QN AUTO: 29.1 PG (ref 26–34)
MCHC RBC AUTO-ENTMCNC: 32.9 G/DL (ref 30–36.5)
MCV RBC AUTO: 88.3 FL (ref 80–99)
NRBC # BLD: 0 K/UL (ref 0–0.01)
NRBC BLD-RTO: 0 PER 100 WBC
PHOSPHATE SERPL-MCNC: 2.3 MG/DL (ref 2.6–4.7)
PLATELET # BLD AUTO: 181 K/UL (ref 150–400)
PMV BLD AUTO: 11.6 FL (ref 8.9–12.9)
POTASSIUM SERPL-SCNC: 3.1 MMOL/L (ref 3.5–5.1)
PROT SERPL-MCNC: 5.9 G/DL (ref 6.4–8.2)
PROTHROMBIN TIME: 11.4 SEC (ref 9–11.1)
RBC # BLD AUTO: 2.82 M/UL (ref 3.8–5.2)
SODIUM SERPL-SCNC: 139 MMOL/L (ref 136–145)
SPECIMEN EXP DATE BLD: NORMAL
STATUS OF UNIT,%ST: NORMAL
THERAPEUTIC RANGE,PTTT: ABNORMAL SECS (ref 58–77)
UNIT DIVISION, %UDIV: 0
WBC # BLD AUTO: 12.6 K/UL (ref 3.6–11)

## 2021-11-22 PROCEDURE — 76937 US GUIDE VASCULAR ACCESS: CPT

## 2021-11-22 PROCEDURE — C1769 GUIDE WIRE: HCPCS

## 2021-11-22 PROCEDURE — 82378 CARCINOEMBRYONIC ANTIGEN: CPT

## 2021-11-22 PROCEDURE — 74011250636 HC RX REV CODE- 250/636: Performed by: INTERNAL MEDICINE

## 2021-11-22 PROCEDURE — 77030020365 HC SOL INJ SOD CL 0.9% 50ML

## 2021-11-22 PROCEDURE — 80048 BASIC METABOLIC PNL TOTAL CA: CPT

## 2021-11-22 PROCEDURE — 84100 ASSAY OF PHOSPHORUS: CPT

## 2021-11-22 PROCEDURE — 85027 COMPLETE CBC AUTOMATED: CPT

## 2021-11-22 PROCEDURE — 74011000250 HC RX REV CODE- 250: Performed by: INTERNAL MEDICINE

## 2021-11-22 PROCEDURE — 02HV33Z INSERTION OF INFUSION DEVICE INTO SUPERIOR VENA CAVA, PERCUTANEOUS APPROACH: ICD-10-PCS | Performed by: RADIOLOGY

## 2021-11-22 PROCEDURE — 85610 PROTHROMBIN TIME: CPT

## 2021-11-22 PROCEDURE — 74011000258 HC RX REV CODE- 258: Performed by: INTERNAL MEDICINE

## 2021-11-22 PROCEDURE — 36573 INSJ PICC RS&I 5 YR+: CPT | Performed by: ANESTHESIOLOGY

## 2021-11-22 PROCEDURE — C1751 CATH, INF, PER/CENT/MIDLINE: HCPCS

## 2021-11-22 PROCEDURE — 85730 THROMBOPLASTIN TIME PARTIAL: CPT

## 2021-11-22 PROCEDURE — 80076 HEPATIC FUNCTION PANEL: CPT

## 2021-11-22 PROCEDURE — 83735 ASSAY OF MAGNESIUM: CPT

## 2021-11-22 PROCEDURE — 74011250636 HC RX REV CODE- 250/636: Performed by: HOSPITALIST

## 2021-11-22 PROCEDURE — C1894 INTRO/SHEATH, NON-LASER: HCPCS

## 2021-11-22 PROCEDURE — 65620000000 HC RM CCU GENERAL

## 2021-11-22 PROCEDURE — 36415 COLL VENOUS BLD VENIPUNCTURE: CPT

## 2021-11-22 PROCEDURE — 94003 VENT MGMT INPAT SUBQ DAY: CPT

## 2021-11-22 PROCEDURE — 82308 ASSAY OF CALCITONIN: CPT

## 2021-11-22 RX ORDER — SODIUM,POTASSIUM PHOSPHATES 280-250MG
1 POWDER IN PACKET (EA) ORAL 2 TIMES DAILY
Status: DISCONTINUED | OUTPATIENT
Start: 2021-11-22 | End: 2021-11-22

## 2021-11-22 RX ORDER — NOREPINEPHRINE BITARTRATE/D5W 8 MG/250ML
.5-3 PLASTIC BAG, INJECTION (ML) INTRAVENOUS
Status: DISCONTINUED | OUTPATIENT
Start: 2021-11-22 | End: 2021-11-25 | Stop reason: HOSPADM

## 2021-11-22 RX ADMIN — FAMOTIDINE 20 MG: 10 INJECTION INTRAVENOUS at 04:17

## 2021-11-22 RX ADMIN — Medication 10 ML: at 22:27

## 2021-11-22 RX ADMIN — Medication 10 ML: at 14:30

## 2021-11-22 RX ADMIN — Medication 10 ML: at 05:39

## 2021-11-22 RX ADMIN — HEPARIN SODIUM 5000 UNITS: 5000 INJECTION INTRAVENOUS; SUBCUTANEOUS at 04:17

## 2021-11-22 RX ADMIN — HEPARIN SODIUM 5000 UNITS: 5000 INJECTION INTRAVENOUS; SUBCUTANEOUS at 16:30

## 2021-11-22 RX ADMIN — DEXTROSE MONOHYDRATE 50 ML/HR: 50 INJECTION, SOLUTION INTRAVENOUS at 11:28

## 2021-11-22 RX ADMIN — FENTANYL CITRATE 75 MCG/HR: 50 INJECTION INTRAVENOUS at 22:25

## 2021-11-22 RX ADMIN — NOREPINEPHRINE BITARTRATE 2 MCG/MIN: 1 SOLUTION INTRAVENOUS at 01:30

## 2021-11-22 RX ADMIN — SODIUM CHLORIDE 1.5 G: 900 INJECTION INTRAVENOUS at 17:34

## 2021-11-22 RX ADMIN — CHLORHEXIDINE GLUCONATE 15 ML: 1.2 RINSE ORAL at 08:17

## 2021-11-22 RX ADMIN — SODIUM CHLORIDE 1.5 G: 900 INJECTION INTRAVENOUS at 05:39

## 2021-11-22 RX ADMIN — CHLORHEXIDINE GLUCONATE 15 ML: 1.2 RINSE ORAL at 22:28

## 2021-11-22 RX ADMIN — MIDAZOLAM 2 MG/HR: 5 INJECTION INTRAMUSCULAR; INTRAVENOUS at 15:38

## 2021-11-22 NOTE — PROGRESS NOTES
Physical Therapy - defer     Events of weekend noted. Chart reviewed, patient received sedated and on vent. Per ABCDEF protocol, will work with patient when PEEP is 10.0 or less, FIO2 60% or less, and patient is following basic commands.  Will follow patient peripherally.      Recommend nursing to complete with patient, as able and per protocol, in order to promote cardiopulmonary systems, maintain strength, endurance and independence:   -bed in chair position or maximize full reverse Trendelenburg position to facilitate upright activity with foot board and non-skid footwear on 3x/day ~30-60 mins each   -ROM during bathing B UEs and LEs  -positioning to prevent contractures and edema  RASS -1/0/+1 (during SAT) · Active ROM  · Sitting (bed in chair position)  · Standing (reverse Trendelenburg)  · ADLs   RASS -3/2 · Passive ROM  · Sit (bed in chair position)   RASS -5/-4 · Passive ROM         Thank you for your assistance.

## 2021-11-22 NOTE — PROGRESS NOTES
SLP Contact Note    Noted pt is now intubated. Will hold SLP for now.        Thank you,  LINH BeattyEd, [id number] [facility]  Speech-Language Pathologist

## 2021-11-22 NOTE — PROCEDURES
[facility]      Procedure Note - Intubation:   Performed by Nate Campoverde MD  Staff Anesthesiologist/Intensivist    Diagnosis: Acute Resp Failure with Paratracheal Mass  Obtained Consent? yes; informed   Procedure Location:  OR. Immediately prior to the procedure, the patient was reevaluated and found suitable for the planned procedure and any planned medications. Immediately prior to the procedure a time out was called to verify the correct patient, procedure, equipment, staff, and marking as appropriate. Medications given were topical lidocaine, cetacaine, precedex, glycopyrolate. A number 6.0 cuffed   ETT was placed to 23 cm at the teeth. Placement was evaluated by noting bilateral, symmetric breath sounds, good end-tidal CO2 detector color change  and limited fiberoptic bronchoscopy. Attempts required: 1. Complications: none. AFOI with above sedation. Stauffer inserted by Dr. Lena Hernandez, I then used this view to get the fiberoptic scope at the vocal cords, then Dr. Lena Hernandez assisted forwarding the scope threw the vocal cords then sliding the ETT into the trachea. The procedure was tolerated well.

## 2021-11-22 NOTE — PROGRESS NOTES
0730: Bedside shift change report given to Dorita Anthony RN (oncoming nurse) by Jorge Barraza RN (offgoing nurse). Report included the following information SBAR, Kardex, ED Summary, OR Summary, Procedure Summary, Intake/Output, MAR, Recent Results, Cardiac Rhythm SB/NSR and Dual Neuro Assessment. 9745: Vascular access team @ the bedside for R PICC insertion. 0900: Dr Herb Burnham, nephrology, assessing pt @ the bedside. No new orders were received     1000: Interdisciplinary rounds were held @ the bedside. 1015: Vascular access team unable to establish PICC in [facility].     1700: Dr Laura Velazquez, ENT, assessing pt @ the bedside. 1715: RN called US concerning FNA order. RN notified that radiology MD left note saying preferred not to do while pt is intubated. Tech requested to reach back out to MD, as pt needs to remain intubated. 1930: Bedside shift change report given to Jorge Barraza RN (oncoming nurse) by Dorita Anthony RN (offgoing nurse). Report included the following information SBAR, Kardex, ED Summary, OR Summary, Procedure Summary, Intake/Output, MAR, Recent Results, Cardiac Rhythm NSR and Dual Neuro Assessment.

## 2021-11-22 NOTE — PROGRESS NOTES
Occupational Therapy    Events of weekend noted. Chart reviewed, patient received sedated and on vent. Per ABCDEF protocol, will work with patient when PEEP is 10.0 or less, FIO2 60% or less, and patient is following basic commands. Will follow patient peripherally. Recommend nursing to complete with patient, as able and per protocol, in order to promote cardiopulmonary systems, maintain strength, endurance and independence:   -bed in chair position or maximize full reverse Trendelenburg position to facilitate upright activity with foot board and non-skid footwear on 3x/day ~30-60 mins each   -ROM during bathing B UEs and LEs  -positioning to prevent contractures and edema  RASS -1/0/+1 (during SAT)  Active ROM   Sitting (bed in chair position)   Standing (reverse Trendelenburg)   ADLs   RASS -3/2  Passive ROM   Sit (bed in chair position)   RASS -5/-4  Passive ROM       Thank you for your assistance.

## 2021-11-22 NOTE — PROGRESS NOTES
Attempted left brachial ,unable to pass 38 cm PICC pass 25cm archie despite flushing and repositioning techniques. Case aborted . RN made aware.

## 2021-11-22 NOTE — PROGRESS NOTES
[facility]    ICU TEAM Progress Note    Name: Genaro Thomas   : 1935   MRN: [id number]   Date: 2021           ICU Assessment     1. Large lower R-midline paratracheal mass  2. Airway obstruction d/t neck mass  a. Tracheal deviation  b. ?Massive Goiter  c. ?Parathyroid Carcinoma  3. Primary hypercalcemia/primary hyperparathyroidism  4. VÍCTOR  5. Pneumonia - aspiration/post-obstructive    Rapid COVID negative    6.0 ETT placed fiberoptically  in OR  CT neck/chest reviewed  Spoke with Dr. Wiliam Daniels ([facility]) - she plans to discuss case with Dr. Mackenzie Castellano for FNA of L thyroid nodule - order placed         ICU Comprehensive Plan of Care:     1. Neurological System  Spontaneous Awakening Trial: Yes  Sedation: Propofol, Versed and Fentanyl  Analgesia: Fentanyl    2. Cardiovascular System  SBP Goal of: > 90 mmHg  MAP Goal of: > 65 mmHg  Transfusion Trigger (Hgb): <7 g/dL  Keep K > 4; Mg > 2   Mateo for hypotension d/t sedation    3. Respiratory System  Head of bed > 30 degrees  Spontaneous Breathing Trial: No  SpO2 Goal: > 92%  DVT Prophylaxis: SCD's or Sequential Compression Device and Heparin   CPT, pulm toilet  Unasyn Day 2/x    4. Renal/GI/Endocrine System  IVFs: KVO  Ulcer Prophylaxis: Pepcid (famotidine)   Bowel Regimen: None needed at this time  Feeding:  Pending - place OGT, start TFs  Blood Sugar Goal 120-180 - Glycemic Control: Insulin    5. Infectious Disease  Indwelling Catheter:  Tubes: ETT  Lines: Peripheral IV and midline  Drains: None    6. PT/OT: pending     7. Goals of Care Discussion with family Yes     8. Plan of Care/Code Status: Full Code    9. Discussed Care Plan with Bedside RN    10.  Documentation of Current Medications    Subjective:   Progress Note: 2021      Reason for ICU Admission: Airway compromise     HPI:  (from HPI HM note as patient having some orientation issues)    This is an 27-year-old female who presented with fatigue, weakness, constipation and some transient confusion.  She was seen day before admission by her primary physician is Dr. Pierce Holden from [facility] had some blood drawn for routine and Dr. Pierce Holden called the daughter on day of admission and told her that the calcium was extremely high and she needed to come to the hospital. Miroslava Smyth has had no problems with chest pain, shortness of breath, nausea or vomiting and no other GI or  symptoms noted.  Patient is alert and oriented currently with no acute complaints.   Per pt, noticed lost weight last several years \" previous pants and clothes all too large for her now\" . Denied dysphagia. Denied voice changes.  No history of cancer.  .     Was called by  Team, Dr. Chelo Silva, due to respiratory insufficiency and possible airway compromise. During patients MRI today on 11/20, patient had difficult time, lying flat, and MRI showed paratracheal mass with some extension into the chest cavity. ENT on board, would like a CT neck/chest with contrast, to determine more about the pathology - patient needs intubated for this study. Overnight Events:   11/20 -- Admitted to ICU  11/21 -- No major o/n events reported  11/22 -- Unable to place PICC due to vessel compression, on MV, RASS     Active Problem List:     Problem List  Date Reviewed: 11/14/2021          Codes Class    Thyroid mass ICD-10-CM: E07.9  ICD-9-CM: 246.9         Respiratory failure with hypoxia (HCC) ICD-10-CM: J96.91  ICD-9-CM: 518.81         * (Principal) Hypercalcemia ICD-10-CM: E60.29  ICD-9-CM: 275.42         Lung mass ICD-10-CM: R91.8  ICD-9-CM: 786.6               Past Medical History:      has no past medical history on file. Past Surgical History:      has no past surgical history on file. Home Medications:     Prior to Admission medications    Medication Sig Start Date End Date Taking? Authorizing Provider   chlorthalidone (HYGROTON) 25 mg tablet Take 25 mg by mouth daily.    Yes Provider, Historical amLODIPine (Norvasc) 5 mg tablet Take 5 mg by mouth daily. Yes Provider, Historical   rosuvastatin (CRESTOR) 10 mg tablet Take 10 mg by mouth nightly. Yes Provider, Historical       Allergies/Social/Family History:     No Known Allergies   Social History     Tobacco Use    Smoking status: Not on file    Smokeless tobacco: Not on file   Substance Use Topics    Alcohol use: Not on file      No family history on file. Review of Systems:     Review of systems not obtained due to patient factors. Objective:   Vital Signs:  Visit Vitals  BP (!) 121/49   Pulse (!) 59   Temp 97.1 °F (36.2 °C)   Resp 15   Ht 5' 6\" (1.676 m)   Wt 56.8 kg (125 lb 3.5 oz)   SpO2 100%   BMI 20.21 kg/m²    O2 Flow Rate (L/min): 15 l/min O2 Device: Endotracheal tube, Ventilator Temp (24hrs), Av.5 °F (36.4 °C), Min:97.1 °F (36.2 °C), Max:98 °F (36.7 °C)           Intake/Output:     Intake/Output Summary (Last 24 hours) at 2021 5001  Last data filed at 2021 0600  Gross per 24 hour   Intake 2112.44 ml   Output 635 ml   Net 1477.44 ml       Physical Exam:    General appearance: NAD, appears stated age  Head: Normocephalic, without obvious abnormality, atraumatic  Eyes: negative  Nose: Nares normal. Septum midline. Throat: ETT, OGT, Lips, mucosa, and tongue normal. Teeth and gums normal  Lungs: clear to auscultation bilaterally  Heart: regular rate and rhythm, S1, S2 normal, no murmur, click, rub or gallop  Abdomen: soft, non-tender. Bowel sounds normal. No masses,  no organomegaly  Extremities: extremities normal, atraumatic, no cyanosis or edema  Pulses: 2+ and symmetric  Neurologic: Grossly normal    LABS AND  DATA: Personally reviewed  Recent Labs     21  0418 21  1831 21  1023 21  1023   WBC 12.6*  --   --  13.8*   HGB 8.2* 8.2*   < > 6.6*   HCT 24.9* 25.1*   < > 20.2*     --   --  176    < > = values in this interval not displayed.      Recent Labs     21  0418 21  2827  138   K 3.1* 3.6   * 110*   CO2 20* 21   BUN 55* 48*   CREA 2.49* 2.42*   GLU 81 61*   CA 10.9* 11.3*   MG 1.8 1.8   PHOS 2.3* 2.4*     Recent Labs     11/22/21  0418 11/21/21  0609   AP 52 39*   TP 5.9* 5.8*   ALB 2.6* 2.9*   GLOB 3.3 2.9     Recent Labs     11/22/21  0418   INR 1.1   PTP 11.4*   APTT 42.1*      Recent Labs     11/20/21  2247 11/20/21  0938   PHI 7.40 7.36   PCO2I 33.0* 36.9   PO2I 101* 81   FIO2I 55  --      No results for input(s): CPK, CKMB, TROIQ, BNPP in the last 72 hours. Hemodynamics:   PAP:   CO:     Wedge:   CI:     CVP:    SVR:       PVR:       Ventilator Settings:  Mode Rate Tidal Volume Pressure FiO2 PEEP   Assist control, Volume control   450 ml    50 % 5 cm H20     Peak airway pressure: 23 cm H2O    Minute ventilation: 6.86 l/min        MEDS: Reviewed    Chest X-Ray:  CXR Results  (Last 48 hours)               11/21/21 0529  XR CHEST PORT Final result    Impression:      Endotracheal tube is now in good position. Otherwise, no change since recent CT,   see that report. Narrative:  EXAM: XR CHEST PORT       INDICATION: Endotracheal tube adjustment. COMPARISON: CT chest 1 hour prior       TECHNIQUE: Semiupright portable chest AP view       FINDINGS: Endotracheal tube now terminates 2.6 cm proximal to the lori, in   good position. Otherwise, no change. No pneumothorax. 11/20/21 2044  XR CHEST PORT Final result    Impression:      Endotracheal tube is in good position. No pneumothorax. New bilateral lower lobe   atelectasis versus aspiration pneumonia. Narrative:  EXAM: XR CHEST PORT       INDICATION: Respiratory distress, intubation. COMPARISON: CT chest on 11/13/2021. Portable chest on 11/20/2021 and 11/13/2021. TECHNIQUE: Semiupright portable chest AP view       FINDINGS: Endotracheal tube extends to within 1 cm of the lori. Cardiac   monitoring wires overlie the thorax. Thyromegaly is unchanged.  Left deviation of the trachea, which is narrowed is unchanged. Bilateral lower lobe perihilar opacities are new. No pneumothorax. Bones are   osteopenic.           11/20/21 0939  XR CHEST PORT Final result    Impression:  1. No acute disease           Narrative:  INDICATION:  hypoxia        Exam: Portable chest 11/13/2021. Comparison: Comparison prior chest CT. Findings: Cardiac silhouette is not enlarged. Soft tissue density in the right   superior mediastinum deviating the trachea to the left is compatible with a   large goiter. Pulmonary vasculature is not engorged. There are no focal   parenchymal opacities, effusions, or pneumothorax. Multidisciplinary Rounds Completed: Yes    ABCDEF Bundle/Checklist Completed:  Yes    SPECIAL EQUIPMENT  None    DISPOSITION  Stay in ICU    CRITICAL CARE CONSULTANT NOTE  I had a face to face encounter with the patient, reviewed and interpreted patient data including clinical events, labs, images, vital signs, I/O's, and examined patient. I have discussed the case and the plan and management of the patient's care with the consulting services, the bedside nurses and the respiratory therapist.      NOTE OF PERSONAL INVOLVEMENT IN CARE   This patient has a high probability of imminent, clinically significant deterioration, which requires the highest level of preparedness to intervene urgently. I participated in the decision-making and personally managed or directed the management of the following life and organ supporting interventions that required my frequent assessment to treat or prevent imminent deterioration. I personally spent 45 minutes of critical care time. This is time spent at this critically ill patient's bedside actively involved in patient care as well as the coordination of care. This does not include any procedural time which has been billed separately.     Leeroy Adame,    Staff [facility] Critical [facility]  11/22/2021

## 2021-11-22 NOTE — PROGRESS NOTES
Physician Progress Note      PATIENT:               Onesimo Walton  CSN #:                  [id number]  :                       1935  ADMIT DATE:       2021 1:03 PM  DISCH DATE:  RESPONDING  PROVIDER #:        GISELA JENKINS DO          QUERY TEXT:    Patient admitted with hypercalcemia. On  Annie Brooke MD Nephrology writes Sepsis Pneumonia. Attending documentation does not note Sepsis. Please indicate if you are treating Sepsis and include additional clinical indicators in your documentation. Or please document if the diagnosis of Sepsis has been ruled out after further study. The medical record reflects the following:    Risk Factors: 86F presents with large paratracheal mass, hypercalcemic, with complaints of weight loss. Aspiration noted on MBS, Elevated WBC 12.9  and admission date  and WBC WNL from -    Clinical Indicators:     ProCal 0.80 Lactic 0.9   WBC 12.9   WBC 13.8     98 77 16 160/51 100% RA   96.7F 80 16 180/70 87% RA   97.6F 82 18 137/70 91% RA   97.8F 81 18 122/59 100% Intubated on Ventilator   96.2F 56 15 96/45 100% Intubated on Ventilator     Mimi GRANGER  Creatinine 2.3 to 1.7-1.9mg/dl and holding but now bump back up to 2.4mg/dl likely from DARRIN vs ATN (sepsis/hypotension)  Sepsis-> PNA     Fredi GRANGER  Sepsis-> PNA     Medication Order  Mateo-Synephrine  mgc-min IV TITRATE  Ampicillin-sulbactam 1.5g IV q12 hours     Medication Order  Levophed 8mg in 5% Dex 0.5-30mcg/min IV TITRATE 0.9-56.3 mL/hr       CXR  IMPRESSION  Right paratracheal mass. Chest CT with IV contrast (venous phase, not PE  protocol) is recommended for further evaluation.  XR SWALLOW FUNC VIDEO  IMPRESSION  Aspiration of all consistencies. Weakness of the swallowing mechanism. Large  residue. See speech pathology report for details. 939 CXR  IMPRESSION  1.  No acute disease    2044 CXR  IMPRESSION  Endotracheal tube is in good position. No pneumothorax. New bilateral lower lobe  atelectasis versus aspiration pneumonia. Jackie Bob MD Otolaryngology  . Felix Reagan Now with aspiration pneumonia and intubated. Treatment: Dextose 5% IV 50 ml/hr 11/21-current, Bumate 25g IV ONCE 11/20 and 11/21, Levophed 8mg in 5% Dex 0.5-30mcg/min IV TITRATE 0.9-56.3 mL/hr, Mateo-Synephrine  mgc-min IV TITRATE , Ampicillin-sulbactam 1.5g IV q12 hours, NS 2L IV, Lab monitoring, CXR with repeat studies, CCU txfer and intubation    Thank you    Juliana Schmitt 386 Documentation Improvement Specialist  [phone]  Options provided:  -- Sepsis due to aspiration pneumonia, not POA, is present as evidenced by, Please document evidence. -- Sepsis was ruled out. Patient being treated for aspiration pneumonia not POA  -- Other - I will add my own diagnosis  -- Disagree - Not applicable / Not valid  -- Disagree - Clinically unable to determine / Unknown  -- Refer to Clinical Documentation Reviewer    PROVIDER RESPONSE TEXT:    Sepsis due to aspiration pneumonia, not POA, is present as evidenced by    Query created by: Anais Meza on 11/22/2021 2:13 PM      QUERY TEXT:    Patient admitted with hypercalcemia and paratracheal mass. Documentation states patient complains of weight loss. BMI noted to be 19.71 in an 81year old patient. RD consult was ordered and completed with findings and recommendations for care. Please note that the RD Consult was done 11/19. The patient aspirated all contents of her XR Swallow study and was made NPO  She then required intubation and is presently intubated and sedated in the ICU, NPO with D5% IV. Patient has not been reevaluated by RD since initial visit. If possible, please document in progress notes and discharge summary if you are evaluating and /or treating any of the following:     The medical record reflects the following:  Risk Factors: 86F with paratracheal mass, lives alone  Clinical Indicators:    11/15 Snidow RD Progress Note    Nutrition review completed for pt with low body wt/BMI  She was able to tell me her appetite has been poor  She is visually noted to have some mild/mod fat wasting noted to arms, temples, and clavicles    Malnutrition Assessment:  Malnutrition Status: Moderate malnutrition  Context:  Acute illness  Findings of the 6 clinical characteristics of malnutrition:  Energy Intake:  1 - 75% or less of est energy req for 7 or more days  Weight Loss:  Unable to assess  Body Fat Loss:  1 - Mild body fat loss, Buccal region, Triceps, Orbital  Muscle Mass Loss:  No significant muscle mass loss, Clavicles (pectoralis & deltoids), Temples (temporalis)  Fluid Accumulation:  No significant fluid accumulation,   Strength:  Not performed    Anthropometric Measures:  Height:  5' 6\" (167.6 cm)  Current Body Wt:  48.6 kg (107 lb 2.3 oz)  Admission Body Wt:  Ideal Body Wt:  130:  82.4 %  BMI Categories:  Underweight (BMI less than 22) age over 72  Wt Readings from Last 10 Encounters:  11/15/21 48.6 kg (107 lb 3.2 oz)  Nutrition Diagnosis:  Underweight related to inadequate protein-energy intake as evidenced by poor intake  Goals:  Improved PO intakes >50% daily meals. ?    Treatment: RD Consult, Modify diet to regular add once daily Ensure Enlive, Standing daily weights, continue to monitor and replete electrolytes as indicated, SLP with XR Swallow FUNC VIDEO      ASPEN Criteria:  https://aspenjournals. onlinelibrary. waller. com/doi/full/10.1177/0159244638368513    Thank you    Marge Barthel BSN RN CRCR  Clinical Documentation Improvement Specialist  [phone]  Options provided:  -- Protein calorie malnutrition moderate  -- Underweight with BMI 19.71 without malnutrition  -- Other - I will add my own diagnosis  -- Disagree - Not applicable / Not valid  -- Disagree - Clinically unable to determine / Unknown  -- Refer to Clinical Documentation Reviewer    PROVIDER RESPONSE TEXT:    This patient has moderate protein calorie malnutrition.     Query created by: Macey Crowder on 11/22/2021 2:29 PM      Electronically signed by:  Cole Díaz DO 11/22/2021 2:58 PM

## 2021-11-22 NOTE — INTERDISCIPLINARY ROUNDS
Multidisciplinary rounds were held 11/22/21. Today's plan/goal includes (but not limited to):  Work on transfer to [facility], establish PICC or CVC, nutrition, stable VS.

## 2021-11-22 NOTE — PROGRESS NOTES
2330: SBAR received from [address] [place] [address]. Drip rates verified    0100: Patient bradycardic HR mid 30s to low 40s. Spoke with MD. Will switch from Mateo to Levo. 0630: One of patients PIV leaking and removed. Patient with limited access and very hard stick. Notified MD and orders received for PICC team consult - orders placed. 0730: Bedside and Verbal shift change report given to [address] [place] [address]  (oncoming nurse) by Thuan Florez  (offgoing nurse). Report included the following information SBAR, Kardex, Intake/Output, MAR and Recent Results.

## 2021-11-22 NOTE — PROGRESS NOTES
Attempted to place PICC line on right side, unable to advance wire. Sabi Deluna RN attempted to place left sided PICC, unable to advance line beyond axillary region. Procedure aborted, RN made aware.

## 2021-11-22 NOTE — PROGRESS NOTES
RENAL  PROGRESS NOTE        Subjective:   picc being inserted;patient not seen  Objective:   VITALS SIGNS:    Visit Vitals  /61   Pulse 63   Temp (!) 96.2 °F (35.7 °C)   Resp 13   Ht 5' 6\" (1.676 m)   Wt 55.4 kg (122 lb 2.2 oz)   SpO2 100%   BMI 19.71 kg/m²       O2 Device: Endotracheal tube, Ventilator   O2 Flow Rate (L/min): 15 l/min   Temp (24hrs), Av.3 °F (36.3 °C), Min:96.2 °F (35.7 °C), Max:98 °F (36.7 °C)         PHYSICAL EXAM:  deferred    DATA REVIEW:     INTAKE / OUTPUT:   Last shift:       07 - 1900  In: 166 [I.V.:166]  Out: 55 [Urine:55]  Last 3 shifts: 1901 -  07  In: 4696.3 [I.V.:4540]  Out: 950 [Urine:950]    Intake/Output Summary (Last 24 hours) at 2021 1039  Last data filed at 2021 1000  Gross per 24 hour   Intake 2063.52 ml   Output 615 ml   Net 1448.52 ml         LABS:   Recent Labs     21  0418 21  1831 21  1023 21  18021  180   WBC 12.6*  --  13.8*  --  11.7*   HGB 8.2* 8.2* 6.6*   < > 9.3*   HCT 24.9* 25.1* 20.2*   < > 28.5*     --  176  --  212    < > = values in this interval not displayed. Recent Labs     21  0418 21  0609 21  1809 21  0412 21  0400    138 138   < > 134*   K 3.1* 3.6 3.6   < > 3.2*   * 110* 110*   < > 104   CO2 * 21 21   < > 22   GLU 81 61* 97   < > 71   BUN 55* 48* 46*   < > 40*   CREA 2.49* 2.42* 2.23*   < > 1.93*   CA 10.9* 11.3* 11.9*   < > 12.9*   MG 1.8 1.8  --   --  1.9   PHOS 2.3* 2.4*  --   --  3.2   ALB 2.6* 2.9* 2.4*   < > 2.8*   TBILI 0.4 0.5 0.4   < > 0.3   ALT 25 23 26   < > 26   INR 1.1  --   --   --   --     < > = values in this interval not displayed.              Assessment   :                                               Plan:  VÍCTOR  hypophosphatemia  Severe hypercalcemia    Ultra high PTH suspicion of Parathyroid carcinoma  Massive goiter -> closing airway  Acute resp failure-> intubated   HTN-> now hypotensive  Sepsis-> PNA  Anemia  Hypokalemia    Creatinine was holding but now bump back up    from DARRIN vs ATN (sepsis/hypotension).    Very high PTH (1720)-   T. Serum Ca was improving with Sensipar . Repeat iPTH while on Sensipar 90mg BID down to 100. Calcium again working its way down. Sensipar held since intubation (can not technically be crushed and given via OGT)       suspicion of parathyroid carcinoma given how high her IPTH levels were. CT neck showed massive multinodular goiter/thyromegaly. ENT following      Replace Phos    Might be going to [facility]?  FOR NECK SURGERY

## 2021-11-23 ENCOUNTER — APPOINTMENT (OUTPATIENT)
Dept: ULTRASOUND IMAGING | Age: 86
DRG: 643 | End: 2021-11-23
Attending: ANESTHESIOLOGY
Payer: MEDICARE

## 2021-11-23 LAB
ANION GAP SERPL CALC-SCNC: 8 MMOL/L (ref 5–15)
ARTERIAL PATENCY WRIST A: POSITIVE
ATRIAL RATE: 64 BPM
BASE DEFICIT BLD-SCNC: 4.8 MMOL/L
BDY SITE: ABNORMAL
BUN SERPL-MCNC: 55 MG/DL (ref 6–20)
BUN/CREAT SERPL: 21 (ref 12–20)
CALCIUM SERPL-MCNC: 10.4 MG/DL (ref 8.5–10.1)
CALCULATED P AXIS, ECG09: 60 DEGREES
CALCULATED R AXIS, ECG10: 8 DEGREES
CALCULATED T AXIS, ECG11: 91 DEGREES
CHLORIDE SERPL-SCNC: 108 MMOL/L (ref 97–108)
CO2 SERPL-SCNC: 17 MMOL/L (ref 21–32)
CREAT SERPL-MCNC: 2.65 MG/DL (ref 0.55–1.02)
DIAGNOSIS, 93000: NORMAL
ERYTHROCYTE [DISTWIDTH] IN BLOOD BY AUTOMATED COUNT: 13.5 % (ref 11.5–14.5)
GAS FLOW.O2 O2 DELIVERY SYS: ABNORMAL L/MIN
GAS FLOW.O2 SETTING OXYMISER: 15 BPM
GLUCOSE BLD STRIP.AUTO-MCNC: 70 MG/DL (ref 65–117)
GLUCOSE BLD STRIP.AUTO-MCNC: 85 MG/DL (ref 65–117)
GLUCOSE SERPL-MCNC: 61 MG/DL (ref 65–100)
HCO3 BLD-SCNC: 19.8 MMOL/L (ref 22–26)
HCT VFR BLD AUTO: 25.1 % (ref 35–47)
HGB BLD-MCNC: 7.6 G/DL (ref 11.5–16)
MAGNESIUM SERPL-MCNC: 1.6 MG/DL (ref 1.6–2.4)
MCH RBC QN AUTO: 29.1 PG (ref 26–34)
MCHC RBC AUTO-ENTMCNC: 30.3 G/DL (ref 30–36.5)
MCV RBC AUTO: 96.2 FL (ref 80–99)
NRBC # BLD: 0 K/UL (ref 0–0.01)
NRBC BLD-RTO: 0 PER 100 WBC
O2/TOTAL GAS SETTING VFR VENT: 40 %
P-R INTERVAL, ECG05: 202 MS
PAW @ MEAN EXP FLOW ON VENT: 10 CMH2O
PCO2 BLD: 33.4 MMHG (ref 35–45)
PEEP RESPIRATORY: 5 CMH2O
PH BLD: 7.38 [PH] (ref 7.35–7.45)
PHOSPHATE SERPL-MCNC: 1.9 MG/DL (ref 2.6–4.7)
PLATELET # BLD AUTO: 173 K/UL (ref 150–400)
PMV BLD AUTO: 11.5 FL (ref 8.9–12.9)
PO2 BLD: 128 MMHG (ref 80–100)
POTASSIUM SERPL-SCNC: 3.5 MMOL/L (ref 3.5–5.1)
Q-T INTERVAL, ECG07: 432 MS
QRS DURATION, ECG06: 92 MS
QTC CALCULATION (BEZET), ECG08: 445 MS
RBC # BLD AUTO: 2.61 M/UL (ref 3.8–5.2)
SAO2 % BLD: 98.9 % (ref 92–97)
SERVICE CMNT-IMP: NORMAL
SERVICE CMNT-IMP: NORMAL
SODIUM SERPL-SCNC: 133 MMOL/L (ref 136–145)
SPECIMEN TYPE: ABNORMAL
VENTILATION MODE VENT: ABNORMAL
VENTRICULAR RATE, ECG03: 64 BPM
VT SETTING VENT: 450 ML
WBC # BLD AUTO: 11.1 K/UL (ref 3.6–11)

## 2021-11-23 PROCEDURE — 83735 ASSAY OF MAGNESIUM: CPT

## 2021-11-23 PROCEDURE — 36600 WITHDRAWAL OF ARTERIAL BLOOD: CPT

## 2021-11-23 PROCEDURE — 88172 CYTP DX EVAL FNA 1ST EA SITE: CPT

## 2021-11-23 PROCEDURE — P9045 ALBUMIN (HUMAN), 5%, 250 ML: HCPCS | Performed by: INTERNAL MEDICINE

## 2021-11-23 PROCEDURE — 84100 ASSAY OF PHOSPHORUS: CPT

## 2021-11-23 PROCEDURE — 94003 VENT MGMT INPAT SUBQ DAY: CPT

## 2021-11-23 PROCEDURE — 77030014115

## 2021-11-23 PROCEDURE — 74011250636 HC RX REV CODE- 250/636: Performed by: ANESTHESIOLOGY

## 2021-11-23 PROCEDURE — 74011250636 HC RX REV CODE- 250/636: Performed by: INTERNAL MEDICINE

## 2021-11-23 PROCEDURE — 93005 ELECTROCARDIOGRAM TRACING: CPT

## 2021-11-23 PROCEDURE — 10005 FNA BX W/US GDN 1ST LES: CPT

## 2021-11-23 PROCEDURE — 82803 BLOOD GASES ANY COMBINATION: CPT

## 2021-11-23 PROCEDURE — P9045 ALBUMIN (HUMAN), 5%, 250 ML: HCPCS | Performed by: ANESTHESIOLOGY

## 2021-11-23 PROCEDURE — 74011000258 HC RX REV CODE- 258: Performed by: INTERNAL MEDICINE

## 2021-11-23 PROCEDURE — 88173 CYTOPATH EVAL FNA REPORT: CPT

## 2021-11-23 PROCEDURE — 80048 BASIC METABOLIC PNL TOTAL CA: CPT

## 2021-11-23 PROCEDURE — 80076 HEPATIC FUNCTION PANEL: CPT

## 2021-11-23 PROCEDURE — 82962 GLUCOSE BLOOD TEST: CPT

## 2021-11-23 PROCEDURE — 74011250636 HC RX REV CODE- 250/636

## 2021-11-23 PROCEDURE — 36415 COLL VENOUS BLD VENIPUNCTURE: CPT

## 2021-11-23 PROCEDURE — 0GBG3ZX EXCISION OF LEFT THYROID GLAND LOBE, PERCUTANEOUS APPROACH, DIAGNOSTIC: ICD-10-PCS | Performed by: RADIOLOGY

## 2021-11-23 PROCEDURE — 85027 COMPLETE CBC AUTOMATED: CPT

## 2021-11-23 PROCEDURE — 74011000250 HC RX REV CODE- 250: Performed by: INTERNAL MEDICINE

## 2021-11-23 PROCEDURE — P9045 ALBUMIN (HUMAN), 5%, 250 ML: HCPCS

## 2021-11-23 PROCEDURE — 74011250636 HC RX REV CODE- 250/636: Performed by: HOSPITALIST

## 2021-11-23 PROCEDURE — 65620000000 HC RM CCU GENERAL

## 2021-11-23 RX ORDER — POTASSIUM CHLORIDE 14.9 MG/ML
10 INJECTION INTRAVENOUS
Status: COMPLETED | OUTPATIENT
Start: 2021-11-23 | End: 2021-11-23

## 2021-11-23 RX ORDER — MAGNESIUM SULFATE HEPTAHYDRATE 40 MG/ML
2 INJECTION, SOLUTION INTRAVENOUS ONCE
Status: COMPLETED | OUTPATIENT
Start: 2021-11-23 | End: 2021-11-23

## 2021-11-23 RX ORDER — ALBUMIN HUMAN 50 G/1000ML
SOLUTION INTRAVENOUS
Status: COMPLETED
Start: 2021-11-23 | End: 2021-11-23

## 2021-11-23 RX ORDER — ALBUMIN HUMAN 50 G/1000ML
25 SOLUTION INTRAVENOUS ONCE
Status: COMPLETED | OUTPATIENT
Start: 2021-11-23 | End: 2021-11-23

## 2021-11-23 RX ORDER — ALBUMIN HUMAN 50 G/1000ML
12.5 SOLUTION INTRAVENOUS ONCE
Status: COMPLETED | OUTPATIENT
Start: 2021-11-23 | End: 2021-11-23

## 2021-11-23 RX ADMIN — SODIUM CHLORIDE 1.5 G: 900 INJECTION INTRAVENOUS at 05:04

## 2021-11-23 RX ADMIN — ALBUMIN (HUMAN) 12.5 G: 12.5 INJECTION, SOLUTION INTRAVENOUS at 03:58

## 2021-11-23 RX ADMIN — FENTANYL CITRATE 75 MCG/HR: 50 INJECTION INTRAVENOUS at 17:54

## 2021-11-23 RX ADMIN — HEPARIN SODIUM 5000 UNITS: 5000 INJECTION INTRAVENOUS; SUBCUTANEOUS at 17:00

## 2021-11-23 RX ADMIN — ALBUMIN (HUMAN) 12.5 G: 12.5 INJECTION, SOLUTION INTRAVENOUS at 22:35

## 2021-11-23 RX ADMIN — CHLORHEXIDINE GLUCONATE 15 ML: 1.2 RINSE ORAL at 08:30

## 2021-11-23 RX ADMIN — Medication 10 ML: at 05:04

## 2021-11-23 RX ADMIN — POTASSIUM CHLORIDE 10 MEQ: 200 INJECTION, SOLUTION INTRAVENOUS at 10:00

## 2021-11-23 RX ADMIN — DEXTROSE MONOHYDRATE 50 ML/HR: 50 INJECTION, SOLUTION INTRAVENOUS at 16:28

## 2021-11-23 RX ADMIN — ALBUMIN (HUMAN) 12.5 G: 12.5 INJECTION, SOLUTION INTRAVENOUS at 02:30

## 2021-11-23 RX ADMIN — SODIUM CHLORIDE 1.5 G: 900 INJECTION INTRAVENOUS at 17:00

## 2021-11-23 RX ADMIN — ALBUMIN HUMAN 12.5 G: 50 SOLUTION INTRAVENOUS at 02:30

## 2021-11-23 RX ADMIN — NOREPINEPHRINE BITARTRATE 2 MCG/MIN: 1 SOLUTION INTRAVENOUS at 00:35

## 2021-11-23 RX ADMIN — MIDAZOLAM 4 MG/HR: 5 INJECTION INTRAMUSCULAR; INTRAVENOUS at 22:35

## 2021-11-23 RX ADMIN — POTASSIUM CHLORIDE 10 MEQ: 200 INJECTION, SOLUTION INTRAVENOUS at 09:00

## 2021-11-23 RX ADMIN — Medication 10 ML: at 16:19

## 2021-11-23 RX ADMIN — ALBUMIN (HUMAN) 25 G: 12.5 INJECTION, SOLUTION INTRAVENOUS at 08:41

## 2021-11-23 RX ADMIN — DEXTROSE MONOHYDRATE 50 ML/HR: 50 INJECTION, SOLUTION INTRAVENOUS at 00:32

## 2021-11-23 RX ADMIN — CHLORHEXIDINE GLUCONATE 15 ML: 1.2 RINSE ORAL at 21:16

## 2021-11-23 RX ADMIN — DEXTROSE MONOHYDRATE 25 G: 25 INJECTION, SOLUTION INTRAVENOUS at 06:48

## 2021-11-23 RX ADMIN — FAMOTIDINE 20 MG: 10 INJECTION INTRAVENOUS at 02:34

## 2021-11-23 RX ADMIN — MAGNESIUM SULFATE 2 G: 2 INJECTION INTRAVENOUS at 08:45

## 2021-11-23 RX ADMIN — SODIUM PHOSPHATE, MONOBASIC, MONOHYDRATE: 276; 142 INJECTION, SOLUTION INTRAVENOUS at 07:53

## 2021-11-23 RX ADMIN — ALBUMIN HUMAN 12.5 G: 50 SOLUTION INTRAVENOUS at 03:58

## 2021-11-23 RX ADMIN — HEPARIN SODIUM 5000 UNITS: 5000 INJECTION INTRAVENOUS; SUBCUTANEOUS at 04:00

## 2021-11-23 RX ADMIN — Medication 10 ML: at 21:16

## 2021-11-23 NOTE — PROGRESS NOTES
0730 Bedside and Verbal shift change report given to Yohan Barbosa RN (oncoming nurse) by Brain Robledo RN (offgoing nurse). Report included the following information SBAR, Intake/Output, MAR, Recent Results and Cardiac Rhythm NSR.     1115 Radiology tech provided RN with contact information for Dr. Norma Duval, on call radiologist, to facilitate communication between providers regarding FNA order. 36 Dr. Ximena Lopez ENT MD, provided contact information for Dr. Norma Duval.     1500 Radiology MD, NP and tech at the bedside. Currently obtaining FNA. 1930 Bedside and Verbal shift change report given to Brain Robledo RN (oncoming nurse) by Demario Jules RN (offgoing nurse). Report included the following information SBAR, Intake/Output, Recent Results and Cardiac Rhythm NSR.

## 2021-11-23 NOTE — PROGRESS NOTES
1930: SBAR received from [employer]. Drip rates verified. 2000: Patient assessed. Patient appears comfortable and sedated. Vital signs as expected on current drips (levo,versed,fent). 0200: UO decreasing and MAPs running mid 60s - spoke with MD will give fluid challenge with 250 of albumin. 0400: Patient UO increasing with albumin bolus x2 and MAP in 70s - will attempt to wean levophed. 0630: Spoke with MD for electrolyte replacement - sodium phos ordered. 0645: AM Lab shows Glucose of 61 - POC 70 - gave 1 amp of dextrose per order. 0700: Patient restless - increased versed. 0730: Bedside and Verbal shift change report given to [employer]  (oncoming nurse) by Rachael Bernheim  (offgoing nurse). Report included the following information SBAR, Kardex, Intake/Output, MAR and Recent Results.

## 2021-11-23 NOTE — PROGRESS NOTES
Physical Therapy - defer     Chart reviewed, patient received sedated and on vent. Per ABCDEF protocol, will work with patient when PEEP is 10.0 or less, FIO2 60% or less, and patient is following basic commands. Will follow patient peripherally.      Recommend nursing to complete with patient, as able and per protocol, in order to promote cardiopulmonary systems, maintain strength, endurance and independence:   -bed in chair position or maximize full reverse Trendelenburg position to facilitate upright activity with foot board and non-skid footwear on 3x/day ~30-60 mins each   -ROM during bathing B UEs and LEs  -positioning to prevent contractures and edema  RASS -1/0/+1 (during SAT) · Active ROM  · Sitting (bed in chair position)  · Standing (reverse Trendelenburg)  · ADLs   RASS -3/2 · Passive ROM  · Sit (bed in chair position)   RASS -5/-4 · Passive ROM         Thank you for your assistance.    \

## 2021-11-23 NOTE — PROGRESS NOTES
[facility]    ICU TEAM Progress Note    Name: Brigid Brandon   : 1935   MRN: [id number]   Date: 2021           ICU Assessment     1. Large lower R-midline paratracheal mass  2. Airway obstruction d/t neck mass  a. Tracheal deviation  b. ?Massive Goiter  c. ?Parathyroid Carcinoma  3. Primary hypercalcemia/primary hyperparathyroidism  4. VÍCTOR  5. Pneumonia - aspiration/post-obstructive    Rapid COVID negative    6.0 ETT placed fiberoptically  in OR  CT neck/chest reviewed  Spoke with Dr. Colin Mccarty ([facility] on ) - she plans to discuss case with Dr. Lang Gitelman for FNA of L thyroid nodule - order placed         ICU Comprehensive Plan of Care:     1. Neurological System  Spontaneous Awakening Trial: Yes  Sedation: Propofol, Versed and Fentanyl  Analgesia: Fentanyl    2. Cardiovascular System  SBP Goal of: > 90 mmHg  MAP Goal of: > 65 mmHg  Transfusion Trigger (Hgb): <7 g/dL  Keep K > 4; Mg > 2   Mateo for hypotension d/t sedation    3. Respiratory System  Head of bed > 30 degrees  Spontaneous Breathing Trial: No  SpO2 Goal: > 92%  DVT Prophylaxis: SCD's or Sequential Compression Device and Heparin   CPT, pulm toilet  Unasyn Day 2/x    4. Renal/GI/Endocrine System  IVFs: KVO  Ulcer Prophylaxis: Pepcid (famotidine)   Bowel Regimen: None needed at this time  Feeding:  Pending - place OGT, start TFs  Blood Sugar Goal 120-180 - Glycemic Control: Insulin    5. Infectious Disease  Indwelling Catheter:  Tubes: ETT  Lines: Peripheral IV and midline  Drains: None    6. PT/OT: pending     7. Goals of Care Discussion with family Yes     8. Plan of Care/Code Status: Full Code    9. Discussed Care Plan with Bedside RN    10.  Documentation of Current Medications    Subjective:   Progress Note: 2021      Reason for ICU Admission: Airway compromise     HPI:  (from HPI HM note as patient having some orientation issues)    This is an 43-year-old female who presented with fatigue, weakness, constipation and some transient confusion.  She was seen day before admission by her primary physician is Dr. Lorri Maria from [facility] had some blood drawn for routine and Dr. Lorri Maria called the daughter on day of admission and told her that the calcium was extremely high and she needed to come to the hospital. Alfreda Reyes has had no problems with chest pain, shortness of breath, nausea or vomiting and no other GI or  symptoms noted.  Patient is alert and oriented currently with no acute complaints.   Per pt, noticed lost weight last several years \" previous pants and clothes all too large for her now\" . Denied dysphagia. Denied voice changes.  No history of cancer.  .     Was called by  Team, Dr. Debra Jules, due to respiratory insufficiency and possible airway compromise. During patients MRI today on 11/20, patient had difficult time, lying flat, and MRI showed paratracheal mass with some extension into the chest cavity. ENT on board, would like a CT neck/chest with contrast, to determine more about the pathology - patient needs intubated for this study. Overnight Events:   11/20 -- Admitted to ICU  11/21 -- No major o/n events reported  11/22 -- Unable to place PICC due to vessel compression, on MV, RASS goal -4  11/23 -- MV, workup pending; RASS goal -4    Active Problem List:     Problem List  Date Reviewed: 11/14/2021          Codes Class    Thyroid mass ICD-10-CM: E07.9  ICD-9-CM: 246.9         Respiratory failure with hypoxia (HCC) ICD-10-CM: J96.91  ICD-9-CM: 518.81         * (Principal) Hypercalcemia ICD-10-CM: S74.34  ICD-9-CM: 275.42         Lung mass ICD-10-CM: R91.8  ICD-9-CM: 786.6               Past Medical History:      has no past medical history on file. Past Surgical History:      has no past surgical history on file. Home Medications:     Prior to Admission medications    Medication Sig Start Date End Date Taking?  Authorizing Provider   chlorthalidone (HYGROTON) 25 mg tablet Take 25 mg by mouth daily. Yes Provider, Historical   amLODIPine (Norvasc) 5 mg tablet Take 5 mg by mouth daily. Yes Provider, Historical   rosuvastatin (CRESTOR) 10 mg tablet Take 10 mg by mouth nightly. Yes Provider, Historical       Allergies/Social/Family History:     No Known Allergies   Social History     Tobacco Use    Smoking status: Not on file    Smokeless tobacco: Not on file   Substance Use Topics    Alcohol use: Not on file      No family history on file. Review of Systems:     Review of systems not obtained due to patient factors. Objective:   Vital Signs:  Visit Vitals  BP (!) 103/41   Pulse 71   Temp 97.5 °F (36.4 °C)   Resp 15   Ht 5' 6\" (1.676 m)   Wt 55.1 kg (121 lb 7.6 oz)   SpO2 99%   BMI 19.61 kg/m²    O2 Flow Rate (L/min): 15 l/min O2 Device: Ventilator, Endotracheal tube Temp (24hrs), Av.4 °F (36.3 °C), Min:96.2 °F (35.7 °C), Max:98.6 °F (37 °C)           Intake/Output:     Intake/Output Summary (Last 24 hours) at 2021 0751  Last data filed at 2021 0700  Gross per 24 hour   Intake 1965.41 ml   Output 485 ml   Net 1480.41 ml       Physical Exam:    General appearance: NAD, appears stated age  Head: Normocephalic, without obvious abnormality, atraumatic  Eyes: negative  Nose: Nares normal. Septum midline. Throat: ETT, OGT, Lips, mucosa, and tongue normal. Teeth and gums normal  Lungs: clear to auscultation bilaterally  Heart: regular rate and rhythm, S1, S2 normal, no murmur, click, rub or gallop  Abdomen: soft, non-tender.  Bowel sounds normal. No masses,  no organomegaly  Extremities: extremities normal, atraumatic, no cyanosis or edema  Pulses: 2+ and symmetric  Neurologic: Grossly normal    LABS AND  DATA: Personally reviewed  Recent Labs     21   WBC 11.1* 12.6*   HGB 7.6* 8.2*   HCT 25.1* 24.9*    181     Recent Labs     21   * 139   K 3.5 3.1*    110*   CO2 17* 20*   BUN 55* 55*   CREA 2.65* 2.49*   GLU 61* 81   CA 10.4* 10.9*   MG 1.6 1.8   PHOS 1.9* 2.3*     Recent Labs     11/22/21  0418 11/21/21  0609   AP 52 39*   TP 5.9* 5.8*   ALB 2.6* 2.9*   GLOB 3.3 2.9     Recent Labs     11/22/21  0418   INR 1.1   PTP 11.4*   APTT 42.1*      Recent Labs     11/23/21  0509 11/20/21  2247   PHI 7.38 7.40   PCO2I 33.4* 33.0*   PO2I 128* 101*   FIO2I 40 55     No results for input(s): CPK, CKMB, TROIQ, BNPP in the last 72 hours. Hemodynamics:   PAP:   CO:     Wedge:   CI:     CVP:    SVR:       PVR:       Ventilator Settings:  Mode Rate Tidal Volume Pressure FiO2 PEEP   Assist control, Volume control   450 ml    40 % (weaned to 30% per protocol) 5 cm H20     Peak airway pressure: 21 cm H2O    Minute ventilation: 6.8 l/min        MEDS: Reviewed    Chest X-Ray:  CXR Results  (Last 48 hours)    None          Multidisciplinary Rounds Completed: Yes    ABCDEF Bundle/Checklist Completed:  Yes    SPECIAL EQUIPMENT  None    DISPOSITION  Stay in ICU    CRITICAL CARE CONSULTANT NOTE  I had a face to face encounter with the patient, reviewed and interpreted patient data including clinical events, labs, images, vital signs, I/O's, and examined patient. I have discussed the case and the plan and management of the patient's care with the consulting services, the bedside nurses and the respiratory therapist.      NOTE OF PERSONAL INVOLVEMENT IN CARE   This patient has a high probability of imminent, clinically significant deterioration, which requires the highest level of preparedness to intervene urgently. I participated in the decision-making and personally managed or directed the management of the following life and organ supporting interventions that required my frequent assessment to treat or prevent imminent deterioration. I personally spent 60 minutes of critical care time. This is time spent at this critically ill patient's bedside actively involved in patient care as well as the coordination of care.   This does not include any procedural time which has been billed separately.     Rod Horton DO   Staff [facility] Critical Care  11/23/2021

## 2021-11-23 NOTE — PROGRESS NOTES
RENAL  PROGRESS NOTE        Subjective:   Less UO,reponded to IV alb  Objective:   VITALS SIGNS:    Visit Vitals  BP (!) 118/44 (BP 1 Location: Right upper arm, BP Patient Position: At rest)   Pulse 73   Temp 97.5 °F (36.4 °C)   Resp 15   Ht 5' 6\" (1.676 m)   Wt 55.1 kg (121 lb 7.6 oz)   SpO2 99%   BMI 19.61 kg/m²       O2 Device: Ventilator, Endotracheal tube   O2 Flow Rate (L/min): 15 l/min   Temp (24hrs), Av.6 °F (36.4 °C), Min:96.7 °F (35.9 °C), Max:98.6 °F (37 °C)         PHYSICAL EXAM:  On vent   Trace edema    DATA REVIEW:     INTAKE / OUTPUT:   Last shift:      No intake/output data recorded. Last 3 shifts:  190 -  0700  In: 2749.3 [I.V.:2749.3]  Out: 785 [Urine:785]    Intake/Output Summary (Last 24 hours) at 2021 0942  Last data filed at 2021 0700  Gross per 24 hour   Intake 1855.61 ml   Output 450 ml   Net 1405.61 ml         LABS:   Recent Labs     21  0417 21  0418 21  1831 21  1023 21  1023   WBC 11.1* 12.6*  --   --  13.8*   HGB 7.6* 8.2* 8.2*   < > 6.6*   HCT 25.1* 24.9* 25.1*   < > 20.2*    181  --   --  176    < > = values in this interval not displayed. Recent Labs     21  0417 21  0418 21  0609 21  1809 21  1809   * 139 138   < > 138   K 3.5 3.1* 3.6   < > 3.6    110* 110*   < > 110*   CO2 17* 20* 21   < > 21   GLU 61* 81 61*   < > 97   BUN 55* 55* 48*   < > 46*   CREA 2.65* 2.49* 2.42*   < > 2.23*   CA 10.4* 10.9* 11.3*   < > 11.9*   MG 1.6 1.8 1.8  --   --    PHOS 1.9* 2.3* 2.4*  --   --    ALB  --  2.6* 2.9*  --  2.4*   TBILI  --  0.4 0.5  --  0.4   ALT  --  25 23  --  26   INR  --  1.1  --   --   --     < > = values in this interval not displayed.           VÍCTOR,at least part of it 2nd hypercalcemia  hypophosphatemia  Severe hypercalcemia better;not receiving sensipar since no NGT    Ultra high PTH suspicion of Parathyroid carcinoma  Massive goiter -> closing airway  Acute resp failure-> intubated 11/20   Sepsis-> PNA  Anemia  Hypokalemia        Creatinine  holding (sepsis/hypotension).    Very high PTH (1720)-    suspicion of parathyroid carcinoma given how high her IPTH levels were. CT neck showed massive multinodular goiter/thyromegaly. ENT following    Replace Phos  Might be going to [facility]?  FOR NECK SURGERY   If calcium up again and we are unable to give sensipar PO   Will see if we can get Parsabiv IV  Discussed with RN

## 2021-11-23 NOTE — PROGRESS NOTES
Transitions of Care Plan     RUR: 14% - low   Admission Dx: hypercalcemia; RT paratracheal mass   Consults: ENT; Nephrology; SLP; Therapy   Baseline: ambulates with RW; resides alone; daughters nearby   Barriers to Discharge: medical   Disposition: Hospital Transfer to [facility] - on wait list    CM updated during IDR - patient will need transfer to [facility] for Thoracic surgery consult due to large right paratracheal mass. Ongoing carcinoma workup at [facility] until bed is available at [facility]. CM spoke with [address] [place] [address] Transfer Center - patient currently on wait list with [facility]; Kirk Arredondo MD is accepting physician at [facility] - could change pending on bed assignment. CM provided CCU unit number for transfer center to call once bed becomes available.     Jhony Seth, MPH  Care Manager [facility]  Available via [employer] or  [employer]

## 2021-11-23 NOTE — PROGRESS NOTES
Speech Therapy    Chart reviewed. Patient remains vented. Will follow for SLP as medically appropriate. Sharee Smith M.S., YOSHI-SLP

## 2021-11-23 NOTE — PROGRESS NOTES
Occupational Therapy:   11/23/21    Chart reviewed, patient received sedated and on vent. Per ABCDEF protocol, will work with patient when PEEP is 10.0 or less, FIO2 60% or less, and patient is following basic commands. Will follow patient peripherally. Recommend nursing to complete with patient, as able and per protocol, in order to promote cardiopulmonary systems, maintain strength, endurance and independence:   -bed in chair position or maximize full reverse Trendelenburg position to facilitate upright activity with foot board and non-skid footwear on 3x/day ~30-60 mins each   -ROM during bathing B UEs and LEs  -positioning to prevent contractures and edema  RASS -1/0/+1 (during SAT)  Active ROM   Sitting (bed in chair position)   Standing (reverse Trendelenburg)   ADLs   RASS -3/2  Passive ROM   Sit (bed in chair position)   RASS -5/-4  Passive ROM       Thank you for your assistance.    Ren Brewer, JENISE, OTR/L

## 2021-11-24 VITALS
HEART RATE: 74 BPM | WEIGHT: 130.07 LBS | OXYGEN SATURATION: 99 % | HEIGHT: 66 IN | BODY MASS INDEX: 20.9 KG/M2 | TEMPERATURE: 98.8 F | RESPIRATION RATE: 15 BRPM | DIASTOLIC BLOOD PRESSURE: 55 MMHG | SYSTOLIC BLOOD PRESSURE: 141 MMHG

## 2021-11-24 LAB
ALBUMIN SERPL-MCNC: 2.8 G/DL (ref 3.5–5)
ALBUMIN/GLOB SERPL: 0.9 {RATIO} (ref 1.1–2.2)
ALP SERPL-CCNC: 55 U/L (ref 45–117)
ALT SERPL-CCNC: 16 U/L (ref 12–78)
ANION GAP SERPL CALC-SCNC: 11 MMOL/L (ref 5–15)
AST SERPL-CCNC: 13 U/L (ref 15–37)
BILIRUB DIRECT SERPL-MCNC: 0.3 MG/DL (ref 0–0.2)
BILIRUB SERPL-MCNC: 0.6 MG/DL (ref 0.2–1)
BUN SERPL-MCNC: 52 MG/DL (ref 6–20)
BUN/CREAT SERPL: 19 (ref 12–20)
CALCIT SERPL-MCNC: 4.1 PG/ML (ref 0–5)
CALCIUM SERPL-MCNC: 9.5 MG/DL (ref 8.5–10.1)
CHLORIDE SERPL-SCNC: 107 MMOL/L (ref 97–108)
CO2 SERPL-SCNC: 17 MMOL/L (ref 21–32)
CREAT SERPL-MCNC: 2.74 MG/DL (ref 0.55–1.02)
ERYTHROCYTE [DISTWIDTH] IN BLOOD BY AUTOMATED COUNT: 13.4 % (ref 11.5–14.5)
GLOBULIN SER CALC-MCNC: 3.1 G/DL (ref 2–4)
GLUCOSE SERPL-MCNC: 75 MG/DL (ref 65–100)
HCT VFR BLD AUTO: 20.2 % (ref 35–47)
HCT VFR BLD AUTO: 20.6 % (ref 35–47)
HGB BLD-MCNC: 6.5 G/DL (ref 11.5–16)
HGB BLD-MCNC: 6.8 G/DL (ref 11.5–16)
HISTORY CHECKED?,CKHIST: NORMAL
HISTORY CHECKED?,CKHIST: NORMAL
MAGNESIUM SERPL-MCNC: 2.2 MG/DL (ref 1.6–2.4)
MCH RBC QN AUTO: 28.6 PG (ref 26–34)
MCHC RBC AUTO-ENTMCNC: 32.2 G/DL (ref 30–36.5)
MCV RBC AUTO: 89 FL (ref 80–99)
NRBC # BLD: 0 K/UL (ref 0–0.01)
NRBC BLD-RTO: 0 PER 100 WBC
PHOSPHATE SERPL-MCNC: 3.1 MG/DL (ref 2.6–4.7)
PLATELET # BLD AUTO: 145 K/UL (ref 150–400)
PMV BLD AUTO: 11.6 FL (ref 8.9–12.9)
POTASSIUM SERPL-SCNC: 3 MMOL/L (ref 3.5–5.1)
PROT SERPL-MCNC: 5.9 G/DL (ref 6.4–8.2)
RBC # BLD AUTO: 2.27 M/UL (ref 3.8–5.2)
SODIUM SERPL-SCNC: 135 MMOL/L (ref 136–145)
WBC # BLD AUTO: 6.8 K/UL (ref 3.6–11)

## 2021-11-24 PROCEDURE — P9016 RBC LEUKOCYTES REDUCED: HCPCS

## 2021-11-24 PROCEDURE — 74011000258 HC RX REV CODE- 258: Performed by: INTERNAL MEDICINE

## 2021-11-24 PROCEDURE — 74011250636 HC RX REV CODE- 250/636: Performed by: INTERNAL MEDICINE

## 2021-11-24 PROCEDURE — 74011250636 HC RX REV CODE- 250/636

## 2021-11-24 PROCEDURE — 86901 BLOOD TYPING SEROLOGIC RH(D): CPT

## 2021-11-24 PROCEDURE — 74011250636 HC RX REV CODE- 250/636: Performed by: ANESTHESIOLOGY

## 2021-11-24 PROCEDURE — 36430 TRANSFUSION BLD/BLD COMPNT: CPT

## 2021-11-24 PROCEDURE — 74011250636 HC RX REV CODE- 250/636: Performed by: HOSPITALIST

## 2021-11-24 PROCEDURE — 86923 COMPATIBILITY TEST ELECTRIC: CPT

## 2021-11-24 PROCEDURE — P9045 ALBUMIN (HUMAN), 5%, 250 ML: HCPCS

## 2021-11-24 PROCEDURE — 85018 HEMOGLOBIN: CPT

## 2021-11-24 PROCEDURE — 74011000250 HC RX REV CODE- 250: Performed by: INTERNAL MEDICINE

## 2021-11-24 PROCEDURE — 94003 VENT MGMT INPAT SUBQ DAY: CPT

## 2021-11-24 RX ORDER — ALBUMIN HUMAN 50 G/1000ML
12.5 SOLUTION INTRAVENOUS ONCE
Status: COMPLETED | OUTPATIENT
Start: 2021-11-24 | End: 2021-11-24

## 2021-11-24 RX ORDER — SODIUM CHLORIDE 9 MG/ML
250 INJECTION, SOLUTION INTRAVENOUS AS NEEDED
Status: DISCONTINUED | OUTPATIENT
Start: 2021-11-24 | End: 2021-11-25 | Stop reason: HOSPADM

## 2021-11-24 RX ORDER — POTASSIUM CHLORIDE 14.9 MG/ML
10 INJECTION INTRAVENOUS
Status: COMPLETED | OUTPATIENT
Start: 2021-11-24 | End: 2021-11-24

## 2021-11-24 RX ORDER — ALBUMIN HUMAN 50 G/1000ML
SOLUTION INTRAVENOUS
Status: COMPLETED
Start: 2021-11-24 | End: 2021-11-24

## 2021-11-24 RX ORDER — MIDAZOLAM HYDROCHLORIDE 1 MG/ML
4 INJECTION, SOLUTION INTRAMUSCULAR; INTRAVENOUS ONCE
Status: COMPLETED | OUTPATIENT
Start: 2021-11-24 | End: 2021-11-24

## 2021-11-24 RX ORDER — HYDROMORPHONE HYDROCHLORIDE 1 MG/ML
1 INJECTION, SOLUTION INTRAMUSCULAR; INTRAVENOUS; SUBCUTANEOUS ONCE
Status: COMPLETED | OUTPATIENT
Start: 2021-11-24 | End: 2021-11-24

## 2021-11-24 RX ADMIN — FENTANYL CITRATE 75 MCG/HR: 50 INJECTION INTRAVENOUS at 13:42

## 2021-11-24 RX ADMIN — ALBUMIN HUMAN 12.5 G: 50 SOLUTION INTRAVENOUS at 00:21

## 2021-11-24 RX ADMIN — POTASSIUM CHLORIDE 10 MEQ: 14.9 INJECTION, SOLUTION INTRAVENOUS at 02:54

## 2021-11-24 RX ADMIN — SODIUM CHLORIDE 1.5 G: 900 INJECTION INTRAVENOUS at 17:04

## 2021-11-24 RX ADMIN — SODIUM CHLORIDE 1.5 G: 900 INJECTION INTRAVENOUS at 05:14

## 2021-11-24 RX ADMIN — HYDROMORPHONE HYDROCHLORIDE 1 MG: 1 INJECTION, SOLUTION INTRAMUSCULAR; INTRAVENOUS; SUBCUTANEOUS at 21:05

## 2021-11-24 RX ADMIN — MIDAZOLAM HYDROCHLORIDE 4 MG: 1 INJECTION, SOLUTION INTRAMUSCULAR; INTRAVENOUS at 21:05

## 2021-11-24 RX ADMIN — ALBUMIN (HUMAN) 12.5 G: 12.5 INJECTION, SOLUTION INTRAVENOUS at 00:21

## 2021-11-24 RX ADMIN — POTASSIUM CHLORIDE 10 MEQ: 14.9 INJECTION, SOLUTION INTRAVENOUS at 01:38

## 2021-11-24 RX ADMIN — HEPARIN SODIUM 5000 UNITS: 5000 INJECTION INTRAVENOUS; SUBCUTANEOUS at 16:30

## 2021-11-24 RX ADMIN — CHLORHEXIDINE GLUCONATE 15 ML: 1.2 RINSE ORAL at 08:49

## 2021-11-24 RX ADMIN — FAMOTIDINE 20 MG: 10 INJECTION INTRAVENOUS at 02:54

## 2021-11-24 RX ADMIN — NOREPINEPHRINE BITARTRATE 4 MCG/MIN: 1 SOLUTION INTRAVENOUS at 00:59

## 2021-11-24 RX ADMIN — Medication 10 ML: at 13:43

## 2021-11-24 RX ADMIN — DEXTROSE MONOHYDRATE 50 ML/HR: 50 INJECTION, SOLUTION INTRAVENOUS at 01:00

## 2021-11-24 RX ADMIN — HEPARIN SODIUM 5000 UNITS: 5000 INJECTION INTRAVENOUS; SUBCUTANEOUS at 03:19

## 2021-11-24 RX ADMIN — Medication 10 ML: at 05:14

## 2021-11-24 NOTE — PROGRESS NOTES
1930: SBAR received from [address] [place] [address]. Drip rates verified. 2000: Patient assessed. Hypothermic - matt hugger placed. CHG bath provided. 2230: Patient's UO minimal - MAPs trending down. Spoke with MD - 250ml Albumin ordered. UO with improvement. 2345: Patient with increased amounts of sinus arrhythmia - orders to draw AM labs now. 0015: Patient requiring increased doses of levophed - 250ml albumin bolus ordered. 0100: Reviewed lab results - hbg 6.5 - I unit prbcs ordered. Potassium 3.0 -  20 meqs IV replacement ordered. Type and screen ordered and sent. Additional PIV placed. 0515: Levophed stopped. 0530: PRBC transfusion complete. 0730: Bedside and Verbal shift change report given to [address] [place] [address]  (oncoming nurse) by Aspen Jack  (offgoing nurse). Report included the following information SBAR, Kardex, Intake/Output, MAR and Recent Results.

## 2021-11-24 NOTE — PROGRESS NOTES
Heated vent circuit initiated at this time. Patient tolerated well. Will continue to monitor patient.

## 2021-11-24 NOTE — PROGRESS NOTES
0730: Bedside shift change report given to Misti Coffey RN (oncoming nurse) by Salomon Slade RN (offgoing nurse). Report included the following information SBAR, Kardex, ED Summary, OR Summary, Procedure Summary, Intake/Output, MAR, Recent Results, Cardiac Rhythm NSR and Dual Neuro Assessment. 1030: Hgb 6.8.     1730: 1 PRBC started. 1930: Bedside shift change report given to Hortencia Aragon RN (oncoming nurse) by Misti Coffey RN (offgoing nurse). Report included the following information SBAR, Kardex, ED Summary, OR Summary, Procedure Summary, Intake/Output, MAR, Recent Results, Cardiac Rhythm NSR and Dual Neuro Assessment. 2000: TRANSFER - OUT REPORT:    Verbal report given [facility]  [facility] for urgent transfer       Report consisted of patients Situation, Background, Assessment and   Recommendations(SBAR). Information from the following report(s) SBAR, Kardex, ED Summary, OR Summary, Procedure Summary, Intake/Output, MAR, Recent Results, Cardiac Rhythm NSR and Dual Neuro Assessment was reviewed with the receiving nurse.     Lines:   Peripheral IV 11/20/21 Left Antecubital (Active)   Site Assessment Clean, dry, & intact 11/24/21 1200   Phlebitis Assessment 0 11/24/21 1200   Infiltration Assessment 0 11/24/21 1200   Dressing Status Clean, dry, & intact 11/24/21 1200   Dressing Type Transparent; Tape 11/24/21 1200   Hub Color/Line Status Pink 11/24/21 1200   Action Taken Open ports on tubing capped 11/24/21 1200   Alcohol Cap Used Yes 11/24/21 1200       Peripheral IV 11/23/21 Right Wrist (Active)   Site Assessment Clean, dry, & intact 11/24/21 1200   Phlebitis Assessment 0 11/24/21 1200   Infiltration Assessment 0 11/24/21 1200   Dressing Status Clean, dry, & intact 11/24/21 1200   Dressing Type Transparent; Tape 11/24/21 1200   Hub Color/Line Status Pink; Flushed 11/24/21 1200   Action Taken Open ports on tubing capped 11/24/21 1200   Alcohol Cap Used Yes 11/24/21 1200 Peripheral IV 11/24/21 Right; Lower Forearm (Active)   Site Assessment Clean, dry, & intact 11/24/21 1200   Phlebitis Assessment 0 11/24/21 1200   Infiltration Assessment 0 11/24/21 1200   Dressing Status Clean, dry, & intact 11/24/21 1200   Dressing Type Transparent 11/24/21 1200   Hub Color/Line Status Pink; Flushed 11/24/21 1200   Action Taken Open ports on tubing capped 11/24/21 1200   Alcohol Cap Used Yes 11/24/21 1200        Opportunity for questions and clarification was provided.       Patient transported with:   Monitor  O2 @ [facility] liters  Patient's medications from home  Patient-specific medications from Pharmacy  Registered Nurse

## 2021-11-24 NOTE — PROGRESS NOTES
Comprehensive Nutrition Assessment    Type and Reason for Visit: Reassess    Nutrition Recommendations/Plan:      Supplement with 100 mg B1 due to refeeding risk    Nutrition Support-EN vs PN    Tube feeding goal: Osmolite 1.2 @ 50 ml/hr with 70 ml water flush q 4 h    PPN goal: 4.25% AA D10 @ 63 ml/hr with 100 mg B1 and 500 ml 20% lipid 3 x/week    Stop IVF once starting nutrition support    Nutrition Assessment:    Pt admitted with hypercalcemia. No PMHx on file. Severe hypercalcemia 2/2 Primary Hyperparathyroidism. Nephrology following. 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 Intubated 11/20 d/t mass causing airway obstruction. Possible transfer to [facility] for surgery (ENT/Thoracic). Patient has been NPO x 1 week; inadequate nutrient intake prior to intubation. Discussed feeding plan during MDR. May need to start PPN if unable to place OGT. At potential risk for refeeding-recommend supplementing with B1. Potassium replaced today. Phosphorus WNL today after receiving repetition yesterday. Weight gain of 10.4 kg in the past week; weeping edema of LUE and RLE. Positive fluid balance of ~14L. Sodium slightly BNL. Recommended tube feeding if OGT placed: Osmolite 1.2 @ 50 ml/hr with 70 ml water flush q 4 hr. This will provide 1100 ml, 1320 calories, 61 gm protein and 1320 ml free water (tube feeding/flush) per day. Goal PPN if unable to feed enterally: 4.25% AA D10 @ 63 ml/hr with 100 mg B1 and 500 ml 20% lipid 3 x/week. This will provide 8596-5319 ml, 64 gm protein and 1195 average daily calories. IVF ordered and provide 1200 ml and 200 calories-recommend discontinuing with start of nutrition support. Malnutrition Assessment:  Malnutrition Status:   Moderate malnutrition    Context:  Acute illness     Findings of the 6 clinical characteristics of malnutrition:   Energy Intake:  1 - 75% or less of est energy req for 7 or more days  Weight Loss:  Unable to assess     Body Fat Loss:  1 - Mild body fat loss, Buccal region, Triceps, Orbital   Muscle Mass Loss:  No significant muscle mass loss, Clavicles (pectoralis & deltoids), Temples (temporalis)  Fluid Accumulation:  No significant fluid accumulation,     Strength:  Not performed     Nutritionally Significant Medications:   Pepcid, D5, KCL, Fentanyl    Estimated Daily Nutrient Needs:  Energy (kcal): 0822-3056 (25-28 kcal/kg); Weight Used for Energy Requirements:  (49 kg (standing scale 11/15))  Protein (g): 60 (1.2g/kg);  Weight Used for Protein Requirements: Current  Fluid (ml/day): ~1300; Method Used for Fluid Requirements: 1 ml/kcal    Nutrition Related Findings:       BM: 11/22  Edema: 3+ weeping LUE, RLE  Wounds:  None       Current Nutrition Therapies:   Diet: None  Additional Caloric Sources: D5 @ 50 ml/hr    Anthropometric Measures:  · Height:  5' 6\" (167.6 cm)  · Current Body Wt:  59 kg (130 lb 1.1 oz)   · Ideal Body Wt:  130:  100.1 %    · BMI Categories:  Underweight (BMI less than 22) age over 72     Wt Readings from Last 10 Encounters:   11/24/21 59 kg (130 lb 1.1 oz)       Nutrition Diagnosis:   · Underweight related to inadequate protein-energy intake as evidenced by  (poor intake)    · Inadequate oral intake related to impaired respiratory function as evidenced by intubation, NPO or clear liquid status due to medical condition      Nutrition Interventions:   Food and/or Nutrient Delivery: Start parenteral nutrition, Start tube feeding  Nutrition Education and Counseling: No recommendations at this time  Coordination of Nutrition Care: Continue to monitor while inpatient, Interdisciplinary rounds    Goals:  Initiate nutrition support in next 24 hr.       Nutrition Monitoring and Evaluation:   Behavioral-Environmental Outcomes: None identified  Food/Nutrient Intake Outcomes: Enteral nutrition intake/tolerance, Parenteral nutrition intake/tolerance  Physical Signs/Symptoms Outcomes: Biochemical data, Constipation, Fluid status or edema, Weight    Discharge Planning:     Too soon to determine     Jennifer Abarca RD CNSC  Contact: [employer]

## 2021-11-24 NOTE — PROGRESS NOTES
SLP Contact Note    Noted pt remains intubated. SLP will continue to follow.       Thank you,  LINH MelchorEd, [id number] [facility]  Speech-Language Pathologist

## 2021-11-24 NOTE — PROGRESS NOTES
ICU TEAM Progress Note    Name: Sloan Vilchis   : 1935   MRN: [id number]   Date: 2021      Assessment:   Reason for ICU Admission: airway compromise    Brief HPI: 76-year-old female who presented with fatigue, weakness, constipation and some transient confusion.  She was seen day before admission by her primary physician, Dr. Flaca Cavazos from [facility].  Notedto be hypercalcemic. She has had no problems with chest pain, shortness of breath, nausea or vomiting and no other GI or  symptoms noted.  Patient is alert and oriented currently with no acute complaints. Per pt, noticed lost weight last several years \" previous pants and clothes all too large for her now\" . Denied dysphagia. Denied voice changes.  No history of cancer. During patients MRI on , patient had difficult time, lying flat, and MRI showed paratracheal mass with some extension into the chest cavity. ENT consulted and imaging obtained following intubation in OR.    - Large lower R-midline paratracheal mass  - Airway obstruction d/t neck mass  - Tracheal deviation  - Massive Goiter  - Parathyroid Carcinoma  - Primary hypercalcemia/primary hyperparathyroidism  - VÍCTOR  - Pneumonia - aspiration/post-obstructive    POD:  4 Days Post-Op    S/P:   Procedure(s):  Awake fiberoptic intubation    Plan:     Neuro: Keep sedated on versed/fentanyl. No SATs given tenuous airway. Pulm: Airway compromise 2/2 cervical/mediastinal mass. Intubated on mechanical ventilation. Continue current settings. Hold on SBT. Chlorhexidine oral care. HOB >30 degrees. High risk for decompensation/death if EET removed. Cardiac: MAP goal >65. No requirement for pressors at this point. Volume status challenging - reliance on preload less significant now that patient intubated and on minimal peep. Will aim for net even I:Os. Renal: Nephrology following for VÍCTOR, hypercalcemia. Concern for parathyroid carcinoma given PTH elevation. Volume up over last day.  G[place]ell for I:Os. GI: Patient has been NPO for some time. Two prior attempts at NGT placement unsuccessful. Will attempt OGT today. Patient will require supplemental nutrition if this is unsuccessful. PICC placement aborted 11/23 due to vessel compression from mediastinal mass and inability to thread guidewire. IJ placement would be challenging if not contraindicated given bilateral neck masses, Subclavian line would have identical issues with wire threading as with previous PICC attempts. Femoral line may be best option. Will likely start PPN in interim. Pepcid for SUP ppx. ID: [email or web address] for presumed pna (7 day course)  Heme/Onc: anemia - acute on chronic. Received PRBC overnight. Transfusion goal <7g/dl. SQH for DVT ppx. FNA path pending. Patient accepted for transfer to [facility] for thoracic/onc/ENT - waiting on bed availability. Endo: Glycemic control excellent  MSK: Not appropriate for PT/OT. [id number] [address] for PROM. SCDs. F - Feeding:  Pending   A - Analgesia: Fentanyl  S - Sedation: Versed  T - DVT Prophylaxis: SCD's or Sequential Compression Device and Heparin   C - Code Status: Full Code  H - Head of Bed: > 30 Degrees  U - Ulcer Prophylaxis: Pepcid (famotidine)   G - Glycemic Control: Insulin  S - Spontaneous Breathing Trial: No  B - Bowel Regimen: MiraLax  I - Indwelling Catheter:   Tubes: ETT  Lines: Peripheral IV  Drains: Borrero Catheter  D - De-escalation of Antibiotics: Unasyn    Subjective:   Overnight Events:   11/24 -- Uneventful night. Path pending. sCr slightly worse.    11/23 -- MV, workup pending; RASS goal -4  11/22 -- Unable to place PICC due to vessel compression, on MV, RASS goal -4  11/21 -- No major o/n events reported  11/20 -- Admitted to ICU following intubation    Objective:     Visit Vitals  BP (!) 110/49   Pulse 63   Temp 98.2 °F (36.8 °C)   Resp 15   Ht 5' 6\" (1.676 m)   Wt 59 kg (130 lb 1.1 oz)   SpO2 100%   BMI 20.99 kg/m²    O2 Flow Rate (L/min): 15 l/min O2 Device: Endotracheal tube, Ventilator Temp (24hrs), Av.3 °F (36.8 °C), Min:96.6 °F (35.9 °C), Max:99 °F (37.2 °C)    Ventilator Settings:  Mode Rate Tidal Volume Pressure FiO2 PEEP   Assist control, Volume control   450 ml    30 % 5 cm H20     Peak airway pressure: 26 cm H2O    Minute ventilation: 6.62 l/min      Intake/Output:     Intake/Output Summary (Last 24 hours) at 2021 1042  Last data filed at 2021 1000  Gross per 24 hour   Intake 2616.6 ml   Output 633 ml   Net 1983.6 ml     Physical Exam:  General appearance: NAD, appears stated age  Head: Normocephalic, without obvious abnormality, atraumatic  Eyes: negative  Nose: Nares normal. Septum midline. Throat: ETT, Lips, mucosa, and tongue normal. Teeth and gums normal. Bilateral firm palpable masses in zone 1 extending up to zone 2. Lungs: coarse sounds in b/l bases. Ventilator sounds. Heart: regular rate and rhythm,  Abdomen: soft, non-tender. Bowel sounds normal. No masses,  no organomegaly  Extremities: extremities normal, atraumatic, no cyanosis or edema  Pulses: 2+ and symmetric  Neurologic: Grossly normal    24h Labs & Data: Reviewed    Medications: Reviewed    Chest X-Ray : Endotracheal tube is now in good position. Otherwise, no change since recent CT, see that report. CT Chest :  1. 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 4.7 cm superior left thyroid lobe nodule hypoenhancing is and should be biopsied if thyroidectomy is not planned. 2. Bilateral pneumonia, likely aspiration. 3. Low endotracheal tube. Consider retraction 2-3 cm. 4. Small left and trace right pleural effusions. TTE : LVEF is >70%. Normal cavity size and wall thickness. Hyperdynamic systolic function. Wall motion: normal. Left ventricular diastolic dysfunction. AV: Moderate aortic valve sclerosis with mild aortic stenosis. Aortic valve mean gradient is 16 mmHg. MV: Mitral valve non-specific thickening.  Moderate mitral annular calcification. Mild to moderate mitral valve regurgitation is present. LA: Moderately dilated left atrium. Multidisciplinary Rounds Completed:  No    ABCDEF Bundle/Checklist Completed: Yes    SPECIAL EQUIPMENT: None    DISPOSITION: Stay in ICU    CRITICAL CARE CONSULTANT NOTE  I had a face to face encounter with the patient, reviewed and interpreted patient data including clinical events, labs, images, vital signs, I/O's, and examined patient. I have discussed the case and the plan and management of the patient's care with the consulting services, the bedside nurses and the respiratory therapist.      NOTE OF PERSONAL INVOLVEMENT IN CARE   This patient has a high probability of imminent, clinically significant deterioration, which requires the highest level of preparedness to intervene urgently. I participated in the decision-making and personally managed or directed the management of the following life and organ supporting interventions that required my frequent assessment to treat or prevent imminent deterioration. I personally spent 50 minutes of critical care time. This is time spent at this critically ill patient's bedside actively involved in patient care as well as the coordination of care and discussions with the patient's family. This does not include any procedural time which has been billed separately.     Morenita Lopez MD  Staff Intensivist/Anesthesiologist  [facility] [place] [facility]  11/24/2021

## 2021-11-24 NOTE — PROGRESS NOTES
Physical Therapy - defer  Patient remains intubated, Hgb 6.5, not appropriate for therapy. Will continue to hold therapy and follow peripherally.

## 2021-11-24 NOTE — DISCHARGE SUMMARY
Discharge Summary     Patient: Trey Lockett       MRN: [id number]       YOB: 1935       Age: 81 y.o. Date of admission:  11/13/2021    Date of discharge:  11/24/2021    Primary care provider:  Gerardo Ledesma MD     Admitting provider:  Yamileth Rousseau MD    Discharging provider(s): Morenita Lopez MD - Staff Intensivist     Consultations  · IP CONSULT TO NEPHROLOGY  · IP CONSULT TO OTOLARYNGOLOGY  · IP CONSULT TO OTOLARYNGOLOGY    Procedures  · FNA Neck Mass 11/23    Discharge destination: [facility]    Admission diagnosis  · Hypercalcemia [E83.52]  · Lung mass [R91.8]    Please refer to the admission history and physical for details on the presenting problem. Final discharge diagnoses and brief hospital course    - Large lower R-midline paratracheal mass  - Airway obstruction d/t neck mass  - Tracheal deviation  - Massive Goiter  - Parathyroid Carcinoma  - Primary hypercalcemia/primary hyperparathyroidism  - VÍCTOR  - Pneumonia - aspiration/post-obstructive    72-year-old female who presented with fatigue, weakness, constipation and some transient confusion.  She was seen day before admission by her primary physician, Dr. Lita Lo from [facility].  Notedto be hypercalcemic. She has had no problems with chest pain, shortness of breath, nausea or vomiting and no other GI or  symptoms noted. Per pt, noticed lost weight last several months-years \" previous pants and clothes all too large for her now\" . Denied dysphagia. Denied voice changes.  No history of cancer. During patient's MRI on 11/20, patient had difficult time, lying flat, and MRI showed paratracheal mass with some extension into the chest cavity.  ENT consulted and imaging obtained following intubation in OR. Awake fiberoptic intubation via combination video laryngoscopy and FOB.      Hypercalcemia noted on admission responded well to cinacalcet. Acute on chronic anemia s/p 2u PRBCs. She remains stable in the ICU on minimal vent settings, sedated on versed and fentanyl. Daughter Loulou Walker ([phone])  Granddaughter Alana Boykin ([phone])      Physical examination at discharge  Visit Vitals  BP (!) 122/50   Pulse 73   Temp 99 °F (37.2 °C)   Resp 15   Ht 5' 6\" (1.676 m)   Wt 59 kg (130 lb 1.1 oz)   SpO2 100%   BMI 20.99 kg/m²     General appearance: NAD, appears stated age. Intubated and sedated  Head: Normocephalic, without obvious abnormality, atraumatic  Eyes: negative  Nose: Nares normal. Septum midline. Throat: ETT, Lips, mucosa, and tongue normal. Teeth and gums normal. Bilateral firm palpable masses in zone 1 extending up to zone 2. Lungs: coarse sounds in b/l bases. Ventilator sounds predominate. Heart: regular rate and rhythm,  Abdomen: soft, non-tender. Bowel sounds normal. No masses,  no organomegaly  Extremities: extremities normal, atraumatic, no cyanosis or edema  Pulses: 2+ and symmetric  Neurologic: Grossly normal     Recent Labs     11/24/21  1043 11/23/21 2359 11/23/21 0417 11/22/21 0418 11/22/21 0418   WBC  --  6.8 11.1*  --  12.6*   HGB 6.8* 6.5* 7.6*   < > 8.2*   HCT 20.6* 20.2* 25.1*   < > 24.9*   PLT  --  145* 173  --  181    < > = values in this interval not displayed. Recent Labs     11/23/21 2359 11/23/21 0417 11/22/21 0418   * 133* 139   K 3.0* 3.5 3.1*    108 110*   CO2 17* 17* 20*   BUN 52* 55* 55*   CREA 2.74* 2.65* 2.49*   GLU 75 61* 81   CA 9.5 10.4* 10.9*   MG 2.2 1.6 1.8   PHOS 3.1 1.9* 2.3*     Recent Labs     11/23/21 2359 11/22/21 0418   AP 55 52   TP 5.9* 5.9*   ALB 2.8* 2.6*   GLOB 3.1 3.3     Recent Labs     11/22/21 0418   INR 1.1   PTP 11.4*   APTT 42.1*      Pertinent imaging studies:  Chest X-Ray 11/21: Endotracheal tube is now in good position. Otherwise, no change since recent CT, see that report.     CT Chest 11/21:  1.  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 4.7 cm superior left thyroid lobe nodule hypoenhancing is and should be biopsied if thyroidectomy is not planned. 2. Bilateral pneumonia, likely aspiration. 3. Low endotracheal tube. Consider retraction 2-3 cm. 4. Small left and trace right pleural effusions.     TTE 11/16: LVEF is >70%. Normal cavity size and wall thickness. Hyperdynamic systolic function. Wall motion: normal. Left ventricular diastolic dysfunction. AV: Moderate aortic valve sclerosis with mild aortic stenosis. Aortic valve mean gradient is 16 mmHg. MV: Mitral valve non-specific thickening. Moderate mitral annular calcification. Mild to moderate mitral valve regurgitation is present. LA: Moderately dilated left atrium. ---------------------------------    Chronic Diagnoses:    Problem List as of 11/24/2021 Date Reviewed: 11/14/2021          Codes Class Noted - Resolved    Thyroid mass ICD-10-CM: E07.9  ICD-9-CM: 246.9  11/20/2021 - Present        Respiratory failure with hypoxia [facility]) ICD-10-CM: J96.91  ICD-9-CM: 518.81  11/20/2021 - Present        * (Principal) Hypercalcemia ICD-10-CM: F14.67  ICD-9-CM: 275.42  11/13/2021 - Present        Lung mass ICD-10-CM: R91.8  ICD-9-CM: 786.6  11/13/2021 - Present              Time spent on discharge related activities today greater than 30 minutes. Signed:    Jennie Durham MD   Staff Intensivist   11/24/2021   6:04 PM    Cc:  Regina Anguiano MD

## 2021-11-24 NOTE — PROGRESS NOTES
Spiritual Care Assessment/Progress Note  [facility]      NAME: Sundar Amaral      MRN: [id number]  AGE: 81 y.o. SEX: female  Latter-day Affiliation:    Language: English     11/24/2021     Total Time (in minutes): 22     Spiritual Assessment begun in [facility] CORON[facility] C[facility] through conversation with:         []Patient        [x] Family    [] Friend(s)        Reason for Consult: Initial/Spiritual assessment, critical care     Spiritual beliefs: (Please include comment if needed)     [x] Identifies with a fuad tradition: Latter-day         [] Supported by a fuad community:            [] Claims no spiritual orientation:           [] Seeking spiritual identity:                [] Adheres to an individual form of spirituality:           [] Not able to assess:                           Identified resources for coping:      [x] Prayer                               [] Music                  [] Guided Imagery     [x] Family/friends                 [] Pet visits     [] Devotional reading                         [] Unknown     [] Other:                                               Interventions offered during this visit: (See comments for more details)          Family/Friend(s):  Affirmation of emotions/emotional suffering, Affirmation of fuad, Coping skills reviewed/reinforced, Initial Assessment     Plan of Care:     [x] Support spiritual and/or cultural needs    [] Support AMD and/or advance care planning process      [] Support grieving process   [] Coordinate Rites and/or Rituals    [] Coordination with community clergy   [] No spiritual needs identified at this time   [] Detailed Plan of Care below (See Comments)  [] Make referral to Music Therapy  [] Make referral to Pet Therapy     [] Make referral to Addiction services  [] Make referral to [facility]  [] Make referral to Spiritual Care Partner  [] No future visits requested        [x] Contact Spiritual Care for further referrals     Comments: Spiritual assessment via phone with patients daughter Shiv Wade. Ms Jay shared her concerns about the plan of care for her mother. She also shared that her Sandrea Knife from [address] has been visited with patient and is supporting her and the family. At this time Roshan had no spiritual needs, but verbally expressed her appreciation for the call. Provided empathic listening and cultivated a relationship of trust, compassion and support. Visited by: Augustus Huntley.  Chaparro Nicholson, [address] [place] [address] paging Service [phone])

## 2021-11-24 NOTE — PROGRESS NOTES
Occupational Therapy:     Chart reviewed, airway compromised. Communicated with RN, no plans for sedation vacation. Will not be medically appropriate for OT, will sign off. Please re-consult when medically appropriate.      Thank you,   JENISE Najera, OTR/L

## 2021-11-24 NOTE — PROGRESS NOTES
RENAL  PROGRESS NOTE        Subjective:   On vent   Objective:   VITALS SIGNS:    Visit Vitals  BP (!) 110/49   Pulse 63   Temp 98.2 °F (36.8 °C)   Resp 15   Ht 5' 6\" (1.676 m)   Wt 59 kg (130 lb 1.1 oz)   SpO2 100%   BMI 20.99 kg/m²       O2 Device: Endotracheal tube, Ventilator   O2 Flow Rate (L/min): 15 l/min   Temp (24hrs), Av.3 °F (36.8 °C), Min:96.6 °F (35.9 °C), Max:99 °F (37.2 °C)         PHYSICAL EXAM:  On vent   Trace edema    DATA REVIEW:     INTAKE / OUTPUT:   Last shift:       07 - 1900  In: 55.5 [I.V.:55.5]  Out: 15 [Urine:15]  Last 3 shifts: 1901 -  0700  In: 4700.6 [I.V.:4394.3]  Out: 918 [Urine:918]    Intake/Output Summary (Last 24 hours) at 2021 1022  Last data filed at 2021 0800  Gross per 24 hour   Intake 2505.6 ml   Output 558 ml   Net 1947.6 ml         LABS:   Recent Labs     21   WBC 6.8 11.1* 12.6*   HGB 6.5* 7.6* 8.2*   HCT 20.2* 25.1* 24.9*   * 173 181     Recent Labs     21   * 133* 139   K 3.0* 3.5 3.1*    108 110*   CO2 17* 17* 20*   GLU 75 61* 81   BUN 52* 55* 55*   CREA 2.74* 2.65* 2.49*   CA 9.5 10.4* 10.9*   MG 2.2 1.6 1.8   PHOS 3.1 1.9* 2.3*   ALB 2.8*  --  2.6*   TBILI 0.6  --  0.4   ALT 16  --  25   INR  --   --  1.1          VÍCTOR,at least part of it 2nd hypercalcemia;ATN ;non oliguric  hypophosphatemia  Severe hypercalcemia better;not receiving sensipar since no NGT    Ultra high PTH suspicion of Parathyroid carcinoma ? ? Massive goiter -> closing airway  Acute resp failure-> intubated    Sepsis-> PNA  Anemia  Hypokalemia        Creatinine slightly up  Very high PTH (1720)-         Replace Phos  Replace K  Calcium better  Might be going to [facility]?  FOR NECK SURGERY   If calcium up again and we are unable to give sensipar PO   Will see if we can get Parsabiv IV  Noticed extensive note by ENT

## 2021-11-25 LAB
ABO + RH BLD: NORMAL
BLD PROD TYP BPU: NORMAL
BLD PROD TYP BPU: NORMAL
BLOOD GROUP ANTIBODIES SERPL: NORMAL
BPU ID: NORMAL
BPU ID: NORMAL
CROSSMATCH RESULT,%XM: NORMAL
CROSSMATCH RESULT,%XM: NORMAL
SPECIMEN EXP DATE BLD: NORMAL
STATUS OF UNIT,%ST: NORMAL
STATUS OF UNIT,%ST: NORMAL
UNIT DIVISION, %UDIV: 0
UNIT DIVISION, %UDIV: 0

## 2022-03-18 PROBLEM — J96.91 RESPIRATORY FAILURE WITH HYPOXIA (HCC): Status: ACTIVE | Noted: 2021-11-20

## 2022-03-18 PROBLEM — E83.52 HYPERCALCEMIA: Status: ACTIVE | Noted: 2021-11-13

## 2022-03-19 PROBLEM — E07.9 THYROID MASS: Status: ACTIVE | Noted: 2021-11-20

## 2022-03-20 PROBLEM — R91.8 LUNG MASS: Status: ACTIVE | Noted: 2021-11-13

## 2023-04-15 NOTE — PROGRESS NOTES
[address] [facility]  Hospitalist Group                                                                                          Hospitalist Progress Note  Larry Nolasco MD  Answering service: [phone] or [phone] from in house phone        Date of Service:  2021  NAME:  Rubens Abdi  :  1935  MRN:  [id number]      Admission Summary:   Rubens Abdi is a 81 y.o. female who presents with fatigue constipation and abnormal lab test      This is an 81-year-old female who presents with fatigue, weakness, constipation and some transient confusion.  She was seen yesterday by her primary physician is Dr. Kati Thomason from [facility] had some blood drawn for routine and Dr. Kati Thomason called the daughter today and told her that the calcium was extremely high and she needed to come to the hospital. Logan Liu has had no problems with chest pain, shortness of breath, nausea or vomiting and no other GI or  symptoms noted.  Patient is alert and oriented currently with no acute complaints. Per pt, noticed lost weight last several years \" previous pants and clothes all too large for her now\" . Denied dysphagia. Denied voice changes.  No history of cancer.  .       Interval history / Subjective:     F/u hypercalcemia   Feels her weakness to be improving  Creatinine slightly better  Calcium slight improvement  Assessment & Plan:     Severe hypercalcemia: etiology unclear  VÍCTOR  Primary hyperparathyroidism  - pt is taking thiazide diuretics at home, currently on hold  -Calcium improved slightly  -Creatinine improved slightly  -Increased intact PTH, follow PTHrP  -renal US unremarkable  -Continue IV Fluids  -Continue Sensipar  -Appreciate Nephrology    Right paratracheal mass  -Appreciate ENT, sestamibi scan, CT neck with contrast once renal function improves    Hypokalemia: replace as needed  Hyponatremia: monitor  Hypomagnesemia: replace as needed  Abnormal admission EKG.  Echo still awaited  HT[facility] thiazide [facility]       Renal diet    PT/OT SNF    Code status: FULL CODE  DVT prophylaxis: Heparin  Spoke to Dori [phone] at patient's request    Plan: On sensipar, continue IV fluids. Waiting for Echo (ordered 11/14)    Care Plan discussed with: Patient/Family  Anticipated Disposition: TBD  Anticipated Discharge: Greater than 48 hours     Hospital Problems  Date Reviewed: 11/14/2021          Codes Class Noted POA    * (Principal) Hypercalcemia ICD-10-CM: E87.19  ICD-9-CM: 275.42  11/13/2021 Yes        Lung mass ICD-10-CM: R91.8  ICD-9-CM: 786.6  11/13/2021 Unknown                Review of Systems:   A comprehensive review of systems was negative except for that written in the HPI. Vital Signs:    Last 24hrs VS reviewed since prior progress note. Most recent are:  Visit Vitals  BP (!) 167/56 (BP 1 Location: Left upper arm, BP Patient Position: At rest)   Pulse 62   Temp 98.6 °F (37 °C)   Resp 16   Ht 5' 6\" (1.676 m)   Wt 48.6 kg (107 lb 3.2 oz)   SpO2 100%   BMI 17.30 kg/m²         Intake/Output Summary (Last 24 hours) at 11/16/2021 0852  Last data filed at 11/16/2021 0759  Gross per 24 hour   Intake    Output 1225 ml   Net -1225 ml        Physical Examination:     I had a face to face encounter with this patient and independently examined them on 11/16/2021 as outlined below:          Constitutional:  No acute distress   ENT:  Oral mucosa moist, oropharynx benign. Resp:  CTA bilaterally. No wheezing/rhonchi/rales. No accessory muscle use   CV:  Regular rhythm, normal rate, no murmurs, gallops, rubs    GI:  Soft, non distended, non tender. normoactive bowel sounds, no hepatosplenomegaly     Musculoskeletal:  No edema, warm, 2+ pulses throughout    Neurologic:  Moves all extremities.   AAOx3, CN II-XII reviewed            Data Review:    Review and/or order of clinical lab test      Labs:     Recent Labs     11/16/21  0416 11/14/21  0414   WBC 7.0 7.5   HGB 10.9* 10.1*   HCT 34.1* 31.5*    167 Recent Labs     11/16/21 0416 11/15/21  0340 11/14/21  0414   * 135* 134*   K 3.8 3.6 3.3*    105 105   CO2 24 25 25   BUN 42* 52* 56*   CREA 1.85* 1.95* 1.88*   * 81 88   CA 14.2* 14.7* 14.8*   MG 2.9* 1.4* 1.7   PHOS 2.5* 2.5* 2.8     Recent Labs     11/16/21 0416 11/13/21  1340   ALT 18 20   AP 57 58   TBILI 0.3 0.6   TP 7.4 8.2   ALB 3.1* 3.6   GLOB 4.3* 4.6*     No results for input(s): INR, PTP, APTT, INREXT, INREXT in the last 72 hours. No results for input(s): FE, TIBC, PSAT, FERR in the last 72 hours. No results found for: FOL, RBCF   No results for input(s): PH, PCO2, PO2 in the last 72 hours. No results for input(s): CPK, CKNDX, TROIQ in the last 72 hours.     No lab exists for component: CPKMB  No results found for: CHOL, CHOLX, CHLST, CHOLV, HDL, HDLP, LDL, LDLC, DLDLP, TGLX, TRIGL, TRIGP, CHHD, CHHDX  No results found for: GLUCPOC  No results found for: COLOR, APPRN, SPGRU, REFSG, KATY, PROTU, GLUCU, KETU, BILU, UROU, IRASEMA, LEUKU, GLUKE, EPSU, BACTU, WBCU, RBCU, CASTS, UCRY      Medications Reviewed:     Current Facility-Administered Medications   Medication Dose Route Frequency    cinacalcet (SENSIPAR) tablet 30 mg  30 mg Oral BID WITH MEALS    rosuvastatin (CRESTOR) tablet 10 mg  10 mg Oral QHS    sodium chloride (NS) flush 5-40 mL  5-40 mL IntraVENous Q8H    sodium chloride (NS) flush 5-40 mL  5-40 mL IntraVENous PRN    acetaminophen (TYLENOL) tablet 650 mg  650 mg Oral Q6H PRN    Or    acetaminophen (TYLENOL) suppository 650 mg  650 mg Rectal Q6H PRN    polyethylene glycol (MIRALAX) packet 17 g  17 g Oral DAILY PRN    ondansetron (ZOFRAN ODT) tablet 4 mg  4 mg Oral Q8H PRN    Or    ondansetron (ZOFRAN) injection 4 mg  4 mg IntraVENous Q6H PRN    amLODIPine (NORVASC) tablet 5 mg  5 mg Oral DAILY    heparin (porcine) injection 5,000 Units  5,000 Units SubCUTAneous Q12H    0.9% sodium chloride infusion  125 mL/hr IntraVENous CONTINUOUS ______________________________________________________________________  EXPECTED LENGTH OF STAY: 2d 14h  ACTUAL LENGTH OF [place]AY:          [place] [id number] diagnostic evaluation with medical services

## 2023-05-11 RX ORDER — CHLORTHALIDONE 25 MG/1
25 TABLET ORAL DAILY
COMMUNITY

## 2023-05-11 RX ORDER — AMLODIPINE BESYLATE 5 MG/1
5 TABLET ORAL DAILY
COMMUNITY

## 2023-05-11 RX ORDER — ROSUVASTATIN CALCIUM 10 MG/1
10 TABLET, COATED ORAL NIGHTLY
COMMUNITY